# Patient Record
Sex: FEMALE | Race: BLACK OR AFRICAN AMERICAN | Employment: UNEMPLOYED | URBAN - METROPOLITAN AREA
[De-identification: names, ages, dates, MRNs, and addresses within clinical notes are randomized per-mention and may not be internally consistent; named-entity substitution may affect disease eponyms.]

---

## 2017-02-21 RX ORDER — CARVEDILOL 12.5 MG/1
TABLET ORAL
Qty: 60 TABLET | Refills: 5 | Status: SHIPPED | OUTPATIENT
Start: 2017-02-21 | End: 2017-08-28 | Stop reason: SDUPTHER

## 2017-02-21 RX ORDER — HYDROCHLOROTHIAZIDE 25 MG/1
TABLET ORAL
Qty: 30 TABLET | Refills: 5 | Status: SHIPPED | OUTPATIENT
Start: 2017-02-21 | End: 2017-12-22 | Stop reason: SDUPTHER

## 2017-02-21 RX ORDER — FUROSEMIDE 40 MG/1
TABLET ORAL
Qty: 30 TABLET | Refills: 5 | Status: SHIPPED | OUTPATIENT
Start: 2017-02-21 | End: 2017-08-21 | Stop reason: SDUPTHER

## 2017-03-28 RX ORDER — TRAMADOL HYDROCHLORIDE 50 MG/1
TABLET ORAL
Qty: 120 TABLET | Refills: 0 | Status: SHIPPED | OUTPATIENT
Start: 2017-03-28 | End: 2017-05-02 | Stop reason: SDUPTHER

## 2017-05-02 RX ORDER — TRAMADOL HYDROCHLORIDE 50 MG/1
TABLET ORAL
Qty: 120 TABLET | Refills: 0 | Status: SHIPPED | OUTPATIENT
Start: 2017-05-02 | End: 2017-06-02 | Stop reason: SDUPTHER

## 2017-05-02 RX ORDER — TRAMADOL HYDROCHLORIDE 50 MG/1
TABLET ORAL
Qty: 120 TABLET | Refills: 0 | Status: CANCELLED | OUTPATIENT
Start: 2017-05-02

## 2017-06-02 RX ORDER — TRAMADOL HYDROCHLORIDE 50 MG/1
TABLET ORAL
Qty: 120 TABLET | Refills: 1 | Status: SHIPPED | OUTPATIENT
Start: 2017-06-02 | End: 2017-07-26 | Stop reason: SDUPTHER

## 2017-06-16 ENCOUNTER — HOSPITAL ENCOUNTER (OUTPATIENT)
Age: 42
Discharge: HOME | End: 2017-06-16
Payer: MEDICARE

## 2017-06-16 ENCOUNTER — OFFICE VISIT (OUTPATIENT)
Dept: FAMILY MEDICINE CLINIC | Age: 42
End: 2017-06-16
Payer: MEDICARE

## 2017-06-16 ENCOUNTER — HOSPITAL ENCOUNTER (OUTPATIENT)
Age: 42
Setting detail: SPECIMEN
Discharge: HOME | End: 2017-06-16
Payer: MEDICARE

## 2017-06-16 ENCOUNTER — HOSPITAL ENCOUNTER (OUTPATIENT)
Dept: GENERAL RADIOLOGY | Age: 42
Discharge: HOME | End: 2017-06-16
Payer: MEDICARE

## 2017-06-16 VITALS
BODY MASS INDEX: 33.87 KG/M2 | HEART RATE: 71 BPM | SYSTOLIC BLOOD PRESSURE: 142 MMHG | OXYGEN SATURATION: 98 % | HEIGHT: 61 IN | DIASTOLIC BLOOD PRESSURE: 78 MMHG | WEIGHT: 179.4 LBS

## 2017-06-16 DIAGNOSIS — R53.83 OTHER FATIGUE: ICD-10-CM

## 2017-06-16 DIAGNOSIS — Z12.4 SCREENING FOR CERVICAL CANCER: ICD-10-CM

## 2017-06-16 DIAGNOSIS — M25.551 RIGHT HIP PAIN: Primary | ICD-10-CM

## 2017-06-16 DIAGNOSIS — Z13.220 SCREENING FOR LIPID DISORDERS: ICD-10-CM

## 2017-06-16 DIAGNOSIS — M25.551 PAIN OF RIGHT HIP JOINT: ICD-10-CM

## 2017-06-16 DIAGNOSIS — Z51.81 MEDICATION MONITORING ENCOUNTER: ICD-10-CM

## 2017-06-16 DIAGNOSIS — Z01.419 WELL WOMAN EXAM: Primary | ICD-10-CM

## 2017-06-16 DIAGNOSIS — Z12.31 ENCOUNTER FOR SCREENING MAMMOGRAM FOR MALIGNANT NEOPLASM OF BREAST: ICD-10-CM

## 2017-06-16 DIAGNOSIS — Z12.39 SCREENING FOR BREAST CANCER: ICD-10-CM

## 2017-06-16 DIAGNOSIS — M25.551 RIGHT HIP PAIN: ICD-10-CM

## 2017-06-16 LAB
ALBUMIN SERPL-MCNC: 4.1 G/DL (ref 3.5–5.2)
ALBUMIN/GLOBULIN RATIO: ABNORMAL (ref 1–2.5)
ALP BLD-CCNC: 41 U/L (ref 35–104)
ALT SERPL-CCNC: 18 U/L (ref 5–33)
ANION GAP SERPL CALCULATED.3IONS-SCNC: 11 MMOL/L (ref 9–17)
AST SERPL-CCNC: 17 U/L
BILIRUB SERPL-MCNC: 0.27 MG/DL (ref 0.3–1.2)
BUN BLDV-MCNC: 10 MG/DL (ref 6–20)
BUN/CREAT BLD: 14 (ref 9–20)
CALCIUM SERPL-MCNC: 9.5 MG/DL (ref 8.6–10.4)
CHLORIDE BLD-SCNC: 103 MMOL/L (ref 98–107)
CHOLESTEROL, FASTING: 142 MG/DL
CHOLESTEROL/HDL RATIO: 2.6
CO2: 28 MMOL/L (ref 20–31)
CREAT SERPL-MCNC: 0.72 MG/DL (ref 0.5–0.9)
GFR AFRICAN AMERICAN: >60 ML/MIN
GFR NON-AFRICAN AMERICAN: >60 ML/MIN
GFR SERPL CREATININE-BSD FRML MDRD: ABNORMAL ML/MIN/{1.73_M2}
GFR SERPL CREATININE-BSD FRML MDRD: ABNORMAL ML/MIN/{1.73_M2}
GLUCOSE FASTING: 83 MG/DL (ref 70–99)
HDLC SERPL-MCNC: 55 MG/DL
LDL CHOLESTEROL: 77 MG/DL (ref 0–130)
POTASSIUM SERPL-SCNC: 4.2 MMOL/L (ref 3.7–5.3)
SODIUM BLD-SCNC: 142 MMOL/L (ref 135–144)
TOTAL PROTEIN: 6.9 G/DL (ref 6.4–8.3)
TRIGLYCERIDE, FASTING: 50 MG/DL
TSH SERPL DL<=0.05 MIU/L-ACNC: 0.94 MIU/L (ref 0.3–5)
VLDLC SERPL CALC-MCNC: NORMAL MG/DL (ref 1–30)

## 2017-06-16 PROCEDURE — G0101 CA SCREEN;PELVIC/BREAST EXAM: HCPCS | Performed by: FAMILY MEDICINE

## 2017-06-16 PROCEDURE — 73502 X-RAY EXAM HIP UNI 2-3 VIEWS: CPT

## 2017-06-16 PROCEDURE — 80053 COMPREHEN METABOLIC PANEL: CPT

## 2017-06-16 PROCEDURE — 84443 ASSAY THYROID STIM HORMONE: CPT

## 2017-06-16 PROCEDURE — 80061 LIPID PANEL: CPT

## 2017-06-16 PROCEDURE — 36415 COLL VENOUS BLD VENIPUNCTURE: CPT

## 2017-06-16 RX ORDER — CYCLOBENZAPRINE HCL 10 MG
10 TABLET ORAL EVERY EVENING
Qty: 10 TABLET | Refills: 0 | Status: SHIPPED | OUTPATIENT
Start: 2017-06-16 | End: 2017-06-26

## 2017-06-16 RX ORDER — PREDNISONE 50 MG/1
50 TABLET ORAL EVERY MORNING
Qty: 7 TABLET | Refills: 0 | Status: SHIPPED | OUTPATIENT
Start: 2017-06-16 | End: 2017-06-23

## 2017-06-16 ASSESSMENT — PATIENT HEALTH QUESTIONNAIRE - PHQ9
SUM OF ALL RESPONSES TO PHQ9 QUESTIONS 1 & 2: 0
2. FEELING DOWN, DEPRESSED OR HOPELESS: 0
SUM OF ALL RESPONSES TO PHQ QUESTIONS 1-9: 0
1. LITTLE INTEREST OR PLEASURE IN DOING THINGS: 0

## 2017-06-16 ASSESSMENT — ENCOUNTER SYMPTOMS
ABDOMINAL DISTENTION: 0
BLOOD IN STOOL: 0
EYE ITCHING: 0
ABDOMINAL PAIN: 0
SHORTNESS OF BREATH: 0
EYE DISCHARGE: 0
RHINORRHEA: 0
NAUSEA: 0
FACIAL SWELLING: 0
CHEST TIGHTNESS: 0
WHEEZING: 0
CONSTIPATION: 0
VOICE CHANGE: 0
SINUS PRESSURE: 0
PHOTOPHOBIA: 0
COLOR CHANGE: 0
DIARRHEA: 0
VOMITING: 0
BACK PAIN: 0
COUGH: 0
SORE THROAT: 0
EYE PAIN: 0
EYE REDNESS: 0

## 2017-06-19 RX ORDER — HYDROCODONE BITARTRATE AND ACETAMINOPHEN 10; 325 MG/1; MG/1
.5-1 TABLET ORAL EVERY 4 HOURS PRN
Qty: 40 TABLET | Refills: 0 | Status: SHIPPED | OUTPATIENT
Start: 2017-06-19 | End: 2017-07-21 | Stop reason: SDUPTHER

## 2017-06-21 ENCOUNTER — TELEPHONE (OUTPATIENT)
Dept: FAMILY MEDICINE CLINIC | Age: 42
End: 2017-06-21

## 2017-06-23 LAB — CYTOLOGY REPORT: NORMAL

## 2017-06-26 RX ORDER — LISINOPRIL 40 MG/1
TABLET ORAL
Qty: 90 TABLET | Refills: 1 | Status: SHIPPED | OUTPATIENT
Start: 2017-06-26 | End: 2017-12-21 | Stop reason: SDUPTHER

## 2017-07-06 ENCOUNTER — OFFICE VISIT (OUTPATIENT)
Dept: FAMILY MEDICINE CLINIC | Age: 42
End: 2017-07-06
Payer: MEDICARE

## 2017-07-06 VITALS
BODY MASS INDEX: 33.57 KG/M2 | HEART RATE: 78 BPM | SYSTOLIC BLOOD PRESSURE: 114 MMHG | OXYGEN SATURATION: 98 % | WEIGHT: 177.8 LBS | HEIGHT: 61 IN | DIASTOLIC BLOOD PRESSURE: 76 MMHG

## 2017-07-06 DIAGNOSIS — Z11.3 SCREEN FOR STD (SEXUALLY TRANSMITTED DISEASE): ICD-10-CM

## 2017-07-06 DIAGNOSIS — R10.2 PELVIC PAIN IN FEMALE: Primary | ICD-10-CM

## 2017-07-06 PROCEDURE — 1036F TOBACCO NON-USER: CPT | Performed by: FAMILY MEDICINE

## 2017-07-06 PROCEDURE — G8427 DOCREV CUR MEDS BY ELIG CLIN: HCPCS | Performed by: FAMILY MEDICINE

## 2017-07-06 PROCEDURE — 99213 OFFICE O/P EST LOW 20 MIN: CPT | Performed by: FAMILY MEDICINE

## 2017-07-06 PROCEDURE — G8417 CALC BMI ABV UP PARAM F/U: HCPCS | Performed by: FAMILY MEDICINE

## 2017-07-06 ASSESSMENT — ENCOUNTER SYMPTOMS
CHEST TIGHTNESS: 0
BACK PAIN: 0
EYE DISCHARGE: 0
EYE PAIN: 0
FACIAL SWELLING: 0
BLOOD IN STOOL: 0
PHOTOPHOBIA: 0
EYE REDNESS: 0
ABDOMINAL PAIN: 0
COLOR CHANGE: 0
WHEEZING: 0
CONSTIPATION: 0
VOICE CHANGE: 0
DIARRHEA: 0
SORE THROAT: 0
SHORTNESS OF BREATH: 0
SINUS PRESSURE: 0
EYE ITCHING: 0
NAUSEA: 0
VOMITING: 0
ABDOMINAL DISTENTION: 0
RHINORRHEA: 0
COUGH: 0

## 2017-07-21 ENCOUNTER — TELEPHONE (OUTPATIENT)
Dept: FAMILY MEDICINE CLINIC | Age: 42
End: 2017-07-21

## 2017-07-21 ENCOUNTER — HOSPITAL ENCOUNTER (OUTPATIENT)
Dept: ULTRASOUND IMAGING | Age: 42
Discharge: HOME | End: 2017-07-21
Payer: MEDICARE

## 2017-07-21 DIAGNOSIS — R10.2 PELVIC PAIN IN FEMALE: ICD-10-CM

## 2017-07-21 PROCEDURE — 76856 US EXAM PELVIC COMPLETE: CPT

## 2017-07-21 RX ORDER — HYDROCODONE BITARTRATE AND ACETAMINOPHEN 10; 325 MG/1; MG/1
.5-1 TABLET ORAL EVERY 4 HOURS PRN
Qty: 40 TABLET | Refills: 0 | Status: SHIPPED | OUTPATIENT
Start: 2017-07-21 | End: 2017-08-31 | Stop reason: SDUPTHER

## 2017-07-26 RX ORDER — TRAMADOL HYDROCHLORIDE 50 MG/1
TABLET ORAL
Qty: 120 TABLET | Refills: 1 | Status: SHIPPED | OUTPATIENT
Start: 2017-07-26 | End: 2017-09-25 | Stop reason: SDUPTHER

## 2017-07-28 ENCOUNTER — HOSPITAL ENCOUNTER (OUTPATIENT)
Dept: MAMMOGRAPHY | Age: 42
Discharge: HOME | End: 2017-07-28
Payer: MEDICARE

## 2017-07-28 DIAGNOSIS — Z12.39 SCREENING FOR BREAST CANCER: ICD-10-CM

## 2017-07-28 DIAGNOSIS — Z12.31 ENCOUNTER FOR SCREENING MAMMOGRAM FOR MALIGNANT NEOPLASM OF BREAST: ICD-10-CM

## 2017-07-28 DIAGNOSIS — Z01.419 WELL WOMAN EXAM: ICD-10-CM

## 2017-07-28 PROCEDURE — 77063 BREAST TOMOSYNTHESIS BI: CPT

## 2017-08-22 RX ORDER — FUROSEMIDE 40 MG/1
TABLET ORAL
Qty: 30 TABLET | Refills: 3 | Status: SHIPPED | OUTPATIENT
Start: 2017-08-22 | End: 2017-12-22 | Stop reason: SDUPTHER

## 2017-08-28 RX ORDER — CARVEDILOL 12.5 MG/1
TABLET ORAL
Qty: 60 TABLET | Refills: 5 | Status: SHIPPED | OUTPATIENT
Start: 2017-08-28 | End: 2019-04-18 | Stop reason: SDUPTHER

## 2017-08-31 RX ORDER — HYDROCODONE BITARTRATE AND ACETAMINOPHEN 10; 325 MG/1; MG/1
.5-1 TABLET ORAL EVERY 4 HOURS PRN
Qty: 40 TABLET | Refills: 0 | Status: SHIPPED | OUTPATIENT
Start: 2017-08-31 | End: 2017-09-28 | Stop reason: SDUPTHER

## 2017-09-25 RX ORDER — TRAMADOL HYDROCHLORIDE 50 MG/1
TABLET ORAL
Qty: 120 TABLET | Refills: 1 | Status: SHIPPED | OUTPATIENT
Start: 2017-09-25 | End: 2017-11-17 | Stop reason: SDUPTHER

## 2017-09-29 RX ORDER — HYDROCODONE BITARTRATE AND ACETAMINOPHEN 10; 325 MG/1; MG/1
.5-1 TABLET ORAL EVERY 4 HOURS PRN
Qty: 40 TABLET | Refills: 0 | Status: SHIPPED | OUTPATIENT
Start: 2017-09-29 | End: 2017-10-06

## 2017-10-26 RX ORDER — HYDROCODONE BITARTRATE AND ACETAMINOPHEN 10; 325 MG/1; MG/1
.5-1 TABLET ORAL EVERY 4 HOURS PRN
Qty: 40 TABLET | Refills: 0 | Status: SHIPPED | OUTPATIENT
Start: 2017-10-26 | End: 2017-11-02

## 2017-11-17 RX ORDER — TRAMADOL HYDROCHLORIDE 50 MG/1
TABLET ORAL
Qty: 120 TABLET | Refills: 1 | Status: SHIPPED | OUTPATIENT
Start: 2017-11-17 | End: 2018-01-06 | Stop reason: SDUPTHER

## 2017-11-27 RX ORDER — HYDROCODONE BITARTRATE AND ACETAMINOPHEN 10; 325 MG/1; MG/1
.5-1 TABLET ORAL EVERY 4 HOURS PRN
Qty: 40 TABLET | Refills: 0 | Status: SHIPPED | OUTPATIENT
Start: 2017-11-27 | End: 2017-12-21 | Stop reason: SDUPTHER

## 2017-12-08 DIAGNOSIS — L84 CALLUS OF FOOT: Primary | ICD-10-CM

## 2017-12-21 ENCOUNTER — PATIENT MESSAGE (OUTPATIENT)
Dept: FAMILY MEDICINE CLINIC | Age: 42
End: 2017-12-21

## 2017-12-21 RX ORDER — HYDROCODONE BITARTRATE AND ACETAMINOPHEN 10; 325 MG/1; MG/1
.5-1 TABLET ORAL EVERY 4 HOURS PRN
Qty: 40 TABLET | Refills: 0 | Status: SHIPPED | OUTPATIENT
Start: 2017-12-21 | End: 2018-01-23 | Stop reason: SDUPTHER

## 2017-12-21 RX ORDER — LISINOPRIL 40 MG/1
TABLET ORAL
Qty: 90 TABLET | Refills: 3 | Status: SHIPPED | OUTPATIENT
Start: 2017-12-21 | End: 2019-02-25 | Stop reason: SDUPTHER

## 2017-12-21 NOTE — TELEPHONE ENCOUNTER
Next Visit Date:  No future appointments.     Health Maintenance   Topic Date Due    HIV screen  05/21/1988    DTaP/Tdap/Td vaccine (1 - Tdap) 05/21/1992    Flu vaccine (1) 09/01/2017    Cervical cancer screen  06/16/2018    Lipid screen  06/16/2022       No results found for: LABA1C          ( goal A1C is < 7)   No results found for: LABMICR  LDL Cholesterol (mg/dL)   Date Value   06/16/2017 77       (goal LDL is <100)   AST (U/L)   Date Value   06/16/2017 17     ALT (U/L)   Date Value   06/16/2017 18     BUN (mg/dL)   Date Value   06/16/2017 10     BP Readings from Last 3 Encounters:   07/06/17 114/76   06/16/17 (!) 142/78   10/24/16 122/90          (goal 120/80)    All Future Testing planned in [facility]  Lab Frequency Next Occurrence   T. pallidum Ab Once 01/02/2018   HIV Screen Once 07/06/2017   Hepatitis C Antibody Once 07/06/2017   Hepatitis B Surface Antigen Once 01/02/2018   Chlamydia/GC DNA, Urine Once 07/06/2017   HSV Non-Specific Antibody, IgM Once 07/06/2017               Patient Active Problem List:     Seasonal allergies     HTN (hypertension)     Iron (Fe) deficiency anemia     Obesity     GERD (gastroesophageal reflux disease)     Bell's palsy     Post-nasal drip     Nasal obstruction     Nasal congestion     Raynaud's phenomenon without gangrene

## 2017-12-22 RX ORDER — FUROSEMIDE 40 MG/1
TABLET ORAL
Qty: 30 TABLET | Refills: 11 | Status: SHIPPED | OUTPATIENT
Start: 2017-12-22 | End: 2019-02-15 | Stop reason: SDUPTHER

## 2017-12-22 RX ORDER — HYDROCHLOROTHIAZIDE 25 MG/1
25 TABLET ORAL DAILY
Qty: 90 TABLET | Refills: 3 | Status: SHIPPED | OUTPATIENT
Start: 2017-12-22 | End: 2018-06-15

## 2017-12-22 NOTE — TELEPHONE ENCOUNTER
From: Africa Dry  Sent: 12/21/2017 3:07 PM EST  Subject: Medication Renewal Request    Lara Torres would like a refill of the following medications:  hydrochlorothiazide (HYDRODIURIL) 25 MG tablet Albert Malcolm MD]    Preferred pharmacy: [facility] [address] [place] [address], [address] [place] [address] [phone] - F [phone]    Comment:

## 2018-01-06 DIAGNOSIS — M25.559 ARTHRALGIA OF HIP, UNSPECIFIED LATERALITY: Primary | ICD-10-CM

## 2018-01-08 RX ORDER — TRAMADOL HYDROCHLORIDE 50 MG/1
TABLET ORAL
Qty: 120 TABLET | Refills: 1 | Status: SHIPPED | OUTPATIENT
Start: 2018-01-08 | End: 2018-02-27 | Stop reason: SDUPTHER

## 2018-01-10 ENCOUNTER — OFFICE VISIT (OUTPATIENT)
Dept: PODIATRY | Age: 43
End: 2018-01-10
Payer: MEDICARE

## 2018-01-10 VITALS
HEIGHT: 61 IN | BODY MASS INDEX: 31.15 KG/M2 | SYSTOLIC BLOOD PRESSURE: 157 MMHG | HEART RATE: 76 BPM | WEIGHT: 165 LBS | DIASTOLIC BLOOD PRESSURE: 103 MMHG

## 2018-01-10 DIAGNOSIS — M76.62 TENDONITIS, ACHILLES, LEFT: Primary | ICD-10-CM

## 2018-01-10 DIAGNOSIS — D23.72 BENIGN NEOPLASM OF SKIN OF FOOT, LEFT: ICD-10-CM

## 2018-01-10 DIAGNOSIS — R26.2 WALKING DIFFICULTY DUE TO ANKLE AND FOOT: ICD-10-CM

## 2018-01-10 DIAGNOSIS — D23.71 BENIGN NEOPLASM OF SKIN OF RIGHT FOOT: ICD-10-CM

## 2018-01-10 DIAGNOSIS — M25.572 PAIN IN JOINT INVOLVING ANKLE AND FOOT, LEFT: ICD-10-CM

## 2018-01-10 PROCEDURE — 17110 DESTRUCTION B9 LES UP TO 14: CPT | Performed by: PODIATRIST

## 2018-01-10 PROCEDURE — G8427 DOCREV CUR MEDS BY ELIG CLIN: HCPCS | Performed by: PODIATRIST

## 2018-01-10 PROCEDURE — 99203 OFFICE O/P NEW LOW 30 MIN: CPT | Performed by: PODIATRIST

## 2018-01-10 PROCEDURE — 1036F TOBACCO NON-USER: CPT | Performed by: PODIATRIST

## 2018-01-10 PROCEDURE — G8417 CALC BMI ABV UP PARAM F/U: HCPCS | Performed by: PODIATRIST

## 2018-01-10 PROCEDURE — G8484 FLU IMMUNIZE NO ADMIN: HCPCS | Performed by: PODIATRIST

## 2018-01-10 RX ORDER — MIRTAZAPINE 30 MG/1
TABLET, FILM COATED ORAL
Refills: 0 | COMMUNITY
Start: 2017-12-26 | End: 2018-08-15 | Stop reason: ALTCHOICE

## 2018-01-10 RX ORDER — NAPROXEN 500 MG/1
500 TABLET ORAL 2 TIMES DAILY WITH MEALS
Qty: 60 TABLET | Refills: 3 | Status: SHIPPED | OUTPATIENT
Start: 2018-01-10 | End: 2018-04-23

## 2018-01-23 DIAGNOSIS — M25.551 PAIN OF RIGHT HIP JOINT: Primary | ICD-10-CM

## 2018-01-23 RX ORDER — HYDROCODONE BITARTRATE AND ACETAMINOPHEN 10; 325 MG/1; MG/1
.5-1 TABLET ORAL EVERY 4 HOURS PRN
Qty: 40 TABLET | Refills: 0 | Status: SHIPPED | OUTPATIENT
Start: 2018-01-23 | End: 2018-02-15 | Stop reason: SDUPTHER

## 2018-01-23 RX ORDER — FLUTICASONE PROPIONATE 50 MCG
1-2 SPRAY, SUSPENSION (ML) NASAL 2 TIMES DAILY
Qty: 1 BOTTLE | Refills: 5 | Status: SHIPPED | OUTPATIENT
Start: 2018-01-23 | End: 2018-04-23 | Stop reason: ALTCHOICE

## 2018-01-23 RX ORDER — MOMETASONE FUROATE 50 UG/1
SPRAY, METERED NASAL
Qty: 1 INHALER | Refills: 5 | Status: SHIPPED | OUTPATIENT
Start: 2018-01-23 | End: 2018-06-19 | Stop reason: SDUPTHER

## 2018-01-23 NOTE — TELEPHONE ENCOUNTER
From: Johann Otoole  Sent: 1/23/2018 12:41 PM EST  Subject: Medication Renewal Request    Lara Cotto would like a refill of the following medications:  fluticasone (FLONASE) 50 MCG/ACT nasal spray Jennifer Paris MD]    Preferred pharmacy: [address] [place] [address], [address] [facility] [phone] - F [phone]    Comment:

## 2018-02-09 NOTE — PROGRESS NOTES
[facility] Podiatry  New Patient History and Physical    Chief Complaint   Patient presents with    New Patient    Ankle Pain     LEFT ANKLE    Blister     Plantar Warts- Bilateral Feet         HPI: Malik Booker is a 38 y.o. female who presents to the office today complaining of left ankle and foot pain. Symptoms began 3 month(s) ago. Patient has noticed pain is getting worse with walking and having trouble finding shoes that fit Patient relates pain is Present. Pain is rated 6 out of 10 and is described as constant. Treatments prior to today's visit include: none. She also complains of painful lesions to sharon feet. Currently denies F/C/N/V. Allergies   Allergen Reactions    Pcn [Penicillins]      c/o yeast infection        Past Medical History:   Diagnosis Date    Bell's palsy     GERD (gastroesophageal reflux disease)     HTN (hypertension)     Iron (Fe) deficiency anemia     Obesity     Post-nasal drip     Seasonal allergies        Prior to Admission medications    Medication Sig Start Date End Date Taking?  Authorizing Provider   mirtazapine (REMERON) 30 MG tablet take 1 tablet by mouth once daily BEFORE BEDTIME 12/26/17  Yes Historical Provider, MD   naproxen (NAPROSYN) 500 MG tablet Take 1 tablet by mouth 2 times daily (with meals) 1/10/18  Yes Sofya Bertrand DPM   hydrochlorothiazide (HYDRODIURIL) 25 MG tablet Take 1 tablet by mouth daily 12/22/17  Yes Breanna Otero MD   furosemide (LASIX) 40 MG tablet take 1 tablet by mouth once daily 12/22/17  Yes Breanna Otero MD   lisinopril (PRINIVIL;ZESTRIL) 40 MG tablet take 1 tablet by mouth once daily 12/21/17  Yes Breanna Otero MD   carvedilol (COREG) 12.5 MG tablet take 1 tablet by mouth twice a day 8/28/17  Yes Breanna Otero MD   clindamycin (CLEOCIN) 150 MG capsule take 1 capsule by mouth three times a day 10/13/16  Yes Historical Provider, MD   nisoldipine (SULAR) 20 MG SR tablet Take 1 tablet by mouth daily 2/25/16 joint, left. Medial longitudinal arch, Bilateral WNL. Ankle ROM pain to left. Dorsally contracted digits absent digits 1-5 Bilateral. Pain with AJ dorsiflexion, left. Vascular: DP and PT pulses palpable 2/4, Bilateral.  CFT <3 seconds, Bilateral.  Hair growth present to the level of the digits, Bilateral.  Edema to lateral left ankle. Varicosities absent, Bilateral. Erythema absent, Bilateral    Neurological: Sensation intact to light touch to level of digits, Bilateral.  Protective sensation intact 10/10 sites via 5.07/10g [place] Monofilament, Bilateral.  negative Tinel's, Bilateral.  negative Valleix sign, Bilateral.      Integument: Warm, dry, supple, Bilateral.  Benign soft tissue neoplasm with petechiae sub 5th mpj, sharon. Open lesion absent, Bilateral.  Interdigital maceration absent to web spaces 1-4, Bilateral.  Nails are normal in length, thickness and color 1-5 bilateral.  Fissures absent, Bilateral.       Radiographs:  3 views left ankle:  No evidence of fracture. Joint is congruous. Good anatomical alignment. Asessment: Patient is a 38 y.o. female with:   1. Tendonitis, Achilles, left  XR ANKLE LEFT (MIN 3 VIEWS)    Brace walker   2. Pain in joint involving ankle and foot, left  XR ANKLE LEFT (MIN 3 VIEWS)    Brace walker    19748 - ME DESTRUCTION BENIGN LESIONS UP TO 14   3. Walking difficulty due to ankle and foot  XR ANKLE LEFT (MIN 3 VIEWS)    Brace walker   4. Benign neoplasm of skin of foot, left  57708 - ME DESTRUCTION BENIGN LESIONS UP TO 14   5. Benign neoplasm of skin of right foot  52087 - ME DESTRUCTION BENIGN LESIONS UP TO 14   6. First degree ankle sprain, left, initial encounter  Brace walker         Plan: Patient examined and evaluated. Current condition and treatment options discussed in detail. Discussed conservative and surgical options with the patient. Advised pt to ice and elevate. Dispensed CAM walker today. Pt to wear when weightbearing.   May take NSAID

## 2018-02-15 DIAGNOSIS — M25.551 PAIN OF RIGHT HIP JOINT: ICD-10-CM

## 2018-02-15 RX ORDER — HYDROCODONE BITARTRATE AND ACETAMINOPHEN 10; 325 MG/1; MG/1
.5-1 TABLET ORAL EVERY 4 HOURS PRN
Qty: 40 TABLET | Refills: 0 | Status: SHIPPED | OUTPATIENT
Start: 2018-02-15 | End: 2018-03-14 | Stop reason: SDUPTHER

## 2018-02-26 RX ORDER — CLOTRIMAZOLE AND BETAMETHASONE DIPROPIONATE 10; .5 MG/ML; MG/ML
LOTION TOPICAL
Qty: 1 BOTTLE | Refills: 3 | Status: SHIPPED | OUTPATIENT
Start: 2018-02-26 | End: 2018-03-14 | Stop reason: SDUPTHER

## 2018-02-27 DIAGNOSIS — M25.559 ARTHRALGIA OF HIP, UNSPECIFIED LATERALITY: ICD-10-CM

## 2018-02-27 RX ORDER — TRAMADOL HYDROCHLORIDE 50 MG/1
TABLET ORAL
Qty: 120 TABLET | Refills: 1 | Status: SHIPPED | OUTPATIENT
Start: 2018-02-27 | End: 2018-04-23 | Stop reason: SDUPTHER

## 2018-02-27 NOTE — TELEPHONE ENCOUNTER
Next Visit Date:  No future appointments.     Health Maintenance   Topic Date Due    HIV screen  05/21/1988    DTaP/Tdap/Td vaccine (1 - Tdap) 05/21/1992    Flu vaccine (1) 09/01/2017    Cervical cancer screen  06/16/2018    Potassium monitoring  06/16/2018    Creatinine monitoring  06/16/2018    Lipid screen  06/16/2022       No results found for: LABA1C          ( goal A1C is < 7)   No results found for: LABMICR  LDL Cholesterol (mg/dL)   Date Value   06/16/2017 77       (goal LDL is <100)   AST (U/L)   Date Value   06/16/2017 17     ALT (U/L)   Date Value   06/16/2017 18     BUN (mg/dL)   Date Value   06/16/2017 10     BP Readings from Last 3 Encounters:   01/10/18 (!) 157/103   07/06/17 114/76   06/16/17 (!) 142/78          (goal 120/80)    All Future Testing planned in [facility]  Lab Frequency Next Occurrence   T. pallidum Ab Once 01/02/2018   HIV Screen Once 07/06/2017   Hepatitis C Antibody Once 07/06/2017   Hepatitis B Surface Antigen Once 01/02/2018   Chlamydia/GC DNA, Urine Once 07/06/2017   HSV Non-Specific Antibody, IgM Once 07/06/2017               Patient Active Problem List:     Seasonal allergies     HTN (hypertension)     Iron (Fe) deficiency anemia     Obesity     GERD (gastroesophageal reflux disease)     Bell's palsy     Post-nasal drip     Nasal obstruction     Nasal congestion     Raynaud's phenomenon without gangrene

## 2018-03-14 DIAGNOSIS — M25.559 ARTHRALGIA OF HIP, UNSPECIFIED LATERALITY: ICD-10-CM

## 2018-03-14 DIAGNOSIS — M25.551 PAIN OF RIGHT HIP JOINT: ICD-10-CM

## 2018-03-14 RX ORDER — CLOTRIMAZOLE AND BETAMETHASONE DIPROPIONATE 10; .5 MG/ML; MG/ML
LOTION TOPICAL
Qty: 1 BOTTLE | Refills: 3 | Status: SHIPPED | OUTPATIENT
Start: 2018-03-14 | End: 2018-04-23 | Stop reason: ALTCHOICE

## 2018-03-14 RX ORDER — HYDROCODONE BITARTRATE AND ACETAMINOPHEN 10; 325 MG/1; MG/1
.5-1 TABLET ORAL EVERY 4 HOURS PRN
Qty: 40 TABLET | Refills: 0 | Status: SHIPPED | OUTPATIENT
Start: 2018-03-14 | End: 2018-04-18 | Stop reason: SDUPTHER

## 2018-03-14 NOTE — TELEPHONE ENCOUNTER
MONCHO 02-27-18    Next Visit Date:  No future appointments.     Health Maintenance   Topic Date Due    HIV screen  05/21/1988    DTaP/Tdap/Td vaccine (1 - Tdap) 05/21/1992    Flu vaccine (1) 09/01/2017    Cervical cancer screen  06/16/2018    Potassium monitoring  06/16/2018    Creatinine monitoring  06/16/2018    Lipid screen  06/16/2022       No results found for: LABA1C          ( goal A1C is < 7)   No results found for: LABMICR  LDL Cholesterol (mg/dL)   Date Value   06/16/2017 77       (goal LDL is <100)   AST (U/L)   Date Value   06/16/2017 17     ALT (U/L)   Date Value   06/16/2017 18     BUN (mg/dL)   Date Value   06/16/2017 10     BP Readings from Last 3 Encounters:   01/10/18 (!) 157/103   07/06/17 114/76   06/16/17 (!) 142/78          (goal 120/80)    All Future Testing planned in [facility]  Lab Frequency Next Occurrence   T. pallidum Ab Once 01/02/2018   HIV Screen Once 07/06/2017   Hepatitis C Antibody Once 07/06/2017   Hepatitis B Surface Antigen Once 01/02/2018   Chlamydia/GC DNA, Urine Once 07/06/2017   HSV Non-Specific Antibody, IgM Once 07/06/2017               Patient Active Problem List:     Seasonal allergies     HTN (hypertension)     Iron (Fe) deficiency anemia     Obesity     GERD (gastroesophageal reflux disease)     Bell's palsy     Post-nasal drip     Nasal obstruction     Nasal congestion     Raynaud's phenomenon without gangrene

## 2018-04-03 PROBLEM — Z98.891 HISTORY OF LOW TRANSVERSE CESAREAN SECTION: Status: ACTIVE | Noted: 2018-04-03

## 2018-04-04 ENCOUNTER — OFFICE VISIT (OUTPATIENT)
Dept: OBGYN CLINIC | Age: 43
End: 2018-04-04
Payer: MEDICARE

## 2018-04-04 ENCOUNTER — HOSPITAL ENCOUNTER (OUTPATIENT)
Age: 43
Setting detail: SPECIMEN
Discharge: HOME | End: 2018-04-04
Payer: MEDICARE

## 2018-04-04 VITALS — BODY MASS INDEX: 31.58 KG/M2 | WEIGHT: 164.4 LBS

## 2018-04-04 DIAGNOSIS — N92.6 IRREGULAR MENSES: Primary | ICD-10-CM

## 2018-04-04 DIAGNOSIS — N93.9 ABNORMAL UTERINE BLEEDING (AUB): ICD-10-CM

## 2018-04-04 PROCEDURE — G8427 DOCREV CUR MEDS BY ELIG CLIN: HCPCS | Performed by: OBSTETRICS & GYNECOLOGY

## 2018-04-04 PROCEDURE — G8417 CALC BMI ABV UP PARAM F/U: HCPCS | Performed by: OBSTETRICS & GYNECOLOGY

## 2018-04-04 PROCEDURE — 99201 PR OFFICE OUTPATIENT NEW 10 MINUTES: CPT | Performed by: OBSTETRICS & GYNECOLOGY

## 2018-04-04 PROCEDURE — 1036F TOBACCO NON-USER: CPT | Performed by: OBSTETRICS & GYNECOLOGY

## 2018-04-05 LAB
C. TRACHOMATIS DNA ,URINE: NEGATIVE
N. GONORRHOEAE DNA, URINE: NEGATIVE

## 2018-04-18 DIAGNOSIS — M25.551 PAIN OF RIGHT HIP JOINT: ICD-10-CM

## 2018-04-18 RX ORDER — HYDROCODONE BITARTRATE AND ACETAMINOPHEN 10; 325 MG/1; MG/1
.5-1 TABLET ORAL EVERY 4 HOURS PRN
Qty: 40 TABLET | Refills: 0 | Status: SHIPPED | OUTPATIENT
Start: 2018-04-18 | End: 2018-05-17 | Stop reason: SDUPTHER

## 2018-04-23 ENCOUNTER — OFFICE VISIT (OUTPATIENT)
Dept: FAMILY MEDICINE CLINIC | Age: 43
End: 2018-04-23
Payer: MEDICARE

## 2018-04-23 VITALS
HEART RATE: 74 BPM | WEIGHT: 178.1 LBS | DIASTOLIC BLOOD PRESSURE: 90 MMHG | OXYGEN SATURATION: 98 % | BODY MASS INDEX: 33.62 KG/M2 | HEIGHT: 61 IN | SYSTOLIC BLOOD PRESSURE: 146 MMHG

## 2018-04-23 DIAGNOSIS — Z13.6 SCREENING FOR CARDIOVASCULAR CONDITION: ICD-10-CM

## 2018-04-23 DIAGNOSIS — M25.559 ARTHRALGIA OF HIP, UNSPECIFIED LATERALITY: ICD-10-CM

## 2018-04-23 DIAGNOSIS — Z00.00 MEDICARE ANNUAL WELLNESS VISIT, SUBSEQUENT: Primary | ICD-10-CM

## 2018-04-23 DIAGNOSIS — Z00.00 ROUTINE GENERAL MEDICAL EXAMINATION AT A HEALTH CARE FACILITY: ICD-10-CM

## 2018-04-23 PROCEDURE — G0438 PPPS, INITIAL VISIT: HCPCS | Performed by: FAMILY MEDICINE

## 2018-04-23 PROCEDURE — 93000 ELECTROCARDIOGRAM COMPLETE: CPT | Performed by: FAMILY MEDICINE

## 2018-04-23 RX ORDER — CELECOXIB 200 MG/1
200 CAPSULE ORAL DAILY
Qty: 90 CAPSULE | Refills: 3 | Status: SHIPPED | OUTPATIENT
Start: 2018-04-23 | End: 2018-08-15 | Stop reason: SINTOL

## 2018-04-23 RX ORDER — TRAMADOL HYDROCHLORIDE 50 MG/1
TABLET ORAL
Qty: 120 TABLET | Refills: 1 | Status: SHIPPED | OUTPATIENT
Start: 2018-04-23 | End: 2018-05-31 | Stop reason: SDUPTHER

## 2018-04-23 ASSESSMENT — ENCOUNTER SYMPTOMS
COLOR CHANGE: 0
SINUS PRESSURE: 0
COUGH: 0
EYE PAIN: 0
ABDOMINAL DISTENTION: 0
CONSTIPATION: 0
NAUSEA: 0
WHEEZING: 0
EYE DISCHARGE: 0
DIARRHEA: 0
ABDOMINAL PAIN: 0
EYE REDNESS: 0
FACIAL SWELLING: 0
SHORTNESS OF BREATH: 0
BACK PAIN: 0
VOICE CHANGE: 0
RHINORRHEA: 0
SORE THROAT: 0
VOMITING: 0
CHEST TIGHTNESS: 0
EYE ITCHING: 0
PHOTOPHOBIA: 0
BLOOD IN STOOL: 0

## 2018-04-23 ASSESSMENT — LIFESTYLE VARIABLES: HOW OFTEN DO YOU HAVE A DRINK CONTAINING ALCOHOL: 0

## 2018-04-23 ASSESSMENT — PATIENT HEALTH QUESTIONNAIRE - PHQ9: SUM OF ALL RESPONSES TO PHQ QUESTIONS 1-9: 2

## 2018-05-17 DIAGNOSIS — M25.551 PAIN OF RIGHT HIP JOINT: ICD-10-CM

## 2018-05-17 RX ORDER — HYDROCODONE BITARTRATE AND ACETAMINOPHEN 10; 325 MG/1; MG/1
.5-1 TABLET ORAL EVERY 4 HOURS PRN
Qty: 40 TABLET | Refills: 0 | Status: SHIPPED | OUTPATIENT
Start: 2018-05-17 | End: 2018-06-13 | Stop reason: SDUPTHER

## 2018-05-31 DIAGNOSIS — M25.559 ARTHRALGIA OF HIP, UNSPECIFIED LATERALITY: ICD-10-CM

## 2018-05-31 RX ORDER — TRAMADOL HYDROCHLORIDE 50 MG/1
TABLET ORAL
Qty: 180 TABLET | Refills: 1 | Status: SHIPPED | OUTPATIENT
Start: 2018-05-31 | End: 2018-07-24 | Stop reason: SDUPTHER

## 2018-06-13 DIAGNOSIS — M25.551 PAIN OF RIGHT HIP JOINT: ICD-10-CM

## 2018-06-13 RX ORDER — HYDROCODONE BITARTRATE AND ACETAMINOPHEN 10; 325 MG/1; MG/1
TABLET ORAL
Qty: 40 TABLET | Refills: 0 | Status: SHIPPED | OUTPATIENT
Start: 2018-06-14 | End: 2018-07-16 | Stop reason: SDUPTHER

## 2018-06-14 ENCOUNTER — OFFICE VISIT (OUTPATIENT)
Dept: OBGYN CLINIC | Age: 43
End: 2018-06-14
Payer: MEDICARE

## 2018-06-14 ENCOUNTER — TELEPHONE (OUTPATIENT)
Dept: FAMILY MEDICINE CLINIC | Age: 43
End: 2018-06-14

## 2018-06-14 VITALS
DIASTOLIC BLOOD PRESSURE: 99 MMHG | HEART RATE: 84 BPM | HEIGHT: 64 IN | BODY MASS INDEX: 29.02 KG/M2 | WEIGHT: 170 LBS | SYSTOLIC BLOOD PRESSURE: 158 MMHG

## 2018-06-14 DIAGNOSIS — N95.1 MENOPAUSAL SYMPTOMS: Primary | ICD-10-CM

## 2018-06-14 DIAGNOSIS — F41.9 ANXIETY: ICD-10-CM

## 2018-06-14 PROCEDURE — 99214 OFFICE O/P EST MOD 30 MIN: CPT | Performed by: OBSTETRICS & GYNECOLOGY

## 2018-06-14 PROCEDURE — G8417 CALC BMI ABV UP PARAM F/U: HCPCS | Performed by: OBSTETRICS & GYNECOLOGY

## 2018-06-14 PROCEDURE — G8427 DOCREV CUR MEDS BY ELIG CLIN: HCPCS | Performed by: OBSTETRICS & GYNECOLOGY

## 2018-06-14 PROCEDURE — 1036F TOBACCO NON-USER: CPT | Performed by: OBSTETRICS & GYNECOLOGY

## 2018-06-14 RX ORDER — HYDROXYZINE PAMOATE 25 MG/1
CAPSULE ORAL
Refills: 0 | COMMUNITY
Start: 2018-05-24 | End: 2021-06-08 | Stop reason: ALTCHOICE

## 2018-06-15 ENCOUNTER — NURSE ONLY (OUTPATIENT)
Dept: FAMILY MEDICINE CLINIC | Age: 43
End: 2018-06-15
Payer: MEDICARE

## 2018-06-15 VITALS
DIASTOLIC BLOOD PRESSURE: 88 MMHG | OXYGEN SATURATION: 99 % | SYSTOLIC BLOOD PRESSURE: 148 MMHG | HEART RATE: 75 BPM | BODY MASS INDEX: 29.52 KG/M2 | WEIGHT: 172 LBS

## 2018-06-15 DIAGNOSIS — I10 HYPERTENSION, UNSPECIFIED TYPE: Primary | ICD-10-CM

## 2018-06-15 PROCEDURE — 99211 OFF/OP EST MAY X REQ PHY/QHP: CPT | Performed by: FAMILY MEDICINE

## 2018-06-15 RX ORDER — TRIAMTERENE AND HYDROCHLOROTHIAZIDE 37.5; 25 MG/1; MG/1
1 TABLET ORAL DAILY
Qty: 90 TABLET | Refills: 3 | Status: SHIPPED | OUTPATIENT
Start: 2018-06-15 | End: 2019-07-15 | Stop reason: SDUPTHER

## 2018-06-19 RX ORDER — MOMETASONE FUROATE 50 UG/1
SPRAY, METERED NASAL
Qty: 1 INHALER | Refills: 5 | Status: SHIPPED | OUTPATIENT
Start: 2018-06-19 | End: 2019-02-15 | Stop reason: ALTCHOICE

## 2018-07-12 ENCOUNTER — INITIAL CONSULT (OUTPATIENT)
Dept: BEHAVIORAL/MENTAL HEALTH | Age: 43
End: 2018-07-12
Payer: MEDICARE

## 2018-07-12 DIAGNOSIS — F31.9 BIPOLAR 1 DISORDER, DEPRESSED (HCC): Primary | ICD-10-CM

## 2018-07-12 DIAGNOSIS — F43.10 POST TRAUMATIC STRESS DISORDER: ICD-10-CM

## 2018-07-12 PROCEDURE — 90791 PSYCH DIAGNOSTIC EVALUATION: CPT | Performed by: SOCIAL WORKER

## 2018-07-12 NOTE — PROGRESS NOTES
(gastroesophageal reflux disease)     HTN (hypertension)     Iron (Fe) deficiency anemia     Obesity     Post-nasal drip     Seasonal allergies        History:    Medications:   Current Outpatient Prescriptions   Medication Sig Dispense Refill    mometasone (NASONEX) 50 MCG/ACT nasal spray 2 sprays each nostril QD 1 Inhaler 5    triamterene-hydrochlorothiazide (MAXZIDE-25) 37.5-25 MG per tablet Take 1 tablet by mouth daily 90 tablet 3    hydrOXYzine (VISTARIL) 25 MG capsule take 1-2 capsules by mouth every evening  0    celecoxib (CELEBREX) 200 MG capsule Take 1 capsule by mouth daily 90 capsule 3    mirtazapine (REMERON) 30 MG tablet take 1 tablet by mouth once daily BEFORE BEDTIME  0    furosemide (LASIX) 40 MG tablet take 1 tablet by mouth once daily 30 tablet 11    lisinopril (PRINIVIL;ZESTRIL) 40 MG tablet take 1 tablet by mouth once daily 90 tablet 3    carvedilol (COREG) 12.5 MG tablet take 1 tablet by mouth twice a day 60 tablet 5    clindamycin (CLEOCIN) 150 MG capsule take 1 capsule by mouth three times a day  0    nisoldipine (SULAR) 20 MG SR tablet Take 1 tablet by mouth daily 30 tablet 3    lidocaine (XYLOCAINE) 5 % ointment Apply topically as needed. 1 Tube 1    hydrocortisone 2.5 % cream 2.5 Tubes  0    ABILIFY 20 MG tablet 20 mg daily. 0    busPIRone (BUSPAR) 15 MG tablet 15 mg daily. 0    benztropine (COGENTIN) 2 MG tablet 2 mg daily. 0    ferrous sulfate 325 (65 FE) MG tablet Take 325 mg by mouth 2 times daily. No current facility-administered medications for this visit. Social History:   Social History     Social History    Marital status:      Spouse name: N/A    Number of children: N/A    Years of education: N/A     Occupational History    Not on file.      Social History Main Topics    Smoking status: Former Smoker     Quit date: 1/2/1995    Smokeless tobacco: Never Used    Alcohol use Yes      Comment: occational wine    Drug use: No   

## 2018-07-16 DIAGNOSIS — M25.551 PAIN OF RIGHT HIP JOINT: ICD-10-CM

## 2018-07-16 RX ORDER — HYDROCODONE BITARTRATE AND ACETAMINOPHEN 10; 325 MG/1; MG/1
TABLET ORAL
Qty: 40 TABLET | Refills: 0 | Status: SHIPPED | OUTPATIENT
Start: 2018-07-16 | End: 2018-08-14 | Stop reason: SDUPTHER

## 2018-07-16 NOTE — TELEPHONE ENCOUNTER
LV - 18  OARRS - 18    Next Visit Date:  Future Appointments  Date Time Provider Elbert Ariza   2018 1:00 PM [facility] [address] Maintenance   Topic Date Due    HIV screen  1988    DTaP/Tdap/Td vaccine (1 - Tdap) 1992    Cervical cancer screen  2018    Potassium monitoring  2018    Creatinine monitoring  2018    Flu vaccine (1) 2018    Lipid screen  2022       No results found for: LABA1C          ( goal A1C is < 7)   No results found for: LABMICR  LDL Cholesterol (mg/dL)   Date Value   2017 77   2016 74       (goal LDL is <100)   AST (U/L)   Date Value   2017 17     ALT (U/L)   Date Value   2017 18     BUN (mg/dL)   Date Value   2017 10     BP Readings from Last 3 Encounters:   06/15/18 (!) 148/88   18 (!) 158/99   18 (!) 146/90          (goal 120/80)    All Future Testing planned in [facility]  Lab Frequency Next Occurrence   Chlamydia/GC DNA, Urine Once 2018   US Pelvis Complete Once 2019   US NON OB TRANSVAGINAL Once 2019               Patient Active Problem List:     Seasonal allergies     HTN (hypertension)     Iron (Fe) deficiency anemia     Obesity     GERD (gastroesophageal reflux disease)     Bell's palsy     Post-nasal drip     Nasal obstruction     Nasal congestion     Raynaud's phenomenon without gangrene     History of low transverse  section     Bipolar 1 disorder, depressed ([facility] [address]     Post traumatic stress disorder

## 2018-07-24 DIAGNOSIS — M25.559 ARTHRALGIA OF HIP, UNSPECIFIED LATERALITY: ICD-10-CM

## 2018-07-24 RX ORDER — TRAMADOL HYDROCHLORIDE 50 MG/1
TABLET ORAL
Qty: 180 TABLET | Refills: 0 | Status: SHIPPED | OUTPATIENT
Start: 2018-07-24 | End: 2018-08-27 | Stop reason: SDUPTHER

## 2018-08-14 DIAGNOSIS — M25.551 PAIN OF RIGHT HIP JOINT: ICD-10-CM

## 2018-08-14 RX ORDER — HYDROCODONE BITARTRATE AND ACETAMINOPHEN 10; 325 MG/1; MG/1
TABLET ORAL
Qty: 40 TABLET | Refills: 0 | Status: SHIPPED | OUTPATIENT
Start: 2018-08-14 | End: 2018-09-17 | Stop reason: SDUPTHER

## 2018-08-14 NOTE — TELEPHONE ENCOUNTER
oarrs 18      Last visit:  18  Last Med refill:  18  Next Visit Date:  Future Appointments  Date Time Provider Elbert Ariza   8/15/2018 1:40 PM Roldan Hargrove  [address] [facility] Maintenance   Topic Date Due    HIV screen  1988    DTaP/Tdap/Td vaccine (1 - Tdap) 1992    Cervical cancer screen  2018    Potassium monitoring  2018    Creatinine monitoring  2018    Flu vaccine (1) 2018    Lipid screen  2022       No results found for: LABA1C          ( goal A1C is < 7)   No results found for: LABMICR  LDL Cholesterol (mg/dL)   Date Value   2017 77   2016 74       (goal LDL is <100)   AST (U/L)   Date Value   2017 17     ALT (U/L)   Date Value   2017 18     BUN (mg/dL)   Date Value   2017 10     BP Readings from Last 3 Encounters:   06/15/18 (!) 148/88   18 (!) 158/99   18 (!) 146/90          (goal 120/80)    All Future Testing planned in [facility]  Lab Frequency Next Occurrence   Chlamydia/GC DNA, Urine Once 2018   US Pelvis Complete Once 2019   US NON OB TRANSVAGINAL Once 2019               Patient Active Problem List:     Seasonal allergies     HTN (hypertension)     Iron (Fe) deficiency anemia     Obesity     GERD (gastroesophageal reflux disease)     Bell's palsy     Post-nasal drip     Nasal obstruction     Nasal congestion     Raynaud's phenomenon without gangrene     History of low transverse  section     Bipolar 1 disorder, depressed ([facility] [address]     Post traumatic stress disorder

## 2018-08-15 ENCOUNTER — PATIENT MESSAGE (OUTPATIENT)
Dept: FAMILY MEDICINE CLINIC | Age: 43
End: 2018-08-15

## 2018-08-15 ENCOUNTER — OFFICE VISIT (OUTPATIENT)
Dept: FAMILY MEDICINE CLINIC | Age: 43
End: 2018-08-15
Payer: MEDICARE

## 2018-08-15 VITALS
HEART RATE: 60 BPM | BODY MASS INDEX: 28.01 KG/M2 | SYSTOLIC BLOOD PRESSURE: 116 MMHG | DIASTOLIC BLOOD PRESSURE: 72 MMHG | WEIGHT: 163.2 LBS | OXYGEN SATURATION: 98 %

## 2018-08-15 DIAGNOSIS — M25.552 PAIN OF BOTH HIP JOINTS: ICD-10-CM

## 2018-08-15 DIAGNOSIS — M25.551 PAIN OF BOTH HIP JOINTS: ICD-10-CM

## 2018-08-15 DIAGNOSIS — N95.1 MENOPAUSAL SYMPTOMS: ICD-10-CM

## 2018-08-15 DIAGNOSIS — I10 ESSENTIAL HYPERTENSION: Primary | ICD-10-CM

## 2018-08-15 PROCEDURE — 1036F TOBACCO NON-USER: CPT | Performed by: FAMILY MEDICINE

## 2018-08-15 PROCEDURE — G8417 CALC BMI ABV UP PARAM F/U: HCPCS | Performed by: FAMILY MEDICINE

## 2018-08-15 PROCEDURE — G8427 DOCREV CUR MEDS BY ELIG CLIN: HCPCS | Performed by: FAMILY MEDICINE

## 2018-08-15 PROCEDURE — 99213 OFFICE O/P EST LOW 20 MIN: CPT | Performed by: FAMILY MEDICINE

## 2018-08-15 RX ORDER — FLUCONAZOLE 150 MG/1
150 TABLET ORAL ONCE
Qty: 1 TABLET | Refills: 1 | Status: SHIPPED | OUTPATIENT
Start: 2018-08-15 | End: 2018-08-15

## 2018-08-15 ASSESSMENT — ENCOUNTER SYMPTOMS
CHEST TIGHTNESS: 0
SHORTNESS OF BREATH: 0
EYE PAIN: 0
EYE DISCHARGE: 0
FACIAL SWELLING: 0
WHEEZING: 0
ABDOMINAL PAIN: 0
EYE ITCHING: 0
SORE THROAT: 0
BACK PAIN: 0
NAUSEA: 0
BLOOD IN STOOL: 0
DIARRHEA: 0
CONSTIPATION: 0
SINUS PRESSURE: 0
ABDOMINAL DISTENTION: 0
PHOTOPHOBIA: 0
VOICE CHANGE: 0
RHINORRHEA: 0
VOMITING: 0
COLOR CHANGE: 0
COUGH: 0
EYE REDNESS: 0

## 2018-08-15 NOTE — PROGRESS NOTES
Subjective:      Patient ID: Ronal Viera is a 37 y.o. female. HPI  Here for follow up of hypertension and has been feeling somewhat better since she has been seeing Robel Peters for her mood. She has been continuing to have some depression and anxiety as well as some PMDD symptoms prior to her menses. She has been generally managing pain well with her hips and knees and blood pressure has been well controlled. She has been feeling like there has been some increased hot flashes, mood swings, and more irregularity in her periods. Review of Systems   Constitutional: Negative for activity change, appetite change, chills, diaphoresis, fatigue, fever and unexpected weight change. HENT: Negative for congestion, ear pain, facial swelling, hearing loss, postnasal drip, rhinorrhea, sinus pressure, sore throat and voice change. Eyes: Negative for photophobia, pain, discharge, redness, itching and visual disturbance. Respiratory: Negative for cough, chest tightness, shortness of breath and wheezing. Cardiovascular: Negative for chest pain and palpitations. Gastrointestinal: Negative for abdominal distention, abdominal pain, blood in stool, constipation, diarrhea, nausea and vomiting. Endocrine: Negative for cold intolerance and heat intolerance. Genitourinary: Negative for decreased urine volume, difficulty urinating, dysuria, flank pain, frequency, hematuria, pelvic pain, urgency, vaginal bleeding and vaginal discharge. Musculoskeletal: Negative for arthralgias, back pain, gait problem, joint swelling, myalgias, neck pain and neck stiffness. Skin: Negative for color change, pallor and rash. Allergic/Immunologic: Negative for environmental allergies and food allergies. Neurological: Negative for dizziness, tremors, seizures, syncope, facial asymmetry, speech difficulty, weakness, numbness and headaches.    Psychiatric/Behavioral: Negative for agitation, behavioral problems, dysphoric mood and sleep disturbance. The patient is not nervous/anxious and is not hyperactive. Objective:   Physical Exam   Constitutional: She is oriented to person, place, and time. She appears well-developed and well-nourished. She does not appear ill. No distress. HENT:   Head: Normocephalic and atraumatic. Right Ear: External ear normal.   Left Ear: External ear normal.   Nose: Nose normal. No mucosal edema or rhinorrhea. Mouth/Throat: Mucous membranes are normal. No oropharyngeal exudate, posterior oropharyngeal edema or posterior oropharyngeal erythema. Eyes: Pupils are equal, round, and reactive to light. EOM are normal. Right eye exhibits no discharge. Left eye exhibits no discharge. No scleral icterus. Neck: Trachea normal and normal range of motion. Neck supple. No JVD present. Carotid bruit is not present. No tracheal deviation present. No thyromegaly present. Cardiovascular: Normal rate, regular rhythm, S1 normal, S2 normal, normal heart sounds and normal pulses. Exam reveals no gallop, no S3, no S4, no distant heart sounds and no friction rub. No murmur heard. Pulmonary/Chest: No respiratory distress. She has no decreased breath sounds. She has no wheezes. She has no rhonchi. She has no rales. Abdominal: Soft. Normal appearance and bowel sounds are normal. There is no hepatosplenomegaly. There is no tenderness. There is no CVA tenderness. No hernia. Lymphadenopathy:     She has no cervical adenopathy. Right cervical: No superficial cervical adenopathy present. Left cervical: No superficial cervical adenopathy present. Neurological: She is alert and oriented to person, place, and time. She has normal strength. No cranial nerve deficit or sensory deficit. Coordination and gait normal.   Reflex Scores:       Brachioradialis reflexes are 2+ on the right side and 2+ on the left side. Patellar reflexes are 2+ on the right side and 2+ on the left side.        Achilles reflexes are 2+ on the right side and 2+ on the left side. Skin: Skin is warm and dry. No lesion and no rash noted. She is not diaphoretic. No cyanosis. Nails show no clubbing. Psychiatric: She has a normal mood and affect. Her speech is normal and behavior is normal. Judgment and thought content normal. Cognition and memory are normal.     Vitals:    08/15/18 1410   BP: 116/72   Pulse: 60   SpO2: 98%   Weight: 163 lb 3.2 oz (74 kg)       Assessment:          Plan:      1. Essential hypertension  - Discussed the role of hypertension in development of other disease courses such as CHF and atherosclerosis. - Encouraged patient to avoid sodium in the diet and reminded that the majority of sodium comes from packaged foods in cans and boxes which should be avoided where possible. - Encouraged good hydration and avoidance of caffeine where possible. - Discussed goals for blood pressure monitoring which should be 130/80. 2. Pain of both hip joints  Has been getting appropriate analgesic relief with no complications from the medications. 3. Menopausal symptoms  Discussed the option to use estroven and or black cohosh for symptoms relief.              Katerina Sahu MD

## 2018-08-15 NOTE — PROGRESS NOTES
Subjective:      Patient ID: Arnold Fonseca is a 37 y.o. female. Visit Information    Have you changed or started any medications since your last visit including any over-the-counter medicines, vitamins, or herbal medicines? no   Are you having any side effects from any of your medications? -  no  Have you stopped taking any of your medications? Is so, why? -  no    Have you seen any other physician or provider since your last visit? No  Have you had any other diagnostic tests since your last visit? No  Have you been seen in the emergency room and/or had an admission to a hospital since we last saw you? No  Have you had your routine dental cleaning in the past 6 months? yes -     Have you activated your [employer] account? If not, what are your barriers?  Yes     Patient Care Team:  Alvaro Hutson MD as PCP - Giana Delcid MD as PCP - [facility] Attributed Provider  Sebastian Simpson DPM as Surgeon (Podiatry)  [facility] (Emergency Medicine)    Medical History Review  Past Medical, Family, and Social History reviewed and  contribute to the patient presenting condition    Health Maintenance   Topic Date Due    HIV screen  05/21/1988    DTaP/Tdap/Td vaccine (1 - Tdap) 05/21/1992    Cervical cancer screen  06/16/2018    Potassium monitoring  06/16/2018    Creatinine monitoring  06/16/2018    Flu vaccine (1) 09/01/2018    Lipid screen  06/16/2022       HPI    Review of Systems    Objective:   Physical Exam    Assessment / Plan:

## 2018-08-15 NOTE — TELEPHONE ENCOUNTER
From: Celia Angeol  To: Adry Ramirez MD  Sent: 8/15/2018 5:30 PM EDT  Subject: Non-Urgent Medical Question    Thank you!!! I really needed this appointment! Can you please order my Diflucan for my yeast infection? Sorry I didn't mention it.

## 2018-08-24 ENCOUNTER — INITIAL CONSULT (OUTPATIENT)
Dept: BEHAVIORAL/MENTAL HEALTH | Age: 43
End: 2018-08-24
Payer: MEDICARE

## 2018-08-24 DIAGNOSIS — F31.75 BIPOLAR I DISORDER, CURRENT OR MOST RECENT EPISODE DEPRESSED, IN PARTIAL REMISSION (HCC): Primary | ICD-10-CM

## 2018-08-24 PROCEDURE — 90837 PSYTX W PT 60 MINUTES: CPT | Performed by: SOCIAL WORKER
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
## 2018-08-27 DIAGNOSIS — M25.559 ARTHRALGIA OF HIP, UNSPECIFIED LATERALITY: ICD-10-CM

## 2018-08-27 RX ORDER — TRAMADOL HYDROCHLORIDE 50 MG/1
TABLET ORAL
Qty: 180 TABLET | Refills: 0 | Status: SHIPPED | OUTPATIENT
Start: 2018-08-27 | End: 2018-09-27 | Stop reason: SDUPTHER

## 2018-08-27 NOTE — TELEPHONE ENCOUNTER
Last visit:8/15/18  Last Med refill:18    Next Visit Date:  Future Appointments  Date Time Provider Elbert Warneri   2018 1:00 PM WINNIE Walton PS [facility]   2/15/2019 1:40 PM Pérez Ku  [address] [facility] Maintenance   Topic Date Due    HIV screen  1988    DTaP/Tdap/Td vaccine (1 - Tdap) 1992    Cervical cancer screen  2018    Potassium monitoring  2018    Creatinine monitoring  2018    Flu vaccine (1) 2018    Lipid screen  2022       No results found for: LABA1C          ( goal A1C is < 7)   No results found for: LABMICR  LDL Cholesterol (mg/dL)   Date Value   2017 77   2016 74       (goal LDL is <100)   AST (U/L)   Date Value   2017 17     ALT (U/L)   Date Value   2017 18     BUN (mg/dL)   Date Value   2017 10     BP Readings from Last 3 Encounters:   08/15/18 116/72   06/15/18 (!) 148/88   18 (!) 158/99          (goal 120/80)    All Future Testing planned in [facility]  Lab Frequency Next Occurrence   Chlamydia/GC DNA, Urine Once 2018   US Pelvis Complete Once 2019   US NON OB TRANSVAGINAL Once 2019               Patient Active Problem List:     Seasonal allergies     HTN (hypertension)     Iron (Fe) deficiency anemia     Obesity     GERD (gastroesophageal reflux disease)     Bell's palsy     Post-nasal drip     Nasal obstruction     Nasal congestion     Raynaud's phenomenon without gangrene     History of low transverse  section     Bipolar 1 disorder, depressed ([facility] [address]     Post traumatic stress disorder

## 2018-09-14 ENCOUNTER — INITIAL CONSULT (OUTPATIENT)
Dept: BEHAVIORAL/MENTAL HEALTH | Age: 43
End: 2018-09-14
Payer: MEDICARE

## 2018-09-14 DIAGNOSIS — F33.0 MAJOR DEPRESSIVE DISORDER, RECURRENT EPISODE, MILD (HCC): Primary | ICD-10-CM

## 2018-09-14 PROCEDURE — 90834 PSYTX W PT 45 MINUTES: CPT | Performed by: SOCIAL WORKER

## 2018-09-14 NOTE — PROGRESS NOTES
Behavioral Health Consultation  JONATHON Garcia, WINNIE, [facility]  9/14/2018  3:40 PM    Time spent with Patient: 45 minutes  This is patient's 3rd  [place] consultation. Reason for Consult:  Bipolar Disorder  Referring Provider: Rosario Lee MD    Pt provided informed consent for the behavioral health program. Discussed with patient model of service to include the limits of confidentiality (i.e. abuse reporting, suicide intervention, etc.) and short-term intervention focused approach. Pt indicated understanding. Feedback given to PCP. S:   She has been very fearful and worried lately. She is concerned that she has done/said some things that God will not forgive her for. She feels like she is \"fuzzy\" lately and that her thoughts are racing a bit or a little scattered. She admits to some obsessing that has been increasing lately and seems to be daily. She explained a hx of getting very fearful and obsessive of \"being a rapist\" or a child molester especially when her children were growing up. She is a bit obsessive with spiritual fears lately. (fear of demons messing with her, thinking that because the Bible opened to a certain verse that this is a message/warning or pertains to her that moment). O:  MSE:     Appearance    alert, cooperative  Appetite normal  Sleep disturbance Yes  Fatigue Yes  Loss of pleasure Yes  Impulsive behavior No  Speech    spontaneous and normal rate  Mood    Depressed (mild) with anxiety today  Affect  More anxious  Thought Content    excessive preoccupations- obsessions  Thought Process    overabundance of ideas  Associations    logical connections  Insight    Good  Judgment    Intact  Orientation    oriented to person, place, time, and general circumstances  Memory    recent and remote memory intact  Attention/Concentration    intact  Morbid ideation No  Suicide Assessment    no suicidal ideation    A:   Patient was pleasant and engaging.   Her mood is a bit more anxious today. She feels the depression is about the same. Her affect is a bit anxious. She denies suicidal ideations. She is a bit obsessive today in her thoughts and fears, mostly Quaker based. She described feeling \"fuzzy\" today and was concerned that I saw her as acting \"fuzzy and distracted\". I think this is indicative of some racing thoughts that she is dealing with which are leading to some obsessions. She has a lot of fear- mostly Quaker based and fearful that she will blaspheme God or do some horrible act to someone. She denies desire to harm self or anyone else. I spent a bit of time educating on obsessions and fear connection (CBT). No reported drug/ETOH abuse- use of occasional wine. I agree to have her return for monthly supportive therapy at this time. Diagnosis:  The encounter diagnosis was Major depressive disorder, recurrent episode, mild ([facility] [address]       Diagnosis Date    Bell's palsy     Bipolar 1 disorder, depressed ([facility] [address] 7/12/2018    GERD (gastroesophageal reflux disease)     HTN (hypertension)     Iron (Fe) deficiency anemia     Obesity     Post-nasal drip     Seasonal allergies        History:    Medications:   Current Outpatient Prescriptions   Medication Sig Dispense Refill    traMADol (ULTRAM) 50 MG tablet take 1 to 2 tablets by mouth every 8 hours if needed for pain 180 tablet 0    mometasone (NASONEX) 50 MCG/ACT nasal spray 2 sprays each nostril QD 1 Inhaler 5    triamterene-hydrochlorothiazide (MAXZIDE-25) 37.5-25 MG per tablet Take 1 tablet by mouth daily 90 tablet 3    hydrOXYzine (VISTARIL) 25 MG capsule take 1-2 capsules by mouth every evening  0    furosemide (LASIX) 40 MG tablet take 1 tablet by mouth once daily 30 tablet 11    lisinopril (PRINIVIL;ZESTRIL) 40 MG tablet take 1 tablet by mouth once daily 90 tablet 3    carvedilol (COREG) 12.5 MG tablet take 1 tablet by mouth twice a day 60 tablet 5    lidocaine (XYLOCAINE) 5 % ointment Apply topically as needed. 1 Tube 1    ferrous sulfate 325 (65 FE) MG tablet Take 325 mg by mouth 2 times daily. No current facility-administered medications for this visit. Social History:   Social History     Social History    Marital status:      Spouse name: N/A    Number of children: N/A    Years of education: N/A     Occupational History    Not on file. Social History Main Topics    Smoking status: Former Smoker     Quit date: 1/2/1995    Smokeless tobacco: Never Used    Alcohol use Yes      Comment: occational wine    Drug use: No    Sexual activity: Yes     Partners: Male     Birth control/ protection: Surgical      Comment: TL 1998     Other Topics Concern    Not on file     Social History Narrative    No narrative on file       TOBACCO:   reports that she quit smoking about 23 years ago. She has never used smokeless tobacco.  ETOH:   reports that she drinks alcohol. Family History:   Family History   Problem Relation Age of Onset    High Blood Pressure Mother     Coronary Art Dis Mother     Thyroid Disease Mother     Depression Mother     High Blood Pressure Father     Thyroid Disease Father     Depression Father          Plan:  Pt interventions:  Discussed potential treatments for  anxiety and bipolar, mood disorders, Provided education, Discussed self-care (sleep, nutrition, rewarding activities, social support, exercise), Provided education on PTSD symptoms and treatment options for evidence-based treatment (Cognitive Processing Therapy and Prolonged Exposure), Established rapport, [place]-setting to identify pt's primary goals for [facility] visit / overall health, Supportive techniques, Emphasized self-care as important for managing overall health and Provided Psychoeducation re: Post trauma issues and recovery      Pt Behavioral Change Plan:  Patient is returning for supportive counseling. There are no Patient Instructions on file for this visit.

## 2018-09-17 ENCOUNTER — TELEPHONE (OUTPATIENT)
Dept: FAMILY MEDICINE CLINIC | Age: 43
End: 2018-09-17

## 2018-09-17 DIAGNOSIS — M25.551 PAIN OF RIGHT HIP JOINT: ICD-10-CM

## 2018-09-17 DIAGNOSIS — F51.01 PRIMARY INSOMNIA: Primary | ICD-10-CM

## 2018-09-17 RX ORDER — TRAZODONE HYDROCHLORIDE 50 MG/1
50 TABLET ORAL NIGHTLY
Qty: 30 TABLET | Refills: 0 | Status: SHIPPED | OUTPATIENT
Start: 2018-09-17 | End: 2021-04-20

## 2018-09-17 RX ORDER — HYDROCODONE BITARTRATE AND ACETAMINOPHEN 10; 325 MG/1; MG/1
TABLET ORAL
Qty: 40 TABLET | Refills: 0 | Status: SHIPPED | OUTPATIENT
Start: 2018-09-17 | End: 2018-09-24

## 2018-09-22 ENCOUNTER — PATIENT MESSAGE (OUTPATIENT)
Dept: FAMILY MEDICINE CLINIC | Age: 43
End: 2018-09-22

## 2018-09-23 ENCOUNTER — PATIENT MESSAGE (OUTPATIENT)
Dept: FAMILY MEDICINE CLINIC | Age: 43
End: 2018-09-23

## 2018-09-24 NOTE — TELEPHONE ENCOUNTER
From: Scotty Crockett  To: Tavares Michele MD  Sent: 9/22/2018 8:25 AM EDT  Subject: Non-Urgent Medical Question    Good Morning. The Trazodone that was prescribed for me gave me horrible side effects. I was able to sleep throughout the night but my depression and anxiety has been unbearable since the morning after the first pill. Thank you for trying to help me I really appreciate everything that you do. Is it anything that I can be prescribed that don't have those side effects? If so can I please get a prescription?
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
## 2018-09-25 ENCOUNTER — PATIENT MESSAGE (OUTPATIENT)
Dept: FAMILY MEDICINE CLINIC | Age: 43
End: 2018-09-25

## 2018-09-25 ENCOUNTER — TELEPHONE (OUTPATIENT)
Dept: FAMILY MEDICINE CLINIC | Age: 43
End: 2018-09-25

## 2018-09-25 DIAGNOSIS — F51.01 PRIMARY INSOMNIA: Primary | ICD-10-CM

## 2018-09-25 NOTE — TELEPHONE ENCOUNTER
Patient is calling to ask that you prescribe something to help her sleep. She stopped the tramadol a couple of days ago due to side effects.

## 2018-09-25 NOTE — TELEPHONE ENCOUNTER
If she'd be willing to come in an  a prescription and a voucher I'd like to try her on Belsomra and she can try three different dosage strengths for free with the voucher we have.

## 2018-09-25 NOTE — TELEPHONE ENCOUNTER
From: Ana Barlow  To: Quynh Diehl MD  Sent: 9/25/2018 3:37 AM EDT  Subject: Non-Urgent Medical Question    Syd Nassar I changed my mind on waiting for a prescription for insomnia. Please prescribe me something as so as possible so I can get some sleep.

## 2018-09-27 DIAGNOSIS — M25.559 ARTHRALGIA OF HIP, UNSPECIFIED LATERALITY: ICD-10-CM

## 2018-09-28 RX ORDER — TRAMADOL HYDROCHLORIDE 50 MG/1
TABLET ORAL
Qty: 180 TABLET | Refills: 0 | Status: SHIPPED | OUTPATIENT
Start: 2018-09-28 | End: 2018-10-25

## 2018-09-28 NOTE — TELEPHONE ENCOUNTER
Last visit: 8-15-18  Oars: 18    Next Visit Date:  Future Appointments  Date Time Provider Elbert Annita   10/5/2018 1:00 PM WINNIE Figueredo [place] [facility]   2/15/2019 1:40 PM Li Jennings  [address] [facility] Maintenance   Topic Date Due    HIV screen  1988    DTaP/Tdap/Td vaccine (1 - Tdap) 1992    Cervical cancer screen  2018    Potassium monitoring  2018    Creatinine monitoring  2018    Flu vaccine (1) 2018    Lipid screen  2022       No results found for: LABA1C          ( goal A1C is < 7)   No results found for: LABMICR  LDL Cholesterol (mg/dL)   Date Value   2017 77   2016 74       (goal LDL is <100)   AST (U/L)   Date Value   2017 17     ALT (U/L)   Date Value   2017 18     BUN (mg/dL)   Date Value   2017 10     BP Readings from Last 3 Encounters:   08/15/18 116/72   06/15/18 (!) 148/88   18 (!) 158/99          (goal 120/80)    All Future Testing planned in [facility]  Lab Frequency Next Occurrence   Chlamydia/GC DNA, Urine Once 2018   US Pelvis Complete Once 2019   US NON OB TRANSVAGINAL Once 2019               Patient Active Problem List:     Seasonal allergies     HTN (hypertension)     Iron (Fe) deficiency anemia     Obesity     GERD (gastroesophageal reflux disease)     Bell's palsy     Post-nasal drip     Nasal obstruction     Nasal congestion     Raynaud's phenomenon without gangrene     History of low transverse  section     Bipolar 1 disorder, depressed ([facility] [address]     Post traumatic stress disorder     Primary insomnia

## 2018-10-05 ENCOUNTER — INITIAL CONSULT (OUTPATIENT)
Dept: BEHAVIORAL/MENTAL HEALTH | Age: 43
End: 2018-10-05
Payer: MEDICARE

## 2018-10-05 DIAGNOSIS — F33.1 MAJOR DEPRESSIVE DISORDER, RECURRENT EPISODE, MODERATE DEGREE (HCC): Primary | ICD-10-CM

## 2018-10-05 DIAGNOSIS — F43.10 POST TRAUMATIC STRESS DISORDER: ICD-10-CM

## 2018-10-05 PROCEDURE — 90837 PSYTX W PT 60 MINUTES: CPT | Performed by: SOCIAL WORKER

## 2018-10-15 DIAGNOSIS — M25.551 PAIN OF BOTH HIP JOINTS: Primary | ICD-10-CM

## 2018-10-15 DIAGNOSIS — M25.552 PAIN OF BOTH HIP JOINTS: Primary | ICD-10-CM

## 2018-10-15 RX ORDER — HYDROCODONE BITARTRATE AND ACETAMINOPHEN 10; 325 MG/1; MG/1
1 TABLET ORAL EVERY 6 HOURS PRN
Qty: 120 TABLET | Refills: 0 | Status: SHIPPED | OUTPATIENT
Start: 2018-10-15 | End: 2018-11-16 | Stop reason: SDUPTHER

## 2018-10-20 ENCOUNTER — PATIENT MESSAGE (OUTPATIENT)
Dept: FAMILY MEDICINE CLINIC | Age: 43
End: 2018-10-20

## 2018-10-22 ENCOUNTER — PATIENT MESSAGE (OUTPATIENT)
Dept: FAMILY MEDICINE CLINIC | Age: 43
End: 2018-10-22

## 2018-10-23 ENCOUNTER — PATIENT MESSAGE (OUTPATIENT)
Dept: FAMILY MEDICINE CLINIC | Age: 43
End: 2018-10-23

## 2018-10-25 ENCOUNTER — TELEPHONE (OUTPATIENT)
Dept: FAMILY MEDICINE CLINIC | Age: 43
End: 2018-10-25

## 2018-11-16 DIAGNOSIS — M25.552 PAIN OF BOTH HIP JOINTS: ICD-10-CM

## 2018-11-16 DIAGNOSIS — M25.551 PAIN OF BOTH HIP JOINTS: ICD-10-CM

## 2018-11-16 RX ORDER — HYDROCODONE BITARTRATE AND ACETAMINOPHEN 10; 325 MG/1; MG/1
1 TABLET ORAL EVERY 6 HOURS PRN
Qty: 120 TABLET | Refills: 0 | Status: SHIPPED | OUTPATIENT
Start: 2018-11-16 | End: 2018-12-14 | Stop reason: SDUPTHER

## 2018-11-16 NOTE — TELEPHONE ENCOUNTER
Oars 10/15/18        Next Visit Date:  Future Appointments  Date Time Provider Elbert Annita   2/15/2019 1:40 PM Carolee Oreilly  [address] [facility]   Topic Date Due    HIV screen  1988    DTaP/Tdap/Td vaccine (1 - Tdap) 1992    Cervical cancer screen  2018    Potassium monitoring  2018    Creatinine monitoring  2018    Flu vaccine (1) 2018    Lipid screen  2022       No results found for: LABA1C          ( goal A1C is < 7)   No results found for: LABMICR  LDL Cholesterol (mg/dL)   Date Value   2017 77   2016 74       (goal LDL is <100)   AST (U/L)   Date Value   2017 17     ALT (U/L)   Date Value   2017 18     BUN (mg/dL)   Date Value   2017 10     BP Readings from Last 3 Encounters:   08/15/18 116/72   06/15/18 (!) 148/88   18 (!) 158/99          (goal 120/80)    All Future Testing planned in [facility]  Lab Frequency Next Occurrence   Chlamydia/GC DNA, Urine Once 2018   US Pelvis Complete Once 2019   US NON OB TRANSVAGINAL Once 2019               Patient Active Problem List:     Seasonal allergies     HTN (hypertension)     Iron (Fe) deficiency anemia     Obesity     GERD (gastroesophageal reflux disease)     Bell's palsy     Post-nasal drip     Nasal obstruction     Nasal congestion     Raynaud's phenomenon without gangrene     History of low transverse  section     Bipolar 1 disorder, depressed ([facility] [address]     Post traumatic stress disorder     Primary insomnia

## 2018-12-03 ENCOUNTER — TELEPHONE (OUTPATIENT)
Dept: FAMILY MEDICINE CLINIC | Age: 43
End: 2018-12-03

## 2018-12-03 RX ORDER — DOXYCYCLINE HYCLATE 100 MG
100 TABLET ORAL 2 TIMES DAILY
Qty: 20 TABLET | Refills: 0 | Status: SHIPPED | OUTPATIENT
Start: 2018-12-03 | End: 2018-12-13

## 2018-12-03 RX ORDER — PREDNISONE 20 MG/1
TABLET ORAL
Qty: 18 TABLET | Refills: 0 | Status: SHIPPED | OUTPATIENT
Start: 2018-12-03 | End: 2018-12-13

## 2018-12-14 DIAGNOSIS — M25.552 PAIN OF BOTH HIP JOINTS: ICD-10-CM

## 2018-12-14 DIAGNOSIS — M25.551 PAIN OF BOTH HIP JOINTS: ICD-10-CM

## 2018-12-14 RX ORDER — HYDROCODONE BITARTRATE AND ACETAMINOPHEN 10; 325 MG/1; MG/1
1 TABLET ORAL EVERY 6 HOURS PRN
Qty: 120 TABLET | Refills: 0 | Status: SHIPPED | OUTPATIENT
Start: 2018-12-14 | End: 2019-01-15 | Stop reason: SDUPTHER

## 2019-01-15 DIAGNOSIS — M25.551 PAIN OF BOTH HIP JOINTS: ICD-10-CM

## 2019-01-15 DIAGNOSIS — M25.552 PAIN OF BOTH HIP JOINTS: ICD-10-CM

## 2019-01-15 RX ORDER — HYDROCODONE BITARTRATE AND ACETAMINOPHEN 10; 325 MG/1; MG/1
1 TABLET ORAL EVERY 6 HOURS PRN
Qty: 120 TABLET | Refills: 0 | Status: SHIPPED | OUTPATIENT
Start: 2019-01-15 | End: 2019-02-18 | Stop reason: SDUPTHER

## 2019-01-24 ENCOUNTER — INITIAL CONSULT (OUTPATIENT)
Dept: BEHAVIORAL/MENTAL HEALTH | Age: 44
End: 2019-01-24
Payer: MEDICARE

## 2019-01-24 DIAGNOSIS — F33.0 MAJOR DEPRESSIVE DISORDER, RECURRENT EPISODE, MILD (HCC): Primary | ICD-10-CM

## 2019-01-24 DIAGNOSIS — F43.10 POST TRAUMATIC STRESS DISORDER: ICD-10-CM

## 2019-01-24 PROCEDURE — 90834 PSYTX W PT 45 MINUTES: CPT | Performed by: SOCIAL WORKER

## 2019-02-07 ENCOUNTER — TELEPHONE (OUTPATIENT)
Dept: FAMILY MEDICINE CLINIC | Age: 44
End: 2019-02-07

## 2019-02-07 RX ORDER — DOXYCYCLINE HYCLATE 100 MG
100 TABLET ORAL 2 TIMES DAILY
Qty: 20 TABLET | Refills: 0 | Status: SHIPPED | OUTPATIENT
Start: 2019-02-07 | End: 2019-02-17

## 2019-02-07 RX ORDER — LORATADINE AND PSEUDOEPHEDRINE 10; 240 MG/1; MG/1
1 TABLET, EXTENDED RELEASE ORAL DAILY
Qty: 30 TABLET | Refills: 0 | Status: SHIPPED | OUTPATIENT
Start: 2019-02-07 | End: 2019-06-11

## 2019-02-07 RX ORDER — BENZONATATE 200 MG/1
200 CAPSULE ORAL 3 TIMES DAILY PRN
Qty: 30 CAPSULE | Refills: 0 | Status: SHIPPED | OUTPATIENT
Start: 2019-02-07 | End: 2019-02-14

## 2019-02-15 ENCOUNTER — OFFICE VISIT (OUTPATIENT)
Dept: FAMILY MEDICINE CLINIC | Age: 44
End: 2019-02-15
Payer: MEDICARE

## 2019-02-15 VITALS
OXYGEN SATURATION: 98 % | HEART RATE: 54 BPM | DIASTOLIC BLOOD PRESSURE: 80 MMHG | HEIGHT: 60 IN | WEIGHT: 165.6 LBS | SYSTOLIC BLOOD PRESSURE: 118 MMHG | BODY MASS INDEX: 32.51 KG/M2

## 2019-02-15 DIAGNOSIS — I10 ESSENTIAL HYPERTENSION: ICD-10-CM

## 2019-02-15 DIAGNOSIS — K59.00 CONSTIPATION, UNSPECIFIED CONSTIPATION TYPE: ICD-10-CM

## 2019-02-15 DIAGNOSIS — D50.8 OTHER IRON DEFICIENCY ANEMIA: ICD-10-CM

## 2019-02-15 DIAGNOSIS — E78.2 MIXED HYPERLIPIDEMIA: Primary | ICD-10-CM

## 2019-02-15 PROCEDURE — G8484 FLU IMMUNIZE NO ADMIN: HCPCS | Performed by: FAMILY MEDICINE

## 2019-02-15 PROCEDURE — 1036F TOBACCO NON-USER: CPT | Performed by: FAMILY MEDICINE

## 2019-02-15 PROCEDURE — G8417 CALC BMI ABV UP PARAM F/U: HCPCS | Performed by: FAMILY MEDICINE

## 2019-02-15 PROCEDURE — G8427 DOCREV CUR MEDS BY ELIG CLIN: HCPCS | Performed by: FAMILY MEDICINE

## 2019-02-15 PROCEDURE — 99214 OFFICE O/P EST MOD 30 MIN: CPT | Performed by: FAMILY MEDICINE

## 2019-02-15 RX ORDER — FUROSEMIDE 40 MG/1
TABLET ORAL
Qty: 30 TABLET | Refills: 11 | Status: SHIPPED | OUTPATIENT
Start: 2019-02-15 | End: 2021-02-27 | Stop reason: SDUPTHER

## 2019-02-15 RX ORDER — CLOTRIMAZOLE AND BETAMETHASONE DIPROPIONATE 10; .5 MG/ML; MG/ML
LOTION TOPICAL
Qty: 1 BOTTLE | Refills: 3 | Status: SHIPPED | OUTPATIENT
Start: 2019-02-15 | End: 2020-07-20

## 2019-02-15 RX ORDER — MOMETASONE FUROATE 50 UG/1
SPRAY, METERED NASAL
Qty: 1 INHALER | Refills: 11 | Status: SHIPPED | OUTPATIENT
Start: 2019-02-15 | End: 2021-06-08 | Stop reason: ALTCHOICE

## 2019-02-15 ASSESSMENT — ENCOUNTER SYMPTOMS
SORE THROAT: 0
FACIAL SWELLING: 0
VOMITING: 0
SINUS PRESSURE: 0
EYE REDNESS: 0
BLOOD IN STOOL: 0
WHEEZING: 0
EYE PAIN: 0
EYE DISCHARGE: 0
SHORTNESS OF BREATH: 0
NAUSEA: 0
CONSTIPATION: 0
RHINORRHEA: 0
ABDOMINAL PAIN: 0
COLOR CHANGE: 0
DIARRHEA: 0
PHOTOPHOBIA: 0
BACK PAIN: 0
VOICE CHANGE: 0
CHEST TIGHTNESS: 0
ABDOMINAL DISTENTION: 0
COUGH: 0
EYE ITCHING: 0

## 2019-02-18 DIAGNOSIS — M25.552 PAIN OF BOTH HIP JOINTS: ICD-10-CM

## 2019-02-18 DIAGNOSIS — M25.551 PAIN OF BOTH HIP JOINTS: ICD-10-CM

## 2019-02-18 RX ORDER — HYDROCODONE BITARTRATE AND ACETAMINOPHEN 10; 325 MG/1; MG/1
1 TABLET ORAL EVERY 6 HOURS PRN
Qty: 120 TABLET | Refills: 0 | Status: SHIPPED | OUTPATIENT
Start: 2019-02-18 | End: 2019-03-19 | Stop reason: SDUPTHER

## 2019-02-25 RX ORDER — LISINOPRIL 40 MG/1
TABLET ORAL
Qty: 90 TABLET | Refills: 3 | Status: SHIPPED | OUTPATIENT
Start: 2019-02-25 | End: 2020-07-20

## 2019-03-01 ENCOUNTER — TELEPHONE (OUTPATIENT)
Dept: FAMILY MEDICINE CLINIC | Age: 44
End: 2019-03-01

## 2019-03-19 DIAGNOSIS — M25.551 PAIN OF BOTH HIP JOINTS: ICD-10-CM

## 2019-03-19 DIAGNOSIS — M25.552 PAIN OF BOTH HIP JOINTS: ICD-10-CM

## 2019-03-19 RX ORDER — HYDROCODONE BITARTRATE AND ACETAMINOPHEN 10; 325 MG/1; MG/1
1 TABLET ORAL EVERY 6 HOURS PRN
Qty: 120 TABLET | Refills: 0 | Status: SHIPPED | OUTPATIENT
Start: 2019-03-19 | End: 2019-04-18 | Stop reason: SDUPTHER

## 2019-04-18 DIAGNOSIS — M25.551 PAIN OF BOTH HIP JOINTS: ICD-10-CM

## 2019-04-18 DIAGNOSIS — M25.552 PAIN OF BOTH HIP JOINTS: ICD-10-CM

## 2019-04-18 RX ORDER — HYDROCODONE BITARTRATE AND ACETAMINOPHEN 10; 325 MG/1; MG/1
1 TABLET ORAL EVERY 6 HOURS PRN
Qty: 120 TABLET | Refills: 0 | Status: SHIPPED | OUTPATIENT
Start: 2019-04-18 | End: 2019-05-15 | Stop reason: SDUPTHER

## 2019-04-18 RX ORDER — CARVEDILOL 12.5 MG/1
TABLET ORAL
Qty: 60 TABLET | Refills: 5 | Status: SHIPPED | OUTPATIENT
Start: 2019-04-18 | End: 2020-05-04

## 2019-04-18 NOTE — TELEPHONE ENCOUNTER
Last visit: 2/15/19  Next Visit Date:  No future appointments.     Health Maintenance   Topic Date Due    HIV screen  1988    DTaP/Tdap/Td vaccine (1 - Tdap) 1992    Cervical cancer screen  2018    Potassium monitoring  2018    Creatinine monitoring  2018    Flu vaccine (Season Ended) 2020 (Originally 2019)    Diabetes screen  2020    Lipid screen  2022    Pneumococcal 0-64 years Vaccine  Aged Out       No results found for: LABA1C          ( goal A1C is < 7)   No results found for: LABMICR  LDL Cholesterol (mg/dL)   Date Value   2017 77   2016 74       (goal LDL is <100)   AST (U/L)   Date Value   2017 17     ALT (U/L)   Date Value   2017 18     BUN (mg/dL)   Date Value   2017 10     BP Readings from Last 3 Encounters:   02/15/19 118/80   08/15/18 116/72   06/15/18 (!) 148/88          (goal 120/80)    All Future Testing planned in [facility]  Lab Frequency Next Occurrence   Lipid Panel Once 02/15/2019   Comprehensive Metabolic Panel Once    Iron And TIBC Once 02/15/2019   Ferritin Once 2019   TSH Once 02/15/2019   CBC With Auto Differential Once 02/15/2019               Patient Active Problem List:     Seasonal allergies     HTN (hypertension)     Iron (Fe) deficiency anemia     Obesity     GERD (gastroesophageal reflux disease)     Bell's palsy     Post-nasal drip     Nasal obstruction     Nasal congestion     Raynaud's phenomenon without gangrene     History of low transverse  section     Bipolar 1 disorder, depressed ([facility] [address]     Post traumatic stress disorder     Primary insomnia

## 2019-04-18 NOTE — TELEPHONE ENCOUNTER
lv 2/15/19  Oars 3/19/19    Next Visit Date:  No future appointments.     Health Maintenance   Topic Date Due    HIV screen  1988    DTaP/Tdap/Td vaccine (1 - Tdap) 1992    Cervical cancer screen  2018    Potassium monitoring  2018    Creatinine monitoring  2018    Flu vaccine (Season Ended) 2020 (Originally 2019)    Diabetes screen  2020    Lipid screen  2022    Pneumococcal 0-64 years Vaccine  Aged Out       No results found for: LABA1C          ( goal A1C is < 7)   No results found for: LABMICR  LDL Cholesterol (mg/dL)   Date Value   2017 77   2016 74       (goal LDL is <100)   AST (U/L)   Date Value   2017 17     ALT (U/L)   Date Value   2017 18     BUN (mg/dL)   Date Value   2017 10     BP Readings from Last 3 Encounters:   02/15/19 118/80   08/15/18 116/72   06/15/18 (!) 148/88          (goal 120/80)    All Future Testing planned in [facility]  Lab Frequency Next Occurrence   Lipid Panel Once 02/15/2019   Comprehensive Metabolic Panel Once    Iron And TIBC Once 02/15/2019   Ferritin Once 2019   TSH Once 02/15/2019   CBC With Auto Differential Once 02/15/2019               Patient Active Problem List:     Seasonal allergies     HTN (hypertension)     Iron (Fe) deficiency anemia     Obesity     GERD (gastroesophageal reflux disease)     Bell's palsy     Post-nasal drip     Nasal obstruction     Nasal congestion     Raynaud's phenomenon without gangrene     History of low transverse  section     Bipolar 1 disorder, depressed ([facility] [address]     Post traumatic stress disorder     Primary insomnia

## 2019-05-15 DIAGNOSIS — M25.551 PAIN OF BOTH HIP JOINTS: ICD-10-CM

## 2019-05-15 DIAGNOSIS — M25.552 PAIN OF BOTH HIP JOINTS: ICD-10-CM

## 2019-05-15 RX ORDER — HYDROCODONE BITARTRATE AND ACETAMINOPHEN 10; 325 MG/1; MG/1
1 TABLET ORAL EVERY 6 HOURS PRN
Qty: 120 TABLET | Refills: 0 | Status: SHIPPED | OUTPATIENT
Start: 2019-05-15 | End: 2019-06-14 | Stop reason: SDUPTHER

## 2019-06-11 RX ORDER — LORATADINE AND PSEUDOEPHEDRINE 10; 240 MG/1; MG/1
1 TABLET, EXTENDED RELEASE ORAL DAILY
Qty: 14 TABLET | Refills: 0 | Status: SHIPPED | OUTPATIENT
Start: 2019-06-11 | End: 2019-06-25

## 2019-06-11 RX ORDER — DOXYCYCLINE HYCLATE 100 MG
100 TABLET ORAL 2 TIMES DAILY
Qty: 20 TABLET | Refills: 0 | Status: SHIPPED | OUTPATIENT
Start: 2019-06-11 | End: 2019-06-21

## 2019-06-11 NOTE — TELEPHONE ENCOUNTER
Patient is a 75 year old female  here for   Chief Complaint   Patient presents with   • Follow-up         History of Present Illness:   Patient continues to see her counselor for her PTSD. She goes from [facility]. No suicidal plan. But times thinks about it although would never do anything about it. She is on the Remeron she's thinking maybe an increased dose would be helpful for helping her sleep. She did see dermatologist who thought it was a contact dermatitis. She has had no nausea vomiting and shortness of breath or chest pain change in stools or problems with urination she has not been exercising    Past Medical History  Patient Active Problem List    Diagnosis Date Noted   • Irritant contact dermatitis 01/19/2017     Priority: Low     Saw dermatitis     • PTSD (post-traumatic stress disorder) 07/22/2016     Priority: Low   • Orthostatic hypotension 06/09/2016     Priority: Low   • Reactive depression (situational) 06/09/2016     Priority: Low   • TIA (transient ischemic attack) 06/03/2016     Priority: Low   • Hypercholesteremia 05/24/2016     Priority: Low   • Acute diarrhea 09/23/2015     Priority: Low   • Abdominal cramping 09/23/2015     Priority: Low   • Nausea 09/23/2015     Priority: Low   • Decreased appetite 09/23/2015     Priority: Low   • Black stool 09/23/2015     Priority: Low   • Lymphocytic colitis 08/14/2015     Priority: Low   • NSAID long-term use 05/11/2015     Priority: Low   • History of palpitations 05/11/2015     Priority: Low   • History of recurrent UTIs 05/29/2014     Priority: Low     Sees urology     • Osteopenia 05/02/2014     Priority: Low     Bone density 4.2  6/14/16  t score -2.4 left femur and -0.5 lumbar spine with frax of 15.4 major and 4.2 percent hip previous Bone density 5/17/13, T score of -2.3 in the left femoral neck and -1.2 in the lumbar spine     • History of colon polyps 05/02/2014     Priority: Low     Colonoscopy done 6/15/12 at that time was totally normal  Spoke to patient and informed her about meds at pharmacy GI at that time said further screening exams can be deferred     • Radicular pain 05/02/2014     Priority: Low     History of C7 radiculopathy with evidence of active deep degeneration and EMG showed weakness and mild nerve irritation and spondylitic process of the cervical bone she did see a specialist I recommended physical therapy she has been on a Medrol Dosepak in the past, she's also been on Neurontin, Vioxx, Flexeril, Mobic and Celebrex, she does have a traction unit at home. Also has evidence of degenerative disease at C5-C6     • Herpes simplex 05/02/2014     Priority: Low     History of of the face,     • S/P cardiac cath 05/02/2014     Priority: Low     2001 was negative     • GERD (gastroesophageal reflux disease) 05/02/2014     Priority: Low     History of esophageal dilation     • Essential tremor 05/02/2014     Priority: Low   • Overactive bladder 05/02/2014     Priority: Low     She did have a vaginal sling procedure and anterior posterior repair in 2004, she's been on many medications in the past including toviaz, Vesicare, and  she's also been on Detrol and oxybutynin which caused dizziness     • History of UTI 05/02/2014     Priority: Low     She has had history of urinary tract infection sensitive to Levaquin but resistant to self in the past      • Agatston coronary artery calcium score between 100 and 199 05/02/2014     Priority: Low     2012 did see the cardiologist Holter monitor was done which was normal a stress echo was also normal she was started on metoprolol     • Unspecified hypothyroidism 05/02/2014     Priority: Low     Past Surgical History   Procedure Laterality Date   • Vaginal hysterectomy w/ anterior and posterior vaginal repair  01/01/2004   • Appendectomy     • Eye surgery     • Hysterectomy     • Removal of tonsils,10+ y/o     • Colonoscopy diagnostic  04/01/2007     Negative   • Left heart cath,percutaneous  01/01/2001     Negative   • Esophageal dilation     • Colonoscopy  remove lesions by snare  06/15/2012     Dr. Rivas De La Cruz   • Colonoscopy  7/31/15     repeat in 5 years (2020)     Past Medical History   Diagnosis Date   • Arthritis    • Constipation    • DDD (degenerative disc disease), cervical    • Essential tremor    • GERD (gastroesophageal reflux disease)    • Herpes simplex      of the face   • Hypothyroid    • Neck pain    • Osteopenia    • Other and unspecified hyperlipidemia    • Overactive bladder    • Recurrent UTI        Present Med List    Current Outpatient Prescriptions on File Prior to Visit:  metroNIDAZOLE (METROGEL) 1 % gel   clopidogrel (PLAVIX) 75 MG tablet   metoPROLOL (LOPRESSOR) 25 MG tablet   pantoprazole (PROTONIX) 40 MG tablet   levothyroxine (SYNTHROID) 88 MCG tablet   calcitonin (MIACALCIN) 200 UNIT/ACT nasal spray   diclofenac (VOLTAREN) 1 % gel   Calcium 600 MG tablet   gabapentin (NEURONTIN) 300 MG capsule   simvastatin (ZOCOR) 20 MG tablet   hyoscyamine (LEVSIN SL) 0.125 MG SL tablet   Omega-3 Fatty Acids (FISH OIL) 1000 MG capsule   vitamin E 400 UNIT capsule   Melatonin 10 MG Tab   Cholecalciferol (VITAMIN D) 1000 UNITS capsule     No current facility-administered medications on file prior to visit.     Family History  Family History   Problem Relation Age of Onset   • Cancer Mother      luekemia   • Heart disease Mother    • High blood pressure Mother    • High cholesterol Mother    • Asthma Father    • Arthritis Father    • Heart disease Father    • Arthritis Sister    • High cholesterol Sister    • Diabetes Sister    • Diabetes Other      aunts   • Arthritis Brother    • COPD Brother         Social History  Social History     Social History   • Marital status:      Spouse name: N/A   • Number of children: N/A   • Years of education: N/A     Social History Main Topics   • Smoking status: Former Smoker     Years: 1.00     Types: Cigarettes   • Smokeless tobacco: Never Used   • Alcohol use 2.4 oz/week     4 Glasses of wine per week   •  Drug use: No   • Sexual activity: Not Asked     Other Topics Concern   • Seat Belt Yes     Social History Narrative       Review of Systems:   GI is negative for nausea vomiting  is negative for frequency or burning cardiac is negative for palpitations pulmonary is negative cough neuro is negative for headache  Physical Exam:  Vitals:    01/19/17 1458   BP: 111/69   Pulse: 66   Weight: 51.8 kg   Height: 5' 1\" (1.549 m)     Body mass index is 21.56 kg/(m^2).    Alert, HEENT headaches medical cephalic, she does have some dryness on that right side of her face, neck is supple heart normal S1-S2 lungs were clear abdomen is soft nontender legs no edema  Test results and procedures:  Lab Results   Component Value Date    SODIUM 138 06/02/2016    SODIUM 142 05/24/2016    POTASSIUM 4.0 06/02/2016    POTASSIUM 4.2 05/24/2016    CHLORIDE 103 06/02/2016    CHLORIDE 104 05/24/2016    CO2 28 06/02/2016    CO2 30 05/24/2016    BUN 15 06/02/2016    BUN 15 05/24/2016    CREATININE 0.94 06/03/2016    CREATININE 1.18 (H) 06/02/2016    GLUCOSE 108 (H) 06/02/2016    GLUCOSE 107 (H) 05/24/2016     Lab Results   Component Value Date    WBC 3.6 (L) 06/03/2016    WBC 5.4 06/02/2016    HCT 36.9 06/03/2016    HCT 38.2 06/02/2016    HGB 12.0 06/03/2016    HGB 12.6 06/02/2016     06/03/2016     06/02/2016     TSH (mcUnits/mL)   Date Value   05/24/2016 10.175 (H)   05/18/2015 0.501     Lab Results   Component Value Date    CHOLESTEROL 153 06/03/2016    CHOLESTEROL 180 06/02/2016    HDL 61 06/03/2016    HDL 77 06/02/2016    CALCLDL 70 06/03/2016    CALCLDL 88 06/02/2016    TRIGLYCERIDE 111 06/03/2016    TRIGLYCERIDE 73 06/02/2016     Lab Results   Component Value Date    AST 16 06/02/2016    AST 22 05/24/2016    GPT 19 06/02/2016    GPT 22 05/24/2016    ALKPT 45 06/02/2016    ALKPT 45 05/24/2016    BILIRUBIN 0.8 06/02/2016    BILIRUBIN 1.0 05/24/2016         Assessment/Plan:  1. PTSD (post-traumatic stress disorder)    2.  Irritant contact dermatitis, unspecified trigger    3. Osteopenia           Orders Placed This Encounter   • mirtazapine (REMERON) 15 MG tablet     Return for has appt in may for medicare wellness. she does need to continue seeing her counselor, did discuss getting some exercise for both that and for her osteopenia   I did increase her Remeron. She is to continue avoidance of certain things for her irritant contact dermatitis. Did discuss hep C and she would like to get when she comes in for her Medicare wellness.

## 2019-06-11 NOTE — TELEPHONE ENCOUNTER
Patient called wondering if you could call something into the pharmacy for a sinus infection. C/O dry cough, stuffy nose, drainage, its hard for her to sleep at night because when she lays down is when the drainage is at its worst. She has been taking mucinex which only seems to help her cough. Symptoms started Memorial Day weekend.

## 2019-06-14 DIAGNOSIS — M25.551 PAIN OF BOTH HIP JOINTS: ICD-10-CM

## 2019-06-14 DIAGNOSIS — M25.552 PAIN OF BOTH HIP JOINTS: ICD-10-CM

## 2019-06-14 RX ORDER — HYDROCODONE BITARTRATE AND ACETAMINOPHEN 10; 325 MG/1; MG/1
1 TABLET ORAL EVERY 6 HOURS PRN
Qty: 120 TABLET | Refills: 0 | Status: SHIPPED | OUTPATIENT
Start: 2019-06-14 | End: 2019-07-15 | Stop reason: SDUPTHER

## 2019-06-14 NOTE — TELEPHONE ENCOUNTER
Last visit: 2/15/19  Last Med refill: 5/15/19  Does patient have enough medication for 72 hours: No    Next Visit Date:  No future appointments.     Health Maintenance   Topic Date Due    HIV screen  1988    DTaP/Tdap/Td vaccine (1 - Tdap) 1992    Cervical cancer screen  2018    Potassium monitoring  2018    Creatinine monitoring  2018    Flu vaccine (Season Ended) 2020 (Originally 2019)    Diabetes screen  2020    Lipid screen  2022    Pneumococcal 0-64 years Vaccine  Aged Out       No results found for: LABA1C          ( goal A1C is < 7)   No results found for: LABMICR  LDL Cholesterol (mg/dL)   Date Value   2017 77   2016 74       (goal LDL is <100)   AST (U/L)   Date Value   2017 17     ALT (U/L)   Date Value   2017 18     BUN (mg/dL)   Date Value   2017 10     BP Readings from Last 3 Encounters:   02/15/19 118/80   08/15/18 116/72   06/15/18 (!) 148/88          (goal 120/80)    All Future Testing planned in [facility]  Lab Frequency Next Occurrence   Lipid Panel Once 02/15/2019   Comprehensive Metabolic Panel Once    Iron And TIBC Once 02/15/2019   Ferritin Once 2019   TSH Once 02/15/2019   CBC With Auto Differential Once 02/15/2019               Patient Active Problem List:     Seasonal allergies     HTN (hypertension)     Iron (Fe) deficiency anemia     Obesity     GERD (gastroesophageal reflux disease)     Bell's palsy     Post-nasal drip     Nasal obstruction     Nasal congestion     Raynaud's phenomenon without gangrene     History of low transverse  section     Bipolar 1 disorder, depressed ([facility] [address]     Post traumatic stress disorder     Primary insomnia

## 2019-07-15 DIAGNOSIS — M25.551 PAIN OF BOTH HIP JOINTS: ICD-10-CM

## 2019-07-15 DIAGNOSIS — M25.552 PAIN OF BOTH HIP JOINTS: ICD-10-CM

## 2019-07-15 RX ORDER — HYDROCODONE BITARTRATE AND ACETAMINOPHEN 10; 325 MG/1; MG/1
TABLET ORAL
Qty: 120 TABLET | Refills: 0 | OUTPATIENT
Start: 2019-07-15

## 2019-07-15 RX ORDER — TRIAMTERENE AND HYDROCHLOROTHIAZIDE 37.5; 25 MG/1; MG/1
TABLET ORAL
Qty: 90 TABLET | Refills: 3 | OUTPATIENT
Start: 2019-07-15

## 2019-07-15 RX ORDER — TRIAMTERENE AND HYDROCHLOROTHIAZIDE 37.5; 25 MG/1; MG/1
1 TABLET ORAL DAILY
Qty: 90 TABLET | Refills: 3 | Status: SHIPPED | OUTPATIENT
Start: 2019-07-15 | End: 2020-08-21 | Stop reason: SDUPTHER

## 2019-07-15 RX ORDER — HYDROCODONE BITARTRATE AND ACETAMINOPHEN 10; 325 MG/1; MG/1
1 TABLET ORAL EVERY 6 HOURS PRN
Qty: 120 TABLET | Refills: 0 | Status: SHIPPED | OUTPATIENT
Start: 2019-07-15 | End: 2019-08-14 | Stop reason: SDUPTHER

## 2019-07-15 NOTE — TELEPHONE ENCOUNTER
Next Visit Date:  No future appointments.     Health Maintenance   Topic Date Due    HIV screen  1988    DTaP/Tdap/Td vaccine (1 - Tdap) 1992    Cervical cancer screen  2018    Potassium monitoring  2018    Creatinine monitoring  2018    Flu vaccine (1) 2020 (Originally 2019)    Diabetes screen  2020    Lipid screen  2022    Annual Wellness Visit (AWV)  2036    Pneumococcal 0-64 years Vaccine  Aged Out       No results found for: LABA1C          ( goal A1C is < 7)   No results found for: LABMICR  LDL Cholesterol (mg/dL)   Date Value   2017 77   2016 74       (goal LDL is <100)   AST (U/L)   Date Value   2017 17     ALT (U/L)   Date Value   2017 18     BUN (mg/dL)   Date Value   2017 10     BP Readings from Last 3 Encounters:   02/15/19 118/80   08/15/18 116/72   06/15/18 (!) 148/88          (goal 120/80)    All Future Testing planned in [facility]  Lab Frequency Next Occurrence   Lipid Panel Once 02/15/2019   Comprehensive Metabolic Panel Once    Iron And TIBC Once 02/15/2019   Ferritin Once 2019   TSH Once 02/15/2019   CBC With Auto Differential Once 02/15/2019               Patient Active Problem List:     Seasonal allergies     HTN (hypertension)     Iron (Fe) deficiency anemia     Obesity     GERD (gastroesophageal reflux disease)     Bell's palsy     Post-nasal drip     Nasal obstruction     Nasal congestion     Raynaud's phenomenon without gangrene     History of low transverse  section     Bipolar 1 disorder, depressed ([facility] [address]     Post traumatic stress disorder     Primary insomnia

## 2019-08-14 DIAGNOSIS — M25.552 PAIN OF BOTH HIP JOINTS: ICD-10-CM

## 2019-08-14 DIAGNOSIS — M25.551 PAIN OF BOTH HIP JOINTS: ICD-10-CM

## 2019-08-14 RX ORDER — HYDROCODONE BITARTRATE AND ACETAMINOPHEN 10; 325 MG/1; MG/1
1 TABLET ORAL EVERY 6 HOURS PRN
Qty: 120 TABLET | Refills: 0 | Status: SHIPPED | OUTPATIENT
Start: 2019-08-14 | End: 2019-09-12 | Stop reason: SDUPTHER

## 2019-08-14 NOTE — TELEPHONE ENCOUNTER
oarrs 3/19/19    Last visit:  2/15/19    Last Med refill:7/15/19    Does patient have enough medication for 72 hours: no    Next Visit Date:  No future appointments.     Health Maintenance   Topic Date Due    HIV screen  1988    DTaP/Tdap/Td vaccine (1 - Tdap) 1992    Cervical cancer screen  2018    Potassium monitoring  2018    Creatinine monitoring  2018    Flu vaccine (1) 2020 (Originally 2019)    Diabetes screen  2020    Lipid screen  2022    Annual Wellness Visit (AWV)  2036    Pneumococcal 0-64 years Vaccine  Aged Out       No results found for: LABA1C          ( goal A1C is < 7)   No results found for: LABMICR  LDL Cholesterol (mg/dL)   Date Value   2017 77   2016 74       (goal LDL is <100)   AST (U/L)   Date Value   2017 17     ALT (U/L)   Date Value   2017 18     BUN (mg/dL)   Date Value   2017 10     BP Readings from Last 3 Encounters:   02/15/19 118/80   08/15/18 116/72   06/15/18 (!) 148/88          (goal 120/80)    All Future Testing planned in [facility]  Lab Frequency Next Occurrence   Lipid Panel Once 02/15/2019   Comprehensive Metabolic Panel Once    Iron And TIBC Once 02/15/2019   Ferritin Once 2019   TSH Once 02/15/2019   CBC With Auto Differential Once 02/15/2019               Patient Active Problem List:     Seasonal allergies     HTN (hypertension)     Iron (Fe) deficiency anemia     Obesity     GERD (gastroesophageal reflux disease)     Bell's palsy     Post-nasal drip     Nasal obstruction     Nasal congestion     Raynaud's phenomenon without gangrene     History of low transverse  section     Bipolar 1 disorder, depressed ([facility] [address]     Post traumatic stress disorder     Primary insomnia

## 2019-09-12 ENCOUNTER — TELEPHONE (OUTPATIENT)
Dept: FAMILY MEDICINE CLINIC | Age: 44
End: 2019-09-12

## 2019-09-12 DIAGNOSIS — M25.552 PAIN OF BOTH HIP JOINTS: ICD-10-CM

## 2019-09-12 DIAGNOSIS — M25.551 PAIN OF BOTH HIP JOINTS: ICD-10-CM

## 2019-09-12 RX ORDER — HYDROCODONE BITARTRATE AND ACETAMINOPHEN 10; 325 MG/1; MG/1
1 TABLET ORAL EVERY 6 HOURS PRN
Qty: 120 TABLET | Refills: 0 | Status: SHIPPED | OUTPATIENT
Start: 2019-09-12 | End: 2019-09-16 | Stop reason: ALTCHOICE

## 2019-09-16 ENCOUNTER — OFFICE VISIT (OUTPATIENT)
Dept: FAMILY MEDICINE CLINIC | Age: 44
End: 2019-09-16
Payer: MEDICARE

## 2019-09-16 VITALS
DIASTOLIC BLOOD PRESSURE: 78 MMHG | BODY MASS INDEX: 31.05 KG/M2 | SYSTOLIC BLOOD PRESSURE: 124 MMHG | OXYGEN SATURATION: 94 % | TEMPERATURE: 97.5 F | WEIGHT: 159 LBS | HEART RATE: 82 BPM

## 2019-09-16 DIAGNOSIS — Z86.69 H/O BELL'S PALSY: ICD-10-CM

## 2019-09-16 DIAGNOSIS — G89.29 CHRONIC NECK PAIN: ICD-10-CM

## 2019-09-16 DIAGNOSIS — L70.0 ACNE VULGARIS: ICD-10-CM

## 2019-09-16 DIAGNOSIS — M79.601 RIGHT ARM PAIN: Primary | ICD-10-CM

## 2019-09-16 DIAGNOSIS — R29.810 FACIAL DROOP: ICD-10-CM

## 2019-09-16 DIAGNOSIS — M54.2 CHRONIC NECK PAIN: ICD-10-CM

## 2019-09-16 PROCEDURE — 1036F TOBACCO NON-USER: CPT | Performed by: NURSE PRACTITIONER

## 2019-09-16 PROCEDURE — G8417 CALC BMI ABV UP PARAM F/U: HCPCS | Performed by: NURSE PRACTITIONER

## 2019-09-16 PROCEDURE — 99214 OFFICE O/P EST MOD 30 MIN: CPT | Performed by: NURSE PRACTITIONER

## 2019-09-16 PROCEDURE — G8427 DOCREV CUR MEDS BY ELIG CLIN: HCPCS | Performed by: NURSE PRACTITIONER

## 2019-09-16 RX ORDER — DOXYCYCLINE HYCLATE 100 MG
100 TABLET ORAL 2 TIMES DAILY
Qty: 90 TABLET | Refills: 0 | Status: SHIPPED | OUTPATIENT
Start: 2019-09-16 | End: 2019-10-31

## 2019-09-16 RX ORDER — ADAPALENE AND BENZOYL PEROXIDE .1; 2.5 G/100G; G/100G
GEL TOPICAL
Qty: 1 TUBE | Refills: 6 | Status: SHIPPED | OUTPATIENT
Start: 2019-09-16 | End: 2020-05-04

## 2019-09-16 RX ORDER — PREDNISONE 10 MG/1
10 TABLET ORAL
Qty: 15 TABLET | Refills: 0 | Status: SHIPPED | OUTPATIENT
Start: 2019-09-16 | End: 2019-09-21

## 2019-09-16 NOTE — PROGRESS NOTES
disorder, depressed ([facility]     Post traumatic stress disorder     Primary insomnia     Past Medical History:   Diagnosis Date    Bell's palsy     Bipolar 1 disorder, depressed ([facility] 2018    GERD (gastroesophageal reflux disease)     HTN (hypertension)     Iron (Fe) deficiency anemia     Obesity     Post-nasal drip     Seasonal allergies       Past Surgical History:   Procedure Laterality Date    BREAST REDUCTION SURGERY       SECTION      Twice     Family History   Problem Relation Age of Onset    High Blood Pressure Mother     Coronary Art Dis Mother     Thyroid Disease Mother     Depression Mother     High Blood Pressure Father     Thyroid Disease Father     Depression Father      Social History     Tobacco Use    Smoking status: Former Smoker     Last attempt to quit: 1995     Years since quittin.7    Smokeless tobacco: Never Used   Substance Use Topics    Alcohol use: Yes     Comment: occational wine     ALLERGIES:    Allergies   Allergen Reactions    Celebrex [Celecoxib] Shortness Of Breath and Swelling    Pcn [Penicillins]      c/o yeast infection           Subjective     · Constitutional:  Negative for activity change, appetite change,unexpected weight change, chills, fever, and fatigue. · HENT: Negative for ear pain, sore throat,  Rhinorrhea, sinus pain, sinus pressure, congestion. Positive for left mouth drooping  · Eyes:  Negative for pain and discharge. Positive for left eye drooping  · Respiratory:  Negative for chest tightness, shortness of breath, wheezing, and cough. · Cardiovascular:  Negative for chest pain, palpitations and leg swelling. · Gastrointestinal: Negative for abdominal pain, blood in stool, constipation,diarrhea, nausea and vomiting. · Endocrine: Negative for cold intolerance, heat intolerance, polydipsia, polyphagia and polyuria.    · Genitourinary: Negative for difficulty urinating, dysuria, flank pain, frequency, hematuria and full ROM, equal and strong hand grasp  · Lymphadenopathy: No lymphadenopathy noted. · Neurological: Alert and oriented to person, place, and time. Normal motor function, Normal sensory function, No focal deficits noted. He has normal strength. · Skin: Skin is warm, dry and intact. No obvious lesions on exposed skin. Forehead, bilateral cheeks - many white comedones, no erythema, no swelling   · Psychiatric: Normal mood and affect. Speech is normal and behavior is normal.     Nursing note and vitals reviewed. Blood pressure 124/78, pulse 82, temperature 97.5 °F (36.4 °C), temperature source Temporal, weight 159 lb (72.1 kg), SpO2 94 %, not currently breastfeeding. Body mass index is 31.05 kg/m². Wt Readings from Last 3 Encounters:   09/16/19 159 lb (72.1 kg)   02/15/19 165 lb 9.6 oz (75.1 kg)   08/15/18 163 lb 3.2 oz (74 kg)     BP Readings from Last 3 Encounters:   09/16/19 124/78   02/15/19 118/80   08/15/18 116/72       No results found for this visit on 09/16/19. Completed Orders/Prescriptions   Orders Placed This Encounter   Medications    predniSONE (DELTASONE) 10 MG tablet     Sig: Take 1 tablet by mouth 3 times daily (with meals) for 5 days     Dispense:  15 tablet     Refill:  0    Adapalene-Benzoyl Peroxide 0.1-2.5 % GEL     Sig: Apply to face daily     Dispense:  1 Tube     Refill:  6    doxycycline hyclate (VIBRA-TABS) 100 MG tablet     Sig: Take 1 tablet by mouth 2 times daily     Dispense:  90 tablet     Refill:  0               AssessmentPlan/Medical Decision Making     1. Right arm pain  - [facility] [place]  - predniSONE (DELTASONE) 10 MG tablet; Take 1 tablet by mouth 3 times daily (with meals) for 5 days  Dispense: 15 tablet; Refill: 0    2. H/O Bell's palsy  - this is a 20 year problem - no acute findings  - [facility] [place]    3. Facial droop  - this is a 20 year problem - no acute findings  - [facility] [place]    4.  Acne

## 2019-09-18 DIAGNOSIS — M79.601 RIGHT ARM PAIN: Primary | ICD-10-CM

## 2019-09-25 ENCOUNTER — TELEPHONE (OUTPATIENT)
Dept: FAMILY MEDICINE CLINIC | Age: 44
End: 2019-09-25

## 2019-10-04 ENCOUNTER — HOSPITAL ENCOUNTER (OUTPATIENT)
Dept: PHYSICAL THERAPY | Facility: CLINIC | Age: 44
Setting detail: THERAPIES SERIES
Discharge: HOME | End: 2019-10-04
Payer: MEDICARE

## 2019-10-04 PROCEDURE — 97140 MANUAL THERAPY 1/> REGIONS: CPT

## 2019-10-04 PROCEDURE — 97161 PT EVAL LOW COMPLEX 20 MIN: CPT

## 2019-10-04 PROCEDURE — 97110 THERAPEUTIC EXERCISES: CPT

## 2019-10-07 ENCOUNTER — TELEPHONE (OUTPATIENT)
Dept: FAMILY MEDICINE CLINIC | Age: 44
End: 2019-10-07

## 2019-10-07 RX ORDER — FLUCONAZOLE 150 MG/1
150 TABLET ORAL ONCE
Qty: 1 TABLET | Refills: 0 | Status: SHIPPED | OUTPATIENT
Start: 2019-10-07 | End: 2019-10-07

## 2019-10-11 ENCOUNTER — INITIAL CONSULT (OUTPATIENT)
Dept: PAIN MANAGEMENT | Age: 44
End: 2019-10-11
Payer: MEDICARE

## 2019-10-11 ENCOUNTER — HOSPITAL ENCOUNTER (OUTPATIENT)
Age: 44
Setting detail: SPECIMEN
Discharge: HOME | End: 2019-10-11
Payer: MEDICARE

## 2019-10-11 VITALS
HEIGHT: 61 IN | WEIGHT: 154.4 LBS | HEART RATE: 68 BPM | DIASTOLIC BLOOD PRESSURE: 88 MMHG | SYSTOLIC BLOOD PRESSURE: 127 MMHG | BODY MASS INDEX: 29.15 KG/M2

## 2019-10-11 DIAGNOSIS — M54.12 CERVICAL RADICULOPATHY: Primary | ICD-10-CM

## 2019-10-11 DIAGNOSIS — Z79.891 ENCOUNTER FOR LONG-TERM OPIATE ANALGESIC USE: ICD-10-CM

## 2019-10-11 DIAGNOSIS — R10.2 PELVIC PAIN: ICD-10-CM

## 2019-10-11 DIAGNOSIS — M47.817 LUMBOSACRAL SPONDYLOSIS WITHOUT MYELOPATHY: ICD-10-CM

## 2019-10-11 DIAGNOSIS — M25.551 PAIN OF RIGHT HIP JOINT: ICD-10-CM

## 2019-10-11 PROCEDURE — 99204 OFFICE O/P NEW MOD 45 MIN: CPT | Performed by: PAIN MEDICINE

## 2019-10-11 RX ORDER — BUSPIRONE HYDROCHLORIDE 15 MG/1
TABLET ORAL
Refills: 0 | COMMUNITY
Start: 2019-09-21

## 2019-10-11 RX ORDER — FLUCONAZOLE 150 MG/1
TABLET ORAL
Refills: 0 | COMMUNITY
Start: 2019-10-07 | End: 2021-06-08 | Stop reason: ALTCHOICE

## 2019-10-11 RX ORDER — BENZTROPINE MESYLATE 2 MG/1
TABLET ORAL
Refills: 0 | COMMUNITY
Start: 2019-09-21

## 2019-10-11 RX ORDER — ARIPIPRAZOLE 20 MG/1
TABLET ORAL
Refills: 0 | COMMUNITY
Start: 2019-09-21

## 2019-10-11 ASSESSMENT — ENCOUNTER SYMPTOMS
COUGH: 0
NAUSEA: 0
BLURRED VISION: 0

## 2019-10-14 ENCOUNTER — HOSPITAL ENCOUNTER (OUTPATIENT)
Dept: PHYSICAL THERAPY | Facility: CLINIC | Age: 44
Setting detail: THERAPIES SERIES
Discharge: HOME | End: 2019-10-14
Payer: MEDICARE

## 2019-10-14 PROCEDURE — 97140 MANUAL THERAPY 1/> REGIONS: CPT

## 2019-10-14 PROCEDURE — 97110 THERAPEUTIC EXERCISES: CPT

## 2019-10-16 ENCOUNTER — HOSPITAL ENCOUNTER (OUTPATIENT)
Dept: PHYSICAL THERAPY | Facility: CLINIC | Age: 44
Setting detail: THERAPIES SERIES
Discharge: HOME | End: 2019-10-16
Payer: MEDICARE

## 2019-10-16 LAB
6-ACETYLMORPHINE, UR: NOT DETECTED
7-AMINOCLONAZEPAM, URINE: NOT DETECTED
ALPHA-OH-ALPRAZ, URINE: NOT DETECTED
ALPRAZOLAM, URINE: NOT DETECTED
AMPHETAMINES, URINE: NOT DETECTED
BARBITURATES, URINE: NOT DETECTED
BENZOYLECGONINE, UR: NOT DETECTED
BUPRENORPHINE URINE: NOT DETECTED
CARISOPRODOL, UR: NOT DETECTED
CLONAZEPAM, URINE: NOT DETECTED
CODEINE, URINE: NOT DETECTED
CREATININE URINE: 118.4 MG/DL (ref 20–400)
DIAZEPAM, URINE: NOT DETECTED
DRUGS EXPECTED, UR: NORMAL
EER HI RES INTERP UR: NORMAL
ETHYL GLUCURONIDE UR: NOT DETECTED
FENTANYL URINE: NOT DETECTED
HYDROCODONE, URINE: NOT DETECTED
HYDROMORPHONE, URINE: NOT DETECTED
LORAZEPAM, URINE: NOT DETECTED
MARIJUANA METAB, UR: PRESENT
MDA, UR: NOT DETECTED
MDEA, EVE, UR: NOT DETECTED
MDMA URINE: NOT DETECTED
MEPERIDINE METAB, UR: NOT DETECTED
METHADONE, URINE: NOT DETECTED
METHAMPHETAMINE, URINE: NOT DETECTED
METHYLPHENIDATE: NOT DETECTED
MIDAZOLAM, URINE: NOT DETECTED
MORPHINE URINE: NOT DETECTED
NORBUPRENORPHINE, URINE: NOT DETECTED
NORDIAZEPAM, URINE: NOT DETECTED
NORFENTANYL, URINE: NOT DETECTED
NORHYDROCODONE, URINE: NOT DETECTED
NOROXYCODONE, URINE: NOT DETECTED
NOROXYMORPHONE, URINE: NOT DETECTED
OXAZEPAM, URINE: NOT DETECTED
OXYCODONE URINE: NOT DETECTED
OXYMORPHONE, URINE: NOT DETECTED
PAIN MANAGEMENT DRUG PANEL INTERP, URINE: NORMAL
PAIN MGT DRUG PANEL, HI RES, UR: NORMAL
PCP,URINE: NOT DETECTED
PHENTERMINE, UR: NOT DETECTED
PROPOXYPHENE, URINE: NOT DETECTED
TAPENTADOL, URINE: NOT DETECTED
TAPENTADOL-O-SULFATE, URINE: NOT DETECTED
TEMAZEPAM, URINE: NOT DETECTED
TRAMADOL, URINE: NOT DETECTED
ZOLPIDEM, URINE: NOT DETECTED

## 2019-10-16 PROCEDURE — 97110 THERAPEUTIC EXERCISES: CPT

## 2019-10-16 PROCEDURE — 97140 MANUAL THERAPY 1/> REGIONS: CPT

## 2019-10-18 ENCOUNTER — HOSPITAL ENCOUNTER (OUTPATIENT)
Dept: MRI IMAGING | Facility: CLINIC | Age: 44
Discharge: HOME | End: 2019-10-20
Payer: MEDICARE

## 2019-10-18 DIAGNOSIS — M47.817 LUMBOSACRAL SPONDYLOSIS WITHOUT MYELOPATHY: ICD-10-CM

## 2019-10-18 DIAGNOSIS — M54.12 CERVICAL RADICULOPATHY: ICD-10-CM

## 2019-10-18 PROCEDURE — 72148 MRI LUMBAR SPINE W/O DYE: CPT

## 2019-10-18 PROCEDURE — 72141 MRI NECK SPINE W/O DYE: CPT

## 2019-10-21 ENCOUNTER — HOSPITAL ENCOUNTER (OUTPATIENT)
Dept: PHYSICAL THERAPY | Facility: CLINIC | Age: 44
Setting detail: THERAPIES SERIES
Discharge: HOME | End: 2019-10-21
Payer: MEDICARE

## 2019-10-23 ENCOUNTER — TELEPHONE (OUTPATIENT)
Dept: PAIN MANAGEMENT | Age: 44
End: 2019-10-23

## 2019-10-24 ENCOUNTER — HOSPITAL ENCOUNTER (OUTPATIENT)
Age: 44
Discharge: HOME | End: 2019-10-26
Payer: MEDICARE

## 2019-10-24 ENCOUNTER — HOSPITAL ENCOUNTER (OUTPATIENT)
Dept: PHYSICAL THERAPY | Facility: CLINIC | Age: 44
Setting detail: THERAPIES SERIES
Discharge: HOME | End: 2019-10-24
Payer: MEDICARE

## 2019-10-24 ENCOUNTER — HOSPITAL ENCOUNTER (OUTPATIENT)
Dept: GENERAL RADIOLOGY | Age: 44
Discharge: HOME | End: 2019-10-26
Payer: MEDICARE

## 2019-10-24 DIAGNOSIS — M25.551 PAIN OF RIGHT HIP JOINT: ICD-10-CM

## 2019-10-24 PROCEDURE — 97140 MANUAL THERAPY 1/> REGIONS: CPT

## 2019-10-24 PROCEDURE — 97110 THERAPEUTIC EXERCISES: CPT

## 2019-10-24 PROCEDURE — 73502 X-RAY EXAM HIP UNI 2-3 VIEWS: CPT

## 2019-10-29 ENCOUNTER — HOSPITAL ENCOUNTER (OUTPATIENT)
Dept: PHYSICAL THERAPY | Facility: CLINIC | Age: 44
Setting detail: THERAPIES SERIES
Discharge: HOME | End: 2019-10-29
Payer: MEDICARE

## 2019-10-29 PROCEDURE — 97140 MANUAL THERAPY 1/> REGIONS: CPT

## 2019-10-29 PROCEDURE — 97110 THERAPEUTIC EXERCISES: CPT

## 2019-10-31 ENCOUNTER — HOSPITAL ENCOUNTER (OUTPATIENT)
Dept: PHYSICAL THERAPY | Facility: CLINIC | Age: 44
Setting detail: THERAPIES SERIES
Discharge: HOME | End: 2019-10-31
Payer: MEDICARE

## 2019-10-31 ENCOUNTER — APPOINTMENT (OUTPATIENT)
Dept: PHYSICAL THERAPY | Facility: CLINIC | Age: 44
End: 2019-10-31
Payer: MEDICARE

## 2019-10-31 PROCEDURE — 97110 THERAPEUTIC EXERCISES: CPT

## 2019-10-31 PROCEDURE — 97140 MANUAL THERAPY 1/> REGIONS: CPT

## 2019-11-05 ENCOUNTER — HOSPITAL ENCOUNTER (OUTPATIENT)
Dept: PHYSICAL THERAPY | Facility: CLINIC | Age: 44
Setting detail: THERAPIES SERIES
Discharge: HOME | End: 2019-11-05
Payer: MEDICARE

## 2019-11-06 ENCOUNTER — APPOINTMENT (OUTPATIENT)
Dept: PHYSICAL THERAPY | Facility: CLINIC | Age: 44
End: 2019-11-06
Payer: MEDICARE

## 2019-11-08 ENCOUNTER — HOSPITAL ENCOUNTER (OUTPATIENT)
Dept: PHYSICAL THERAPY | Facility: CLINIC | Age: 44
Setting detail: THERAPIES SERIES
Discharge: HOME | End: 2019-11-08
Payer: MEDICARE

## 2019-11-08 ENCOUNTER — APPOINTMENT (OUTPATIENT)
Dept: PHYSICAL THERAPY | Facility: CLINIC | Age: 44
End: 2019-11-08
Payer: MEDICARE

## 2019-11-08 PROCEDURE — 97140 MANUAL THERAPY 1/> REGIONS: CPT

## 2019-11-08 PROCEDURE — 97110 THERAPEUTIC EXERCISES: CPT

## 2019-11-12 ENCOUNTER — HOSPITAL ENCOUNTER (OUTPATIENT)
Dept: PHYSICAL THERAPY | Facility: CLINIC | Age: 44
Setting detail: THERAPIES SERIES
Discharge: HOME | End: 2019-11-12
Payer: MEDICARE

## 2019-11-12 PROCEDURE — 97110 THERAPEUTIC EXERCISES: CPT

## 2019-11-14 ENCOUNTER — HOSPITAL ENCOUNTER (OUTPATIENT)
Dept: PHYSICAL THERAPY | Facility: CLINIC | Age: 44
Setting detail: THERAPIES SERIES
Discharge: HOME | End: 2019-11-14
Payer: MEDICARE

## 2019-11-15 ENCOUNTER — OFFICE VISIT (OUTPATIENT)
Dept: PAIN MANAGEMENT | Age: 44
End: 2019-11-15
Payer: MEDICARE

## 2019-11-15 VITALS
BODY MASS INDEX: 27.75 KG/M2 | HEART RATE: 69 BPM | SYSTOLIC BLOOD PRESSURE: 129 MMHG | OXYGEN SATURATION: 98 % | DIASTOLIC BLOOD PRESSURE: 82 MMHG | HEIGHT: 61 IN | WEIGHT: 147 LBS

## 2019-11-15 DIAGNOSIS — G89.29 OTHER CHRONIC PAIN: Primary | ICD-10-CM

## 2019-11-15 DIAGNOSIS — M47.812 CERVICAL SPONDYLOSIS: ICD-10-CM

## 2019-11-15 DIAGNOSIS — M25.551 PAIN OF RIGHT HIP JOINT: ICD-10-CM

## 2019-11-15 PROCEDURE — 99214 OFFICE O/P EST MOD 30 MIN: CPT | Performed by: PAIN MEDICINE

## 2019-11-15 ASSESSMENT — ENCOUNTER SYMPTOMS: CONSTIPATION: 1

## 2019-12-03 ENCOUNTER — HOSPITAL ENCOUNTER (OUTPATIENT)
Age: 44
Setting detail: SPECIMEN
Discharge: HOME | End: 2019-12-03
Payer: MEDICARE

## 2019-12-03 ENCOUNTER — OFFICE VISIT (OUTPATIENT)
Dept: OBGYN CLINIC | Age: 44
End: 2019-12-03
Payer: MEDICARE

## 2019-12-03 VITALS
RESPIRATION RATE: 16 BRPM | SYSTOLIC BLOOD PRESSURE: 112 MMHG | DIASTOLIC BLOOD PRESSURE: 64 MMHG | WEIGHT: 151 LBS | BODY MASS INDEX: 29.64 KG/M2 | HEIGHT: 60 IN

## 2019-12-03 DIAGNOSIS — Z91.89 ENCOUNTER FOR GYNECOLOGIC EXAMINATION FOR HIGH-RISK PATIENT COVERED BY MEDICARE: ICD-10-CM

## 2019-12-03 DIAGNOSIS — N89.8 VAGINAL DRYNESS: ICD-10-CM

## 2019-12-03 DIAGNOSIS — R10.2 PELVIC AND PERINEAL PAIN: ICD-10-CM

## 2019-12-03 DIAGNOSIS — N92.6 IRREGULAR MENSTRUAL BLEEDING: ICD-10-CM

## 2019-12-03 DIAGNOSIS — N89.8 VAGINAL ODOR: ICD-10-CM

## 2019-12-03 DIAGNOSIS — N89.8 VAGINAL DISCHARGE: ICD-10-CM

## 2019-12-03 DIAGNOSIS — Z12.31 ENCOUNTER FOR SCREENING MAMMOGRAM FOR MALIGNANT NEOPLASM OF BREAST: ICD-10-CM

## 2019-12-03 DIAGNOSIS — R10.2 PELVIC PAIN: Primary | ICD-10-CM

## 2019-12-03 DIAGNOSIS — Z12.39 BREAST CANCER SCREENING: ICD-10-CM

## 2019-12-03 PROCEDURE — 99214 OFFICE O/P EST MOD 30 MIN: CPT | Performed by: NURSE PRACTITIONER

## 2019-12-03 PROCEDURE — G8427 DOCREV CUR MEDS BY ELIG CLIN: HCPCS | Performed by: NURSE PRACTITIONER

## 2019-12-03 PROCEDURE — 1036F TOBACCO NON-USER: CPT | Performed by: NURSE PRACTITIONER

## 2019-12-03 PROCEDURE — G8417 CALC BMI ABV UP PARAM F/U: HCPCS | Performed by: NURSE PRACTITIONER

## 2019-12-03 PROCEDURE — G8484 FLU IMMUNIZE NO ADMIN: HCPCS | Performed by: NURSE PRACTITIONER

## 2019-12-03 ASSESSMENT — ENCOUNTER SYMPTOMS
SHORTNESS OF BREATH: 0
COLOR CHANGE: 0
RHINORRHEA: 0
NAUSEA: 0
ABDOMINAL PAIN: 0
VOMITING: 0
CONSTIPATION: 1
BACK PAIN: 0
DIARRHEA: 0
COUGH: 0

## 2019-12-04 DIAGNOSIS — N76.0 BV (BACTERIAL VAGINOSIS): Primary | ICD-10-CM

## 2019-12-04 DIAGNOSIS — N89.8 VAGINAL DRYNESS: ICD-10-CM

## 2019-12-04 DIAGNOSIS — N89.8 VAGINAL ODOR: ICD-10-CM

## 2019-12-04 DIAGNOSIS — N89.8 VAGINAL DISCHARGE: ICD-10-CM

## 2019-12-04 DIAGNOSIS — B96.89 BV (BACTERIAL VAGINOSIS): Primary | ICD-10-CM

## 2019-12-04 DIAGNOSIS — N92.6 IRREGULAR MENSTRUAL BLEEDING: ICD-10-CM

## 2019-12-04 LAB
DIRECT EXAM: ABNORMAL
Lab: ABNORMAL
SPECIMEN DESCRIPTION: ABNORMAL

## 2019-12-04 RX ORDER — METRONIDAZOLE 500 MG/1
500 TABLET ORAL 2 TIMES DAILY
Qty: 14 TABLET | Refills: 0 | Status: SHIPPED | OUTPATIENT
Start: 2019-12-04 | End: 2019-12-11

## 2019-12-06 LAB
CHLAMYDIA BY THIN PREP: NEGATIVE
HPV SAMPLE: NORMAL
HPV, GENOTYPE 16: NOT DETECTED
HPV, GENOTYPE 18: NOT DETECTED
HPV, HIGH RISK OTHER: NOT DETECTED
HPV, INTERPRETATION: NORMAL
N. GONORRHOEAE DNA, THIN PREP: NEGATIVE
SPECIMEN DESCRIPTION: NORMAL
SPECIMEN DESCRIPTION: NORMAL

## 2019-12-10 LAB — CYTOLOGY REPORT: NORMAL

## 2019-12-16 ENCOUNTER — TELEPHONE (OUTPATIENT)
Dept: OBGYN CLINIC | Age: 44
End: 2019-12-16

## 2019-12-16 RX ORDER — FLUCONAZOLE 150 MG/1
150 TABLET ORAL ONCE
Qty: 1 TABLET | Refills: 0 | Status: SHIPPED | OUTPATIENT
Start: 2019-12-16 | End: 2019-12-16

## 2019-12-20 ENCOUNTER — HOSPITAL ENCOUNTER (OUTPATIENT)
Dept: PHYSICAL THERAPY | Facility: CLINIC | Age: 44
Setting detail: THERAPIES SERIES
Discharge: HOME | End: 2019-12-20
Payer: MEDICARE

## 2020-03-13 ENCOUNTER — TELEPHONE (OUTPATIENT)
Dept: FAMILY MEDICINE CLINIC | Age: 45
End: 2020-03-13

## 2020-03-13 RX ORDER — FLUTICASONE PROPIONATE 50 MCG
1 SPRAY, SUSPENSION (ML) NASAL DAILY
Qty: 2 BOTTLE | Refills: 1 | Status: SHIPPED | OUTPATIENT
Start: 2020-03-13 | End: 2020-03-26 | Stop reason: SDUPTHER

## 2020-03-13 RX ORDER — CETIRIZINE HYDROCHLORIDE 10 MG/1
10 TABLET ORAL DAILY
Qty: 90 TABLET | Refills: 1 | Status: SHIPPED | OUTPATIENT
Start: 2020-03-13 | End: 2020-10-13

## 2020-03-13 NOTE — TELEPHONE ENCOUNTER
She has a sinus infection symptoms are drainage, itch in the throat, cough.  Can you send her a antibiotic or something into [facility]    She does have an appt with you May 1st

## 2020-03-13 NOTE — TELEPHONE ENCOUNTER
Zyrtec and flonase sent. Difficult to prescribe abx over phone without evaluation. Would recommend UC if symptoms worsen.     Satya Kapadia DO

## 2020-03-26 RX ORDER — FLUTICASONE PROPIONATE 50 MCG
1 SPRAY, SUSPENSION (ML) NASAL DAILY
Qty: 2 BOTTLE | Refills: 1 | Status: SHIPPED | OUTPATIENT
Start: 2020-03-26 | End: 2021-01-19

## 2020-03-26 NOTE — TELEPHONE ENCOUNTER
Next Visit Date:  Future Appointments   Date Time Provider Elbert Ariza   2020  4:40 PM Bella Stratton  [address] [facility] Maintenance   Topic Date Due    HIV screen  1988    DTaP/Tdap/Td vaccine (1 - Tdap) 1992    Potassium monitoring  2018    Creatinine monitoring  2018    Annual Wellness Visit (AWV)  2019    Flu vaccine (1) 2020 (Originally 2019)    Diabetes screen  2020    Cervical cancer screen  2020    Lipid screen  2022    Hepatitis A vaccine  Aged Out    Hepatitis B vaccine  Aged Out    Hib vaccine  Aged Out    Meningococcal (ACWY) vaccine  Aged Out    Pneumococcal 0-64 years Vaccine  Aged Out       No results found for: LABA1C          ( goal A1C is < 7)   No results found for: LABMICR  LDL Cholesterol (mg/dL)   Date Value   2017 77   2016 74       (goal LDL is <100)   AST (U/L)   Date Value   2017 17     ALT (U/L)   Date Value   2017 18     BUN (mg/dL)   Date Value   2017 10     BP Readings from Last 3 Encounters:   19 112/64   11/15/19 129/82   10/11/19 127/88          (goal 120/80)    All Future Testing planned in [facility]  Lab Frequency Next Occurrence   US Pelvis Complete Once 2019   US OB Transvaginal Once 2019   PAP Smear Once 2019   CBC Auto Differential Once 2019   TSH with Reflex Once 2019   US Non OB Transvaginal Once 12/10/2019   Prolactin Once 6770   Follicle Stimulating Hormone Once    Basic Metabolic Panel Once    AURELIA DIGITAL SCREEN W CAD BILATERAL Once 2020               Patient Active Problem List:     Seasonal allergies     HTN (hypertension)     Iron (Fe) deficiency anemia     Obesity     GERD (gastroesophageal reflux disease)     Bell's palsy     Post-nasal drip     Nasal obstruction     Nasal congestion     Raynaud's phenomenon without gangrene     History of low transverse  section

## 2020-04-15 ENCOUNTER — NURSE TRIAGE (OUTPATIENT)
Dept: OTHER | Facility: CLINIC | Age: 45
End: 2020-04-15

## 2020-04-15 NOTE — TELEPHONE ENCOUNTER
[facility] Reason for Disposition   Continuous (nonstop) coughing interferes with work or school and no improvement using cough treatment per Care Advice    Protocols used: COUGH-ADULT-OH    Pt is calling with c/o cough, fever, and sob. Pt states that she has had these symptoms for the last month. Pt states that she does not have a thermometer, however she has had symptoms of a fever including chills. Pt states that her sob occurs at night when she is trying to sleep. Pt denies any sob currently. Pt states that she has taken Mucinex with no relief of symptoms. Recommended that pt see PCP today or tomorrow. Provided pt with care advice. Call soft transferred to [address] to schedule appointment. Please do not reply to the triage nurse through this encounter. Any subsequent communication should be directly with the patient.

## 2020-04-16 ENCOUNTER — TELEMEDICINE (OUTPATIENT)
Dept: FAMILY MEDICINE CLINIC | Age: 45
End: 2020-04-16
Payer: MEDICARE

## 2020-04-16 PROCEDURE — 99213 OFFICE O/P EST LOW 20 MIN: CPT | Performed by: FAMILY MEDICINE

## 2020-04-16 PROCEDURE — G8427 DOCREV CUR MEDS BY ELIG CLIN: HCPCS | Performed by: FAMILY MEDICINE

## 2020-04-16 RX ORDER — LORATADINE 10 MG/1
10 TABLET ORAL DAILY
Qty: 90 TABLET | Refills: 0 | Status: SHIPPED | OUTPATIENT
Start: 2020-04-16 | End: 2021-04-20

## 2020-04-16 RX ORDER — PREDNISONE 20 MG/1
20 TABLET ORAL 2 TIMES DAILY
Qty: 10 TABLET | Refills: 0 | Status: SHIPPED | OUTPATIENT
Start: 2020-04-16 | End: 2020-04-21

## 2020-04-16 RX ORDER — DOXYCYCLINE HYCLATE 100 MG
100 TABLET ORAL 2 TIMES DAILY
Qty: 14 TABLET | Refills: 0 | Status: SHIPPED | OUTPATIENT
Start: 2020-04-16 | End: 2020-04-23

## 2020-04-16 NOTE — PROGRESS NOTES
Cardiovascular: No chest pain, palpitations or shortness of breath  Gastrointestinal: No abdominal pain or change in bowel movements. Genitourinary: No change in urinary frequency or dysuria. Skin: No rashes or skin lesions. Neurological: No weakness. No headaches. Last Filed Vitals: There were no vitals taken for this visit. Physical Examination:     GENERAL APPEARANCE: in no acute distress, well developed, well nourished. HEAD: normocephalic, atraumatic. EYES: extraocular movement intact (EOMI), pupils equal, round  EARS: normal, symmetric height   NOSE: nares patent,  + rhinorrhea. ORAL CAVITY: mucosa moist, no lesions. NECK/THYROID: full range of motion, no gross thyromegaly. Recent Labs/ In Office Testing/ Radiograph review:     Hospital Outpatient Visit on 12/03/2019   Component Date Value Ref Range Status    Specimen Description 12/03/2019 . CERVIX   Final    Chlamydia By Thin Prep 12/03/2019 NEGATIVE  NEGATIVE Final    Comment: CHLAMYDIA TRACHOMATIS DNA not detected by nucleic acid amplification. This test is intended for medical purposes only and is not valid for the evaluation of   suspected sexual abuse or for other forensic purposes. In certain contexts, culture may be required to meet applicable laws and regulations for   diagnosis of C. trachomatis and N. gonorrhoeae infections. Per 2014  CDC recommendations, this test does not include confirmation of positive results   by an alternative nucleic acid target.  N. gonorrhoeae DNA, Thin Prep 12/03/2019 NEGATIVE  NEGATIVE Final    Comment: NEISSERIA GONORRHOEAE DNA not detected by nucleic acid amplification. This test is intended for medical purposes only and is not valid for the evaluation of   suspected sexual abuse or for other forensic purposes. In certain contexts, culture may be required to meet applicable laws and regulations for   diagnosis of C. trachomatis and N. gonorrhoeae infections.   Per patient's risk of exposure to COVID-19 and provide necessary medical care. The patient (and/or legal guardian) has also been advised to contact this office for worsening conditions or problems, and seek emergency medical treatment and/or call 911 if deemed necessary. Services were provided through a video synchronous discussion virtually to substitute for in-person clinic visit. Patient and provider were located at their individual homes. --Leoncio Tamayo DO on 4/16/2020 at 10:30 AM    An electronic signature was used to authenticate this note.

## 2020-05-04 RX ORDER — ADAPALENE AND BENZOYL PEROXIDE .1; 2.5 G/100G; G/100G
GEL TOPICAL
Qty: 45 G | Refills: 2 | Status: SHIPPED | OUTPATIENT
Start: 2020-05-04 | End: 2020-10-13 | Stop reason: SDUPTHER

## 2020-05-04 RX ORDER — CARVEDILOL 12.5 MG/1
TABLET ORAL
Qty: 180 TABLET | Refills: 1 | Status: SHIPPED | OUTPATIENT
Start: 2020-05-04 | End: 2021-06-08 | Stop reason: ALTCHOICE

## 2020-05-04 NOTE — TELEPHONE ENCOUNTER
Next Visit Date:  Future Appointments   Date Time Provider Elbert Ariza   7/16/2020  3:20 PM Renny Candelario, [address] [facility] Maintenance   Topic Date Due    HIV screen  05/21/1988    DTaP/Tdap/Td vaccine (1 - Tdap) 05/21/1992    Potassium monitoring  06/16/2018    Creatinine monitoring  06/16/2018    Annual Wellness Visit (AWV)  05/29/2019    Flu vaccine (Season Ended) 12/01/2020 (Originally 9/1/2020)    Diabetes screen  06/16/2020    Cervical cancer screen  12/03/2020    Lipid screen  06/16/2022    Hepatitis A vaccine  Aged Out    Hepatitis B vaccine  Aged Out    Hib vaccine  Aged Out    Meningococcal (ACWY) vaccine  Aged Out    Pneumococcal 0-64 years Vaccine  Aged Out       No results found for: LABA1C          ( goal A1C is < 7)   No results found for: LABMICR  LDL Cholesterol (mg/dL)   Date Value   06/16/2017 77   03/30/2016 74       (goal LDL is <100)   AST (U/L)   Date Value   06/16/2017 17     ALT (U/L)   Date Value   06/16/2017 18     BUN (mg/dL)   Date Value   06/16/2017 10     BP Readings from Last 3 Encounters:   12/03/19 112/64   11/15/19 129/82   10/11/19 127/88          (goal 120/80)    All Future Testing planned in [facility]  Lab Frequency Next Occurrence   US Pelvis Complete Once 12/13/2019   US OB Transvaginal Once 12/03/2019   PAP Smear Once 12/03/2019   CBC Auto Differential Once 12/17/2019   TSH with Reflex Once 12/03/2019   US Non OB Transvaginal Once 12/10/2019   Prolactin Once 05/17/0311   Follicle Stimulating Hormone Once 58/80/8038   Basic Metabolic Panel Once 00/70/3174   AURELIA DIGITAL SCREEN W CAD BILATERAL Once 01/02/2020               Patient Active Problem List:     Seasonal allergies     HTN (hypertension)     Iron (Fe) deficiency anemia     Obesity     GERD (gastroesophageal reflux disease)     Bell's palsy     Post-nasal drip     Nasal obstruction     Nasal congestion     Raynaud's phenomenon without gangrene     History of low transverse

## 2020-05-18 ENCOUNTER — TELEPHONE (OUTPATIENT)
Dept: FAMILY MEDICINE CLINIC | Age: 45
End: 2020-05-18

## 2020-05-18 RX ORDER — FLUCONAZOLE 150 MG/1
150 TABLET ORAL
Qty: 2 TABLET | Refills: 0 | Status: SHIPPED | OUTPATIENT
Start: 2020-05-18 | End: 2020-05-24

## 2020-05-18 NOTE — TELEPHONE ENCOUNTER
Pt says she still has the yeast infection. She wants to know if she can get another round of antibiotic for it?

## 2020-06-16 ENCOUNTER — TELEPHONE (OUTPATIENT)
Dept: FAMILY MEDICINE CLINIC | Age: 45
End: 2020-06-16
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
## 2020-07-16 ENCOUNTER — OFFICE VISIT (OUTPATIENT)
Dept: FAMILY MEDICINE CLINIC | Age: 45
End: 2020-07-16
Payer: MEDICARE

## 2020-07-16 VITALS
RESPIRATION RATE: 16 BRPM | BODY MASS INDEX: 32 KG/M2 | DIASTOLIC BLOOD PRESSURE: 89 MMHG | TEMPERATURE: 98.2 F | OXYGEN SATURATION: 98 % | SYSTOLIC BLOOD PRESSURE: 139 MMHG | WEIGHT: 163 LBS | HEIGHT: 60 IN | HEART RATE: 72 BPM

## 2020-07-16 PROCEDURE — G0439 PPPS, SUBSEQ VISIT: HCPCS | Performed by: FAMILY MEDICINE

## 2020-07-16 PROCEDURE — G0444 DEPRESSION SCREEN ANNUAL: HCPCS | Performed by: FAMILY MEDICINE

## 2020-07-16 PROCEDURE — G8417 CALC BMI ABV UP PARAM F/U: HCPCS | Performed by: FAMILY MEDICINE

## 2020-07-16 PROCEDURE — 99214 OFFICE O/P EST MOD 30 MIN: CPT | Performed by: FAMILY MEDICINE

## 2020-07-16 RX ORDER — BLOOD PRESSURE TEST KIT
KIT MISCELLANEOUS
Qty: 1 KIT | Refills: 0 | Status: SHIPPED | OUTPATIENT
Start: 2020-07-16 | End: 2021-04-20

## 2020-07-16 RX ORDER — IBUPROFEN 600 MG/1
600 TABLET ORAL 3 TIMES DAILY PRN
Qty: 270 TABLET | Refills: 1 | Status: SHIPPED | OUTPATIENT
Start: 2020-07-16 | End: 2021-06-08 | Stop reason: ALTCHOICE

## 2020-07-16 RX ORDER — METHOCARBAMOL 500 MG/1
500 TABLET, FILM COATED ORAL 4 TIMES DAILY
Qty: 120 TABLET | Refills: 0 | Status: SHIPPED | OUTPATIENT
Start: 2020-07-16 | End: 2020-08-15

## 2020-07-16 RX ORDER — SULFAMETHOXAZOLE AND TRIMETHOPRIM 800; 160 MG/1; MG/1
1 TABLET ORAL 2 TIMES DAILY
Qty: 14 TABLET | Refills: 0 | Status: SHIPPED | OUTPATIENT
Start: 2020-07-16 | End: 2020-07-23

## 2020-07-16 RX ORDER — ACETAMINOPHEN 500 MG
1000 TABLET ORAL 3 TIMES DAILY
Qty: 540 TABLET | Refills: 1 | Status: SHIPPED | OUTPATIENT
Start: 2020-07-16 | End: 2021-06-08 | Stop reason: ALTCHOICE

## 2020-07-16 ASSESSMENT — LIFESTYLE VARIABLES: HOW OFTEN DO YOU HAVE A DRINK CONTAINING ALCOHOL: 0

## 2020-07-16 ASSESSMENT — PATIENT HEALTH QUESTIONNAIRE - PHQ9
SUM OF ALL RESPONSES TO PHQ QUESTIONS 1-9: 0
SUM OF ALL RESPONSES TO PHQ QUESTIONS 1-9: 0

## 2020-07-16 NOTE — PROGRESS NOTES
Medicare Annual Wellness Visit  Name: Lorenzo Cardona Date: 2020   MRN: [id number] Sex: Female   Age: 50 y.o. Ethnicity: Non-/Non    : 1975 Race: Black      Lara Carlos is here for Medicare AWV; Hypertension; Hyperlipidemia; and Hyperglycemia    Screenings for behavioral, psychosocial and functional/safety risks, and cognitive dysfunction are all negative except as indicated below. These results, as well as other patient data from the [address] form, are documented in Flowsheets linked to this Encounter. Patient is here for follow up on HTN, dyslipidemia, and hyperglycemia. Patient PMH BP1, bells palsy, GERD, HTN, MARTIN, obesity, PTSD, and seasonal allergies. Patient PSH breast reduction surgery and c-sec. Patient fhx mom HTN, CAD, thyroid disease, and depression. Patient dad HTN, thyroid disease, and depression. Patient is a former smoker. Patient denies regular EtOH consumption. Patient denies illicits. Patient denies specific diet. Patient denies regular aerobic activity. Patient follows with [facility] psychiatry for management of her BP1. Patient states she does follow with GYN and knows she is due for a PAP. She states she had a mammogram about 6 mos ago and states it was normal.     PHQ-9 Total Score: 0 (2020  3:23 PM)    Patient would like referral for ortho-spine for her DDD lumbar spine. She states she has seen PM. They recommended her to see ortho-spine. Patient states she has had recurrent tooth abscess and she states she has one right now. She states she has been following with dentistry however will not be seeing them for another few weeks. She denies f/c. Patient denies cp/sob/le edema/dizziness/lightheadedness/blurry va/ha. She requests referral to plastic surgery to discuss removal of fat pads on the back of her arms.     Allergies   Allergen Reactions    Celebrex [Celecoxib] Shortness Of Breath and Swelling    Pcn [Penicillins]      c/o yeast infection     Trazodone And Nefazodone      Suicidal thoughts         Prior to Visit Medications    Medication Sig Taking?  Authorizing Provider   Blood Pressure Monitor KIT Check BP daily Yes Alexandro Newell, DO   methocarbamol (ROBAXIN) 500 MG tablet Take 1 tablet by mouth 4 times daily Yes Alexandro Newell, DO   acetaminophen (TYLENOL) 500 MG tablet Take 2 tablets by mouth 3 times daily Yes Alexandro Newell, DO   ibuprofen (ADVIL;MOTRIN) 600 MG tablet Take 1 tablet by mouth 3 times daily as needed for Pain Yes Alexandro Newell, DO   sulfamethoxazole-trimethoprim (BACTRIM DS;SEPTRA DS) 800-160 MG per tablet Take 1 tablet by mouth 2 times daily for 7 days Yes Alexandro Newell, DO   Adapalene-Benzoyl Peroxide 0.1-2.5 % GEL apply to face daily  Alexandro Newell, DO   carvedilol (COREG) 12.5 MG tablet take 1 tablet by mouth twice a day  Alexandro Newell, DO   loratadine (CLARITIN) 10 MG tablet Take 1 tablet by mouth daily  Alexandro Newell DO   fluticasone (FLONASE) 50 MCG/ACT nasal spray 1 spray by Each Nostril route daily  Alexandro Newell, DO   cetirizine (ZYRTEC) 10 MG tablet Take 1 tablet by mouth daily  Alexandro Newell DO   ARIPiprazole (ABILIFY) 20 MG tablet take 1 tablet by mouth every morning  Historical Provider, MD   busPIRone (BUSPAR) 15 MG tablet take 1 tablet by mouth twice a day  Historical Provider, MD   benztropine (COGENTIN) 2 MG tablet take 1 tablet by mouth twice a day if needed  Historical Provider, MD   fluconazole (DIFLUCAN) 150 MG tablet take 1 tablet by mouth AS A ONE TIME DOSE  Historical Provider, MD   triamterene-hydrochlorothiazide (MAXZIDE-25) 37.5-25 MG per tablet Take 1 tablet by mouth daily  Junior Marshall MD   lisinopril (PRINIVIL;ZESTRIL) 40 MG tablet take 1 tablet by mouth once daily  Junior Marshall MD   furosemide (LASIX) 40 MG tablet take 1 tablet by mouth once daily  Junior Marshall MD   clotrimazole-betamethasone (LOTRISONE) 1-0.05 % lotion Apply topically 2 times daily. Dotty Cotter MD   mometasone (NASONEX) 50 MCG/ACT nasal spray 2 sprays each nostril QD  Dotty Cotter MD   traZODone (DESYREL) 50 MG tablet Take 1 tablet by mouth nightly  Dotty Cotter MD   hydrOXYzine (VISTARIL) 25 MG capsule take 1-2 capsules by mouth every evening  Historical Provider, MD   lidocaine (XYLOCAINE) 5 % ointment Apply topically as needed. Mateo Libman, MD   ferrous sulfate 325 (65 FE) MG tablet Take 325 mg by mouth 2 times daily. Historical Provider, MD         Past Medical History:   Diagnosis Date    Bell's palsy     Bipolar 1 disorder, depressed ([facility] 2018    GERD (gastroesophageal reflux disease)     HTN (hypertension)     Iron (Fe) deficiency anemia     Obesity     Post traumatic stress disorder     Post-nasal drip     Seasonal allergies        Past Surgical History:   Procedure Laterality Date    BREAST REDUCTION SURGERY       SECTION      Twice         Family History   Problem Relation Age of Onset    High Blood Pressure Mother     Coronary Art Dis Mother     Thyroid Disease Mother     Depression Mother     High Blood Pressure Father     Thyroid Disease Father     Depression Father        CareTeam (Including outside providers/suppliers regularly involved in providing care):   Patient Care Team:  Ghada Monsivais DO as PCP - General (Family Medicine)  Ghada Monsivais DO as PCP - [facility] Empaneled Provider  Elsa Abraham DPM as Surgeon (Podiatry)  Marisol Arnold (Emergency Medicine)    Wt Readings from Last 3 Encounters:   20 163 lb (73.9 kg)   19 151 lb (68.5 kg)   11/15/19 147 lb (66.7 kg)     Vitals:    20 1521 20 1547   BP: (!) 140/90 139/89   Pulse: 72    Resp: 16    Temp: 98.2 °F (36.8 °C)    TempSrc: Temporal    SpO2: 98%    Weight: 163 lb (73.9 kg)    Height: 5' (1.524 m)      Body mass index is 31.83 kg/m².     Based upon direct observation of the patient, evaluation of cognition reveals recent and remote memory intact. ROS:    Constitutional: No fevers, chills, fatigue. ENT: No nasal congestion or sore throat; +tooth pain  Respiratory: No difficulty in breathing or cough. Cardiovascular: No chest pain, palpitations or shortness of breath  Gastrointestinal: No abdominal pain or change in bowel movements. Genitourinary: No change in urinary frequency or dysuria. Skin: No rashes or skin lesions. Neurological: No weakness. No headaches. MSK: +LBP    PE:    GENERAL APPEARANCE: in no acute distress, well developed, well nourished. HEAD: normocephalic, atraumatic. EYES: extraocular movement intact (EOMI), pupils equal, round, reactive to light and accommodation. EARS: normal, tympanic membrane intact, clear, auditory canal clear. NOSE: nares patent, no erythema, sinuses nontender bilaterally, no rhinorrhea. ORAL CAVITY: mucosa moist, no lesions. THROAT: clear, no mass, no exudate. NECK/THYROID: neck supple, full range of motion, no thyromegaly. HEART: no murmurs, regular rate and rhythm, S1, S2 normal.   LUNGS: clear to auscultation bilaterally, no wheezes, rales, rhonchi. ABDOMEN: normal, bowel sounds present, soft, nontender, nondistended, no rebound guarding or rigidity  Musculoskeletal:   Back: Inspection: no apparent swelling, ecchymosis         Palpation: no bone, groin or bursa tenderness          Range of Motion: Full without pain          Strength: 5/5 equal bilaterally; Piriformis: non-tender to palpation          Gait: no pain with weight bearing, able to toe and heel walk with good strength  Reflexes 2/4    Patient's complete Health Risk Assessment and screening values have been reviewed and are found in Flowsheets. The following problems were reviewed today and where indicated follow up appointments were made and/or referrals ordered. Positive Risk Factor Screenings with Interventions:     No Positive Risk Factors identified today.     Personalized Preventive Plan   Current Joellen Kirkpatrick MD, Plastic Surgery, [facility]  -     sulfamethoxazole-trimethoprim (BACTRIM DS;SEPTRA DS) 800-160 MG per tablet; Take 1 tablet by mouth 2 times daily for 7 days    Depression screen          Follow up on labs. Referrals as above. Rx as above for DDD. Reviewed MRI lumbar spine with patient from PM. I agree she needs to see ortho-spine or NS. I told her I would not be providing her with narcotics. She did request these today. Abx as above for tooth pain/poss abscess. Asked her to follow up with her dentist as directed. Asked her to make appt with her GYN for updating PAP. Asked her to log her BP at home and bring log with her to next visit. BMI was elevated today, and weight loss plan recommended is : conventional weight loss. Jeramine Mom DO    Return in about 6 months (around 1/16/2021) for htn/chol; 20 min appt.

## 2020-07-16 NOTE — PATIENT INSTRUCTIONS
Personalized Preventive Plan for Gianni Case - 7/16/2020  Medicare offers a range of preventive health benefits. Some of the tests and screenings are paid in full while other may be subject to a deductible, co-insurance, and/or copay. Some of these benefits include a comprehensive review of your medical history including lifestyle, illnesses that may run in your family, and various assessments and screenings as appropriate. After reviewing your medical record and screening and assessments performed today your provider may have ordered immunizations, labs, imaging, and/or referrals for you. A list of these orders (if applicable) as well as your Preventive Care list are included within your After Visit Summary for your review. Other Preventive Recommendations:    · A preventive eye exam performed by an eye specialist is recommended every 1-2 years to screen for glaucoma; cataracts, macular degeneration, and other eye disorders. · A preventive dental visit is recommended every 6 months. · Try to get at least 150 minutes of exercise per week or 10,000 steps per day on a pedometer . · Order or download the FREE \"Exercise & Physical Activity: Your Everyday Guide\" from [employer]. Call 1-441.561.9256 or search [employer] online. · You need 6463-2874 mg of calcium and 7460-7020 IU of vitamin D per day. It is possible to meet your calcium requirement with diet alone, but a vitamin D supplement is usually necessary to meet this goal.  · When exposed to the sun, use a sunscreen that protects against both UVA and UVB radiation with an SPF of 30 or greater. Reapply every 2 to 3 hours or after sweating, drying off with a towel, or swimming. · Always wear a seat belt when traveling in a car. Always wear a helmet when riding a bicycle or motorcycle. Learning About Obesity  What is obesity? Obesity means having a body mass index (BMI) of 30 or above.  BMI is a number that is calculated from your weight and your height. How do you know if your weight is in the obesity range? To know if your weight is in the obesity range, your doctor looks at your body mass index (BMI) and waist size. BMI is a number that is calculated from your weight and your height. To figure out your BMI for yourself, you can use an online tool, such as http://www.ibrahim.com/ on the [employer]. If your BMI is 30.0 or higher, it falls within the obesity range. Keep in mind that BMI and waist size are only guides. They are not tools to determine your ideal body weight. What causes obesity? When you take in more calories than you burn off, you gain weight. How you eat, how active you are, and other things affect how your body uses calories and whether you gain weight. If you have family members who have too much body fat, you may have inherited a tendency to gain weight. And your family also helps form your eating and lifestyle habits, which can lead to obesity. Also, our busy lives make it harder to plan and cook healthy meals. For many of us, it's easier to reach for prepared foods, go out to eat, or go to the drive-through. But these foods are often high in saturated fat and calories. Portions are often too large. What can you do to reach a healthy weight? Focus on health, not diets. Diets are hard to stay on and don't work in the long run. It is very hard to stay with a diet that includes lots of big changes in your eating habits. Instead of a diet, focus on lifestyle changes that will improve your health and achieve the right balance of energy and calories. To lose weight, you need to burn more calories than you take in. You can do it by eating healthy foods in reasonable amounts and becoming more active, even a little bit every day. Making small changes over time can add up to a lot. Make a plan for change.  Many people have found that naming their reasons for change and staying focused on their plan can make a big difference. Work with your doctor to create a plan that is right for you. Ask yourself: Zena Ledesma are my personal, most powerful reasons for wanting this change? What will my life look like when I've made the change? \"  Set your long-term goal. Make it specific, such as \"I will lose x pounds. \"  Break your long-term goal into smaller, short-term goals. Make these small steps specific and within your reach, things you know you can do. These steps are what keep you going from day to day. Talk with your doctor about other weight-loss options. If you have a BMI in a certain range and have not been able to lose weight with diet and exercise, medicine or surgery may be an option for you. Before your doctor will prescribe medicines or surgery, he or she will probably want you to be more active and follow your healthy eating plan for a period of time. These habits are key lifelong changes for managing your weight, with or without other medical treatment. And these changes can help you avoid weight-related health problems. How can you stay on your plan for change? Be ready. Choose to start during a time when there are few events that might trigger slip-ups, like holidays, social events, and high-stress periods. Decide on your first few steps. Most people have more success when they make small changes, one step at a time. For example, you might switch a daily candy bar to a piece of fruit, walk 10 minutes more, or add more vegetables to a meal.  Line up your support people. Make sure you're not going to be alone as you make this change. Connect with people who understand how important it is to you. Ask family members and friends for help in keeping with your plan. And think about who could make it harder for you, and how to handle them. Try tracking.  People who keep track of what they eat, feel, and do are better at losing weight. Try writing down things like:  What and how much you eat. How you feel before and after each meal.  Details about each meal (like eating out or at home, eating alone, or with friends or family). What you do to be active. Look and plan. As you track, look for patterns that you may want to change. Take note of: When you eat and whether you skip meals. How often you eat out. How many fruits and vegetables you eat. When you eat beyond feeling full. When and why you eat for reasons other than being hungry. When you stray from your plan, don't get upset. Figure out what made you slip up and how you can fix it. Can you take medicines or have surgery to lose weight? If you have a BMI in a certain range and have not been able to lose weight with diet and exercise, medicine or surgery may be an option for you. If you have a BMI of at least 30.0 (or a BMI of at least 27.0 and another health problem related to your weight), ask your doctor about weight-loss medicines. They work by making you feel less hungry, making you feel full more quickly, or changing how you digest fat. Medicines are used along with diet changes and more physical activity to help you make lasting changes. If you have a BMI of 40.0 or more (or a BMI of 35.0 or more and another health problem related to your weight), your doctor may talk with you about surgery. Weight-loss surgery has risks, and you will need to work with your doctor to compare the risk of having obesity with the risks of surgery. With any option you choose, you will still need to eat a healthy diet and get regular exercise. Follow-up care is a key part of your treatment and safety. Be sure to make and go to all appointments, and call your doctor if you are having problems. It's also a good idea to know your test results and keep a list of the medicines you take. Where can you learn more? Go to https://della.healthComunitae. org and sign in to your MyChart account. Enter N111 in the [facility] box to learn more about \"Learning About Obesity. \"     If you do not have an account, please click on the \"Sign Up Now\" link. Current as of: December 11, 2019               Content Version: 12.5  © 0688-6361 Healthwise, Incorporated. Care instructions adapted under license by [facility] ([facility]). If you have questions about a medical condition or this instruction, always ask your healthcare professional. [address] any warranty or liability for your use of this information.     ·

## 2020-08-03 ENCOUNTER — TELEPHONE (OUTPATIENT)
Dept: FAMILY MEDICINE CLINIC | Age: 45
End: 2020-08-03

## 2020-08-12 ENCOUNTER — TELEPHONE (OUTPATIENT)
Dept: FAMILY MEDICINE CLINIC | Age: 45
End: 2020-08-12

## 2020-08-12 RX ORDER — FLUCONAZOLE 150 MG/1
150 TABLET ORAL
Qty: 2 TABLET | Refills: 0 | Status: SHIPPED | OUTPATIENT
Start: 2020-08-12 | End: 2020-08-18

## 2020-08-12 NOTE — TELEPHONE ENCOUNTER
Patient taking an antibiotic and now has a yeast infection. Would you please send in for Diflucan 2 tablets 1 tablet usually doesn't help?     [facility]

## 2020-08-21 RX ORDER — TRIAMTERENE AND HYDROCHLOROTHIAZIDE 37.5; 25 MG/1; MG/1
1 TABLET ORAL DAILY
Qty: 90 TABLET | Refills: 1 | Status: SHIPPED | OUTPATIENT
Start: 2020-08-21 | End: 2021-02-22

## 2020-08-21 NOTE — TELEPHONE ENCOUNTER
Gianni Case is calling to request a refill on the following medication(s):    Medication Request:  Requested Prescriptions     Pending Prescriptions Disp Refills    triamterene-hydroCHLOROthiazide (MAXZIDE-25) 37.5-25 MG per tablet 90 tablet 3     Sig: Take 1 tablet by mouth daily       Last Visit Date (If Applicable):  9/76/8635    Next Visit Date:    1/19/2021

## 2020-10-13 ENCOUNTER — OFFICE VISIT (OUTPATIENT)
Dept: FAMILY MEDICINE CLINIC | Age: 45
End: 2020-10-13
Payer: MEDICARE

## 2020-10-13 VITALS
WEIGHT: 165 LBS | TEMPERATURE: 97.2 F | SYSTOLIC BLOOD PRESSURE: 122 MMHG | DIASTOLIC BLOOD PRESSURE: 72 MMHG | HEART RATE: 92 BPM | BODY MASS INDEX: 32.22 KG/M2

## 2020-10-13 PROCEDURE — 99214 OFFICE O/P EST MOD 30 MIN: CPT | Performed by: FAMILY MEDICINE

## 2020-10-13 PROCEDURE — G8427 DOCREV CUR MEDS BY ELIG CLIN: HCPCS | Performed by: FAMILY MEDICINE

## 2020-10-13 PROCEDURE — G8484 FLU IMMUNIZE NO ADMIN: HCPCS | Performed by: FAMILY MEDICINE

## 2020-10-13 PROCEDURE — G8417 CALC BMI ABV UP PARAM F/U: HCPCS | Performed by: FAMILY MEDICINE

## 2020-10-13 PROCEDURE — 1036F TOBACCO NON-USER: CPT | Performed by: FAMILY MEDICINE

## 2020-10-13 RX ORDER — MONTELUKAST SODIUM 10 MG/1
10 TABLET ORAL DAILY
Qty: 30 TABLET | Refills: 3 | Status: SHIPPED | OUTPATIENT
Start: 2020-10-13 | End: 2021-06-08 | Stop reason: ALTCHOICE

## 2020-10-13 RX ORDER — ADAPALENE AND BENZOYL PEROXIDE .1; 2.5 G/100G; G/100G
GEL TOPICAL
Qty: 45 G | Refills: 2 | Status: SHIPPED | OUTPATIENT
Start: 2020-10-13 | End: 2022-03-30

## 2020-10-13 RX ORDER — CIPROFLOXACIN 500 MG/1
500 TABLET, FILM COATED ORAL 2 TIMES DAILY
Qty: 14 TABLET | Refills: 0 | Status: SHIPPED | OUTPATIENT
Start: 2020-10-13 | End: 2020-10-20

## 2020-10-13 NOTE — PROGRESS NOTES
Progress Note    Mckay Soliz is a 50 y.o.  female who presents today alone for evaluation of   Chief Complaint   Patient presents with    Hemorrhoids     Been going on for a long time            HPI:   Patient is here for evaluation for hemorrhoids today. Patient states she has had this issue for many years. She states she has had some minor bleeding from external hemorrhoids in the past. No bleeding now. Patient denies dark tarry stools. Patient states she does have a daily BM with normal stool caliber. She states she does strain occasionally. She states she consumes 50-60 oz of water per day and has increased fiber in her diet. Patient would like to see a general surgeon for removal.     Patient denies f/c/n/v/d. Patient denies cp/sob/le edema/dizziness/lightheadedness/blurry va/ha. Patient reports seasonal allergies. She states OTC AH do not provide her with significant relief. She denies rash. Patient needs a refill of her cream for her acne.     Health Maintenance Due   Topic Date Due    HIV screen  05/21/1988    Potassium monitoring  06/16/2018    Creatinine monitoring  06/16/2018    Diabetes screen  06/16/2020    Flu vaccine (1) 09/01/2020        Current Medications:     Current Outpatient Medications   Medication Sig Dispense Refill    ciprofloxacin (CIPRO) 500 MG tablet Take 1 tablet by mouth 2 times daily for 7 days 14 tablet 0    Adapalene-Benzoyl Peroxide 0.1-2.5 % GEL daily 45 g 2    montelukast (SINGULAIR) 10 MG tablet Take 1 tablet by mouth daily 30 tablet 3    triamterene-hydroCHLOROthiazide (MAXZIDE-25) 37.5-25 MG per tablet Take 1 tablet by mouth daily 90 tablet 1    lisinopril (PRINIVIL;ZESTRIL) 40 MG tablet take 1 tablet by mouth once daily 90 tablet 1    clotrimazole-betamethasone (LOTRISONE) 1-0.05 % lotion apply topically twice a day 30 mL 1    Blood Pressure Monitor KIT Check BP daily 1 kit 0    acetaminophen (TYLENOL) 500 MG tablet Take 2 tablets by mouth 3 times daily 540 tablet 1    ibuprofen (ADVIL;MOTRIN) 600 MG tablet Take 1 tablet by mouth 3 times daily as needed for Pain 270 tablet 1    carvedilol (COREG) 12.5 MG tablet take 1 tablet by mouth twice a day 180 tablet 1    loratadine (CLARITIN) 10 MG tablet Take 1 tablet by mouth daily 90 tablet 0    fluticasone (FLONASE) 50 MCG/ACT nasal spray 1 spray by Each Nostril route daily 2 Bottle 1    ARIPiprazole (ABILIFY) 20 MG tablet take 1 tablet by mouth every morning  0    busPIRone (BUSPAR) 15 MG tablet take 1 tablet by mouth twice a day  0    benztropine (COGENTIN) 2 MG tablet take 1 tablet by mouth twice a day if needed  0    fluconazole (DIFLUCAN) 150 MG tablet take 1 tablet by mouth AS A ONE TIME DOSE  0    furosemide (LASIX) 40 MG tablet take 1 tablet by mouth once daily 30 tablet 11    mometasone (NASONEX) 50 MCG/ACT nasal spray 2 sprays each nostril QD 1 Inhaler 11    traZODone (DESYREL) 50 MG tablet Take 1 tablet by mouth nightly 30 tablet 0    hydrOXYzine (VISTARIL) 25 MG capsule take 1-2 capsules by mouth every evening  0    lidocaine (XYLOCAINE) 5 % ointment Apply topically as needed. 1 Tube 1    ferrous sulfate 325 (65 FE) MG tablet Take 325 mg by mouth 2 times daily. No current facility-administered medications for this visit. Allergies:      Allergies   Allergen Reactions    Celebrex [Celecoxib] Shortness Of Breath and Swelling    Pcn [Penicillins]      c/o yeast infection     Trazodone And Nefazodone      Suicidal thoughts        Medical History:     Past Medical History:   Diagnosis Date    Bell's palsy     Bipolar 1 disorder, depressed ([facility] [address] 2018    GERD (gastroesophageal reflux disease)     HTN (hypertension)     Iron (Fe) deficiency anemia     Obesity     Post traumatic stress disorder     Post-nasal drip     Seasonal allergies        Past Surgical History:   Procedure Laterality Date    BREAST REDUCTION SURGERY       SECTION Twice       Family History   Problem Relation Age of Onset    High Blood Pressure Mother     Coronary Art Dis Mother     Thyroid Disease Mother     Depression Mother     High Blood Pressure Father     Thyroid Disease Father     Depression Father         Social History:     Social History     Socioeconomic History    Marital status:      Spouse name: Not on file    Number of children: Not on file    Years of education: Not on file    Highest education level: Not on file   Occupational History    Not on file   Social Needs    Financial resource strain: Not on file    Food insecurity     Worry: Not on file     Inability: Not on file    Transportation needs     Medical: Not on file     Non-medical: Not on file   Tobacco Use    Smoking status: Former Smoker     Last attempt to quit: 1995     Years since quittin.7    Smokeless tobacco: Never Used   Substance and Sexual Activity    Alcohol use: Yes     Comment: occational wine    Drug use: No    Sexual activity: Yes     Partners: Male     Birth control/protection: Surgical     Comment: TL    Lifestyle    Physical activity     Days per week: Not on file     Minutes per session: Not on file    Stress: Not on file   Relationships    Social connections     Talks on phone: Not on file     Gets together: Not on file     Attends [employer] service: Not on file     Active member of club or organization: Not on file     Attends meetings of clubs or organizations: Not on file     Relationship status: Not on file    Intimate partner violence     Fear of current or ex partner: Not on file     Emotionally abused: Not on file     Physically abused: Not on file     Forced sexual activity: Not on file   Other Topics Concern    Not on file   Social History Narrative    Not on file        ROS:     Constitutional: No fevers, chills, fatigue.   ENT: + nasal congestion & sinus pressure; no sore throat  Respiratory: No difficulty in breathing or cough. Cardiovascular: No chest pain, palpitations or shortness of breath  Gastrointestinal: No abdominal pain or change in bowel movements. +hemorrhoids  Genitourinary: No change in urinary frequency or dysuria. Skin: +acne; no rash  Neurological: No weakness. No headaches. Last Filed Vitals:  /72   Pulse 92   Temp 97.2 °F (36.2 °C) (Temporal)   Wt 165 lb (74.8 kg)   BMI 32.22 kg/m²      Physical Examination:     GENERAL APPEARANCE: in no acute distress, well developed, well nourished. HEAD: normocephalic, atraumatic. EYES: extraocular movement intact (EOMI), pupils equal, round, reactive to light and accommodation. EARS: normal, tympanic membrane intact, clear, auditory canal clear. NOSE: nares patent, no erythema, sinuses nontender bilaterally, no rhinorrhea. ORAL CAVITY: mucosa moist, no lesions. THROAT: clear, no mass, no exudate. NECK/THYROID: neck supple, full range of motion, no thyromegaly. HEART: no murmurs, regular rate and rhythm, S1, S2 normal.   LUNGS: clear to auscultation bilaterally, no wheezes, rales, rhonchi. ABDOMEN: normal, bowel sounds present, soft, nontender, nondistended, no rebound guarding or rigidity  SKIN: +acne vulgaris face  RECTAL EXAM: +inflammed external hemorrhoid; female chaperone present during exam    Recent Labs/ In Office Testing/ Radiograph review:     Hospital Outpatient Visit on 12/03/2019   Component Date Value Ref Range Status    Specimen Description 12/03/2019 . CERVIX   Final    Chlamydia By Thin Prep 12/03/2019 NEGATIVE  NEGATIVE Final    Comment: CHLAMYDIA TRACHOMATIS DNA not detected by nucleic acid amplification. This test is intended for medical purposes only and is not valid for the evaluation of   suspected sexual abuse or for other forensic purposes. In certain contexts, culture may be required to meet applicable laws and regulations for   diagnosis of C. trachomatis and N. gonorrhoeae infections.   Per 2014  CDC recommendations, this test does not include confirmation of positive results   by an alternative nucleic acid target.  N. gonorrhoeae DNA, Thin Prep 12/03/2019 NEGATIVE  NEGATIVE Final    Comment: NEISSERIA GONORRHOEAE DNA not detected by nucleic acid amplification. This test is intended for medical purposes only and is not valid for the evaluation of   suspected sexual abuse or for other forensic purposes. In certain contexts, culture may be required to meet applicable laws and regulations for   diagnosis of C. trachomatis and N. gonorrhoeae infections. Per 2014  CDC recommendations, this test does not include confirmation of positive results   by an alternative nucleic acid target.  Specimen Description 12/03/2019 . VAGINA   Final    Special Requests 12/03/2019 NOT REPORTED   Final    Direct Exam 12/03/2019 POSITIVE for Gardnerella vaginalis. *  Final    Direct Exam 12/03/2019 NEGATIVE for Trichomonas vaginalis   Final    Direct Exam 12/03/2019 NEGATIVE for Candida sp. Final    Direct Exam 12/03/2019 Method of testing is a DNA probe intended for detection and identification of Candida species, Gardnerella vaginalis, and Trichomonas vaginalis nucleic acid in vaginal fluid specimens from patients with symptoms of vaginitis/vaginosis. Final    Specimen Description 12/03/2019 . CERVIX   Final    HPV Sample 12/03/2019 . THIN PREP   Final    HPV, Genotype 16 12/03/2019 Not Detected  Not Detected Final    HPV, Genotype 18 12/03/2019 Not Detected  Not Detected Final    HPV, High Risk Other 12/03/2019 Not Detected  Not Detected Final    HPV, Interpretation 12/03/2019        Final    Comment: This test amplifies and detects DNA of 14 high-risk HPV types associated with cervical   cancer and its precursor lesions (HPV types 16,18, 31, 33, 35, 39, 45, 51, 52, 56, 58, 59,   66, and 68).         Sensitivity may be affected by specimen collection methods, stage of infection, and the   presence of interfering substances. Results should be interpreted in conjunction with other available laboratory and clinical   data. A negative high-risk HPV result does not exclude the possibility of future cytologic HSIL or   underlying CIN2-3 or cancer. This test is intended for medical purposes only and is not valid for the evaluation of   suspected sexual abuse or for other forensic purposes.  Cytology Report 12/03/2019    Final-Edited                    Value:[id number]  [facility]  ANATOMIC PATHOLOGY  [address] [place] [address], [place] [address]. [facility], [address]  [phone]  Fax: [phone]  GYNECOLOGIC CYTOLOGY REPORT    Patient Name: Tania Davies  MR#: [id number]  Specimen #[id number]  Source:  1: Cervical material, (ThinPrep vial, Imaging-assisted review)    Clinical History  Co-Test:  ThinPrep Pap with high risk HPV testing  LMP:  11/26/19  INTERPRETATION    Cervical material, (ThinPrep vial, Imaging-assisted review):  Specimen Adequacy:       Satisfactory for evaluation.       -Endocervical/transformation zone component is absent. Descriptive Diagnosis:       Negative for intraepithelial lesion or malignancy. Shift in ani suggestive of bacterial vaginosis. Comments:       High Risk HPV testing was ordered. Cytotechnologist:   CHRISTI Thomas JD(ASCP)  **Electronically Signed Out**  poli/12/10/2019          Procedure/Addendum  HPV Procedure Report     Date Ordered:     12/6/2019     S                          tatus:  Signed Out       Date Complete:     12/6/2019     By: CHRISTI Bolton(ASCP)       Date Reported:     12/10/2019       INTERPRETATION  Roche HPV DNA High Risk                                  HPV Sample               Thin Prep                    (Ref Range)  HPV Type 16               Not Detected                    (Not  Detected)  HPV Type 18               Not Detected                    (Not  Detected)  Other High Risk HPV     Not Detected (Not Detected)       This test amplifies and detects DNA of 14 high-risk HPV types  associated with cervical cancer and its precursor lesions (HPV types  16, 18, 31, 33, 35, 39, 45, 51, 52, 56, 58, 59, 66, and 68). Sensitivity may be affected by specimen collection methods, stage of  infection, and the presence of interfering substances. Results should  be interpreted in conjunction with other available laboratory and  clinical data. A negative high-risk HPV result does not exclude the  possibility of future cytologic HS                          [place] or underlying CIN2-3 or cancer. This test is intended for medical purposes only and is not valid for  the evaluation of suspected sexual abuse or for other forensic  purposes. No results found for this visit on 10/13/20. Assessment/Plan:     Fran Minors was seen today for hemorrhoids. Diagnoses and all orders for this visit:    External hemorrhoid  -     ciprofloxacin (CIPRO) 500 MG tablet; Take 1 tablet by mouth 2 times daily for 7 days  -     [facility]FL - Araseli Banks MD, General Surgery, [facility]    Acne vulgaris  -     Adapalene-Benzoyl Peroxide 0.1-2.5 % GEL; daily    Dyslipidemia  -     ALT; Future  -     AST; Future  -     CBC Auto Differential; Future  -     Lipid Panel; Future  -     TSH with Reflex; Future    HTN, goal below 130/80  -     Magnesium; Future  -     Renal Function Panel; Future    Multiphasic screening  -     HIV Screen; Future    Hyperglycemia  -     Hemoglobin A1C; Future    Seasonal allergies  -     montelukast (SINGULAIR) 10 MG tablet; Take 1 tablet by mouth daily    Follow up on labs. Start Singulair for seasonal allergies. Refill of cream as above for acne. Referral to [facility]. Nickie as above. Patient declined updating her vaccinations today. All questions answered and addressed to patient satisfaction. Patient understands and agrees to the plan.      The patient was evaluated and treated today based

## 2020-11-12 ENCOUNTER — TELEPHONE (OUTPATIENT)
Dept: FAMILY MEDICINE CLINIC | Age: 45
End: 2020-11-12

## 2020-11-12 RX ORDER — FLUCONAZOLE 150 MG/1
150 TABLET ORAL ONCE
Qty: 1 TABLET | Refills: 1 | Status: SHIPPED | OUTPATIENT
Start: 2020-11-12 | End: 2020-11-12

## 2020-11-12 NOTE — TELEPHONE ENCOUNTER
Patient states after being on ab she now has a yeast infection.    She wants to know if she can get a Rx for diflucan

## 2020-12-10 ENCOUNTER — HOSPITAL ENCOUNTER (OUTPATIENT)
Age: 45
Setting detail: SPECIMEN
Discharge: HOME | End: 2020-12-10
Payer: MEDICARE

## 2020-12-10 ENCOUNTER — OFFICE VISIT (OUTPATIENT)
Dept: OBGYN CLINIC | Age: 45
End: 2020-12-10
Payer: MEDICARE

## 2020-12-10 VITALS
WEIGHT: 174 LBS | TEMPERATURE: 97.2 F | BODY MASS INDEX: 32.85 KG/M2 | SYSTOLIC BLOOD PRESSURE: 134 MMHG | DIASTOLIC BLOOD PRESSURE: 92 MMHG | HEIGHT: 61 IN | HEART RATE: 66 BPM

## 2020-12-10 PROCEDURE — G8417 CALC BMI ABV UP PARAM F/U: HCPCS | Performed by: OBSTETRICS & GYNECOLOGY

## 2020-12-10 PROCEDURE — 99213 OFFICE O/P EST LOW 20 MIN: CPT | Performed by: OBSTETRICS & GYNECOLOGY

## 2020-12-10 PROCEDURE — 1036F TOBACCO NON-USER: CPT | Performed by: OBSTETRICS & GYNECOLOGY

## 2020-12-10 PROCEDURE — G8484 FLU IMMUNIZE NO ADMIN: HCPCS | Performed by: OBSTETRICS & GYNECOLOGY

## 2020-12-10 PROCEDURE — G8427 DOCREV CUR MEDS BY ELIG CLIN: HCPCS | Performed by: OBSTETRICS & GYNECOLOGY

## 2020-12-10 NOTE — PATIENT INSTRUCTIONS
We will contact you with the results of today's culture and if any treatment is necessary we will forward that to your pharmacy. Please get your mammogram done at your convenience. Return to the office for your annual exam at your convenience. Happy holidays!

## 2020-12-10 NOTE — PROGRESS NOTES
Celestino Escobar presents today with a history of abnormal vaginal discharge. The discharge has been present for several weeks and is associated with malodor particularly after unprotected intercourse. She denies any itching or irritation although she did try OTC antifungals with no change in her symptoms. She denies any fever, chills, nausea/vomiting, abdominal/pelvic discomfort, abnormal bleeding or UTI symptoms. Physical exam:    Vitals:    12/10/20 1138 12/10/20 1141   BP: (!) 128/91 (!) 134/92   Site: Left Upper Arm Left Upper Arm   Position: Sitting Sitting   Cuff Size: Small Adult Small Adult   Pulse: 66    Temp: 97.2 °F (36.2 °C)    Weight: 174 lb (78.9 kg)    Height: 5' 1\" (1.549 m)        The abdomen is soft and nontender to deep palpation without organomegaly, masses or CVAT. Bowel sounds are normally active. External genitalia without lesions or inflammation noted. The vagina with scant amount of thin white appearing discharge and no vaginal or cervical lesions or inflammation noted. Uterus nongravid and without CMT. Adnexa nontender and without abnormal masses bilaterally. Assessment/Plan:    ICD-10-CM    1. Breast cancer screening by mammogram  Z12.31 AURELIA DIGITAL SCREEN W OR WO CAD BILATERAL   2. Vaginal discharge  N89.8 Vaginitis DNA Probe   3. Vaginal odor  N89.8 Vaginitis DNA Probe     Vaginitis DNA probe was done. Urine culture was not sent. She will be contacted with results and recommendations. She will return to the office for her next scheduled appointment or prn. Patient was seen with total face to face time of 15 minutes.

## 2020-12-11 LAB
DIRECT EXAM: ABNORMAL
Lab: ABNORMAL
SPECIMEN DESCRIPTION: ABNORMAL

## 2020-12-14 RX ORDER — METRONIDAZOLE 500 MG/1
500 TABLET ORAL 2 TIMES DAILY
Qty: 14 TABLET | Refills: 0 | Status: SHIPPED | OUTPATIENT
Start: 2020-12-14 | End: 2020-12-21

## 2020-12-15 ENCOUNTER — TELEPHONE (OUTPATIENT)
Dept: OBGYN CLINIC | Age: 45
End: 2020-12-15

## 2020-12-15 RX ORDER — FLUCONAZOLE 150 MG/1
150 TABLET ORAL ONCE
Qty: 1 TABLET | Refills: 0 | Status: SHIPPED | OUTPATIENT
Start: 2020-12-15 | End: 2020-12-15

## 2021-01-12 RX ORDER — CLOTRIMAZOLE AND BETAMETHASONE DIPROPIONATE 10; .5 MG/ML; MG/ML
LOTION TOPICAL
Qty: 30 ML | Refills: 1 | Status: SHIPPED | OUTPATIENT
Start: 2021-01-12 | End: 2021-01-19 | Stop reason: SDUPTHER

## 2021-01-19 DIAGNOSIS — J01.00 ACUTE NON-RECURRENT MAXILLARY SINUSITIS: ICD-10-CM

## 2021-01-19 RX ORDER — FLUTICASONE PROPIONATE 50 MCG
SPRAY, SUSPENSION (ML) NASAL
Qty: 32 G | Refills: 2 | Status: SHIPPED | OUTPATIENT
Start: 2021-01-19 | End: 2021-10-07

## 2021-01-19 RX ORDER — CLOTRIMAZOLE AND BETAMETHASONE DIPROPIONATE 10; .5 MG/ML; MG/ML
LOTION TOPICAL
Qty: 30 ML | Refills: 1 | Status: SHIPPED | OUTPATIENT
Start: 2021-01-19 | End: 2021-10-07

## 2021-01-19 NOTE — TELEPHONE ENCOUNTER
Ashley Armstrong is calling to request a refill on the following medication(s):    Medication Request:  Requested Prescriptions     Pending Prescriptions Disp Refills    fluticasone (FLONASE) 50 MCG/ACT nasal spray [Pharmacy Med Name: FLUTICASONE PROP 50 MCG SPRAY] 32 g      Sig: instill 1 spray into each nostril once daily       Last Visit Date (If Applicable):  98/10/3078 in office    Next Visit Date:    1/19/2021

## 2021-02-08 ENCOUNTER — TELEPHONE (OUTPATIENT)
Dept: FAMILY MEDICINE CLINIC | Age: 46
End: 2021-02-08

## 2021-02-08 DIAGNOSIS — Z76.0 MEDICATION REFILL: Primary | ICD-10-CM

## 2021-02-08 RX ORDER — CLOTRIMAZOLE AND BETAMETHASONE DIPROPIONATE 10; .64 MG/G; MG/G
CREAM TOPICAL
Qty: 45 G | Refills: 0 | Status: SHIPPED
Start: 2021-02-08 | End: 2021-10-07

## 2021-02-08 NOTE — TELEPHONE ENCOUNTER
Pt says she needs the lotrisone cream. 45 grams. Good rx will only cover the cream and not the lotion.  Please send to [facility]

## 2021-02-26 NOTE — TELEPHONE ENCOUNTER
[facility] Artemio is calling to request a refill on the following medication(s):    Medication Request:  Requested Prescriptions     Pending Prescriptions Disp Refills    furosemide (LASIX) 40 MG tablet 30 tablet 11     Sig: take 1 tablet by mouth once daily       Last Visit Date (If Applicable):  34/63/4542    Next Visit Date:    Visit date not found

## 2021-02-26 NOTE — LETTER
Roel Slater,   [facility]  [address] [facility] [address]  Dept: [phone]    3/1/2021    [address]  [address]  [facility] [address]      Dear Kaia Chase    In addition to helping you feel better when you are sick, we are interested in preventing illness and injury in the first place. In the spirit of maintaining your good health, your record indicates that you are due for an appointment.        Please call [phone] to schedule     I look forward to seeing you soon     Sincerely,     Karlo Palumbo

## 2021-02-27 RX ORDER — FUROSEMIDE 40 MG/1
TABLET ORAL
Qty: 30 TABLET | Refills: 0 | Status: SHIPPED | OUTPATIENT
Start: 2021-02-27 | End: 2021-05-03

## 2021-04-12 ENCOUNTER — HOSPITAL ENCOUNTER (OUTPATIENT)
Age: 46
Setting detail: SPECIMEN
Discharge: HOME | End: 2021-04-12
Payer: MEDICARE

## 2021-04-12 DIAGNOSIS — I10 HTN, GOAL BELOW 130/80: ICD-10-CM

## 2021-04-12 DIAGNOSIS — R73.9 HYPERGLYCEMIA: ICD-10-CM

## 2021-04-12 DIAGNOSIS — E78.5 DYSLIPIDEMIA: ICD-10-CM

## 2021-04-12 DIAGNOSIS — Z13.89 MULTIPHASIC SCREENING: ICD-10-CM

## 2021-04-12 LAB
ABSOLUTE EOS #: <0.03 K/UL (ref 0–0.44)
ABSOLUTE IMMATURE GRANULOCYTE: <0.03 K/UL (ref 0–0.3)
ABSOLUTE LYMPH #: 1.63 K/UL (ref 1.1–3.7)
ABSOLUTE MONO #: 0.47 K/UL (ref 0.1–1.2)
ALBUMIN SERPL-MCNC: 4.5 G/DL (ref 3.5–5.2)
ALT SERPL-CCNC: 19 U/L (ref 5–33)
ANION GAP SERPL CALCULATED.3IONS-SCNC: 14 MMOL/L (ref 9–17)
AST SERPL-CCNC: 22 U/L
BASOPHILS # BLD: 1 % (ref 0–2)
BASOPHILS ABSOLUTE: 0.05 K/UL (ref 0–0.2)
BUN BLDV-MCNC: 19 MG/DL (ref 6–20)
BUN/CREAT BLD: ABNORMAL (ref 9–20)
CALCIUM SERPL-MCNC: 9.8 MG/DL (ref 8.6–10.4)
CHLORIDE BLD-SCNC: 97 MMOL/L (ref 98–107)
CHOLESTEROL/HDL RATIO: 2.5
CHOLESTEROL: 142 MG/DL
CO2: 24 MMOL/L (ref 20–31)
CREAT SERPL-MCNC: 0.68 MG/DL (ref 0.5–0.9)
DIFFERENTIAL TYPE: ABNORMAL
EOSINOPHILS RELATIVE PERCENT: 0 % (ref 1–4)
GFR AFRICAN AMERICAN: >60 ML/MIN
GFR NON-AFRICAN AMERICAN: >60 ML/MIN
GFR SERPL CREATININE-BSD FRML MDRD: ABNORMAL ML/MIN/{1.73_M2}
GFR SERPL CREATININE-BSD FRML MDRD: ABNORMAL ML/MIN/{1.73_M2}
GLUCOSE BLD-MCNC: 75 MG/DL (ref 70–99)
HCT VFR BLD CALC: 38.7 % (ref 36.3–47.1)
HDLC SERPL-MCNC: 57 MG/DL
HEMOGLOBIN: 12.7 G/DL (ref 11.9–15.1)
HIV AG/AB: NONREACTIVE
IMMATURE GRANULOCYTES: 0 %
LDL CHOLESTEROL: 76 MG/DL (ref 0–130)
LYMPHOCYTES # BLD: 26 % (ref 24–43)
MAGNESIUM: 2 MG/DL (ref 1.6–2.6)
MCH RBC QN AUTO: 32.9 PG (ref 25.2–33.5)
MCHC RBC AUTO-ENTMCNC: 32.8 G/DL (ref 28.4–34.8)
MCV RBC AUTO: 100.3 FL (ref 82.6–102.9)
MONOCYTES # BLD: 7 % (ref 3–12)
NRBC AUTOMATED: 0 PER 100 WBC
PDW BLD-RTO: 13.4 % (ref 11.8–14.4)
PHOSPHORUS: 3.6 MG/DL (ref 2.6–4.5)
PLATELET # BLD: 335 K/UL (ref 138–453)
PLATELET ESTIMATE: ABNORMAL
PMV BLD AUTO: 9.9 FL (ref 8.1–13.5)
POTASSIUM SERPL-SCNC: 3.7 MMOL/L (ref 3.7–5.3)
RBC # BLD: 3.86 M/UL (ref 3.95–5.11)
RBC # BLD: ABNORMAL 10*6/UL
SEG NEUTROPHILS: 66 % (ref 36–65)
SEGMENTED NEUTROPHILS ABSOLUTE COUNT: 4.12 K/UL (ref 1.5–8.1)
SODIUM BLD-SCNC: 135 MMOL/L (ref 135–144)
TRIGL SERPL-MCNC: 43 MG/DL
TSH SERPL DL<=0.05 MIU/L-ACNC: 0.77 MIU/L (ref 0.3–5)
VLDLC SERPL CALC-MCNC: NORMAL MG/DL (ref 1–30)
WBC # BLD: 6.3 K/UL (ref 3.5–11.3)
WBC # BLD: ABNORMAL 10*3/UL

## 2021-04-13 LAB
ESTIMATED AVERAGE GLUCOSE: 100 MG/DL
HBA1C MFR BLD: 5.1 % (ref 4–6)

## 2021-04-19 RX ORDER — LISINOPRIL 40 MG/1
TABLET ORAL
Qty: 90 TABLET | Refills: 0 | Status: SHIPPED | OUTPATIENT
Start: 2021-04-19 | End: 2021-07-29

## 2021-04-20 ENCOUNTER — OFFICE VISIT (OUTPATIENT)
Dept: FAMILY MEDICINE CLINIC | Age: 46
End: 2021-04-20
Payer: MEDICARE

## 2021-04-20 VITALS
HEART RATE: 89 BPM | OXYGEN SATURATION: 98 % | TEMPERATURE: 97 F | DIASTOLIC BLOOD PRESSURE: 68 MMHG | BODY MASS INDEX: 33.44 KG/M2 | SYSTOLIC BLOOD PRESSURE: 120 MMHG | RESPIRATION RATE: 18 BRPM | WEIGHT: 177 LBS

## 2021-04-20 DIAGNOSIS — Z11.59 ENCOUNTER FOR HCV SCREENING TEST FOR LOW RISK PATIENT: ICD-10-CM

## 2021-04-20 DIAGNOSIS — H53.9 VISION CHANGES: ICD-10-CM

## 2021-04-20 DIAGNOSIS — R73.9 HYPERGLYCEMIA: ICD-10-CM

## 2021-04-20 DIAGNOSIS — H57.89 ABNORMAL EYE COLOR: ICD-10-CM

## 2021-04-20 DIAGNOSIS — E78.5 DYSLIPIDEMIA: ICD-10-CM

## 2021-04-20 DIAGNOSIS — I10 HTN, GOAL BELOW 130/80: Primary | ICD-10-CM

## 2021-04-20 DIAGNOSIS — J30.2 SEASONAL ALLERGIES: ICD-10-CM

## 2021-04-20 DIAGNOSIS — Z71.3 DIETARY COUNSELING: ICD-10-CM

## 2021-04-20 PROCEDURE — 99214 OFFICE O/P EST MOD 30 MIN: CPT | Performed by: FAMILY MEDICINE

## 2021-04-20 PROCEDURE — G8417 CALC BMI ABV UP PARAM F/U: HCPCS | Performed by: FAMILY MEDICINE

## 2021-04-20 PROCEDURE — G8427 DOCREV CUR MEDS BY ELIG CLIN: HCPCS | Performed by: FAMILY MEDICINE

## 2021-04-20 PROCEDURE — 1036F TOBACCO NON-USER: CPT | Performed by: FAMILY MEDICINE

## 2021-04-20 RX ORDER — POLYETHYLENE GLYCOL 400 AND PROPYLENE GLYCOL 4; 3 MG/ML; MG/ML
1 SOLUTION/ DROPS OPHTHALMIC PRN
Qty: 1 BOTTLE | Refills: 0 | Status: SHIPPED | OUTPATIENT
Start: 2021-04-20 | End: 2022-02-07

## 2021-04-20 RX ORDER — TRIAMTERENE AND HYDROCHLOROTHIAZIDE 37.5; 25 MG/1; MG/1
TABLET ORAL
Qty: 90 TABLET | Refills: 1 | Status: SHIPPED | OUTPATIENT
Start: 2021-04-20 | End: 2021-10-25 | Stop reason: SDUPTHER

## 2021-04-20 RX ORDER — FEXOFENADINE HCL 180 MG/1
180 TABLET ORAL DAILY
Qty: 90 TABLET | Refills: 1 | Status: SHIPPED | OUTPATIENT
Start: 2021-04-20 | End: 2021-06-08 | Stop reason: ALTCHOICE

## 2021-04-20 ASSESSMENT — PATIENT HEALTH QUESTIONNAIRE - PHQ9
SUM OF ALL RESPONSES TO PHQ9 QUESTIONS 1 & 2: 0
2. FEELING DOWN, DEPRESSED OR HOPELESS: 0

## 2021-04-20 NOTE — PATIENT INSTRUCTIONS

## 2021-04-20 NOTE — PROGRESS NOTES
1    Adapalene-Benzoyl Peroxide 0.1-2.5 % GEL daily 45 g 2    montelukast (SINGULAIR) 10 MG tablet Take 1 tablet by mouth daily 30 tablet 3    acetaminophen (TYLENOL) 500 MG tablet Take 2 tablets by mouth 3 times daily 540 tablet 1    ibuprofen (ADVIL;MOTRIN) 600 MG tablet Take 1 tablet by mouth 3 times daily as needed for Pain 270 tablet 1    carvedilol (COREG) 12.5 MG tablet take 1 tablet by mouth twice a day 180 tablet 1    ARIPiprazole (ABILIFY) 20 MG tablet take 1 tablet by mouth every morning  0    busPIRone (BUSPAR) 15 MG tablet take 1 tablet by mouth twice a day  0    benztropine (COGENTIN) 2 MG tablet take 1 tablet by mouth twice a day if needed  0    fluconazole (DIFLUCAN) 150 MG tablet take 1 tablet by mouth AS A ONE TIME DOSE  0    mometasone (NASONEX) 50 MCG/ACT nasal spray 2 sprays each nostril QD 1 Inhaler 11    hydrOXYzine (VISTARIL) 25 MG capsule take 1-2 capsules by mouth every evening  0    lidocaine (XYLOCAINE) 5 % ointment Apply topically as needed. 1 Tube 1    ferrous sulfate 325 (65 FE) MG tablet Take 325 mg by mouth 2 times daily. No current facility-administered medications for this visit. Allergies:      Allergies   Allergen Reactions    Celebrex [Celecoxib] Shortness Of Breath and Swelling    Pcn [Penicillins]      c/o yeast infection     Trazodone And Nefazodone      Suicidal thoughts        Medical History:     Past Medical History:   Diagnosis Date    Bell's palsy     Bipolar 1 disorder, depressed ([facility] [address] 2018    GERD (gastroesophageal reflux disease)     HTN (hypertension)     Iron (Fe) deficiency anemia     Obesity     Post traumatic stress disorder     Post-nasal drip     Seasonal allergies        Past Surgical History:   Procedure Laterality Date    BREAST REDUCTION SURGERY       SECTION      Twice       Family History   Problem Relation Age of Onset    High Blood Pressure Mother     Coronary Art Dis Mother     Thyroid Disease Mother     Depression Mother     High Blood Pressure Father     Thyroid Disease Father     Depression Father         Social History:     Social History     Socioeconomic History    Marital status:      Spouse name: Not on file    Number of children: Not on file    Years of education: Not on file    Highest education level: Not on file   Occupational History    Not on file   Social Needs    Financial resource strain: Not on file    Food insecurity     Worry: Not on file     Inability: Not on file    Transportation needs     Medical: Not on file     Non-medical: Not on file   Tobacco Use    Smoking status: Former Smoker     Quit date: 1995     Years since quittin.3    Smokeless tobacco: Never Used   Substance and Sexual Activity    Alcohol use: Yes     Comment: occational wine    Drug use: No    Sexual activity: Yes     Partners: Male     Birth control/protection: Surgical     Comment:     Lifestyle    Physical activity     Days per week: Not on file     Minutes per session: Not on file    Stress: Not on file   Relationships    Social connections     Talks on phone: Not on file     Gets together: Not on file     Attends [employer] service: Not on file     Active member of club or organization: Not on file     Attends meetings of clubs or organizations: Not on file     Relationship status: Not on file    Intimate partner violence     Fear of current or ex partner: Not on file     Emotionally abused: Not on file     Physically abused: Not on file     Forced sexual activity: Not on file   Other Topics Concern    Not on file   Social History Narrative    Not on file        ROS:     Constitutional: No fevers, chills, fatigue. ENT: No nasal congestion or sore throat; +changes in the color of her eyes  Respiratory: No difficulty in breathing or cough.    Cardiovascular: No chest pain, palpitations or shortness of breath  Gastrointestinal: No abdominal pain or change in bowel 04/12/2021 >60  >60 mL/min Final    GFR Comment 04/12/2021        Final    Comment: Average GFR for 38-51 years old:   80 mL/min/1.73sq m  Chronic Kidney Disease:   <60 mL/min/1.73sq m  Kidney failure:   <15 mL/min/1.73sq m              eGFR calculated using average adult body mass. Additional eGFR calculator available at:        [email or web address]            GFR Staging 04/12/2021 NOT REPORTED   Final    Magnesium 04/12/2021 2.0  1.6 - 2.6 mg/dL Final    Cholesterol 04/12/2021 142  <200 mg/dL Final    Comment:    Cholesterol Guidelines:      <200  Desirable   200-240  Borderline      >240  Undesirable         HDL 04/12/2021 57  >40 mg/dL Final    Comment:    HDL Guidelines:    <40     Undesirable   40-59    Borderline    >59     Desirable         LDL Cholesterol 04/12/2021 76  0 - 130 mg/dL Final    Comment:    LDL Guidelines:     <100    Desirable   100-129   Near to/above Desirable   130-159   Borderline      >159   Undesirable     Direct (measured) LDL and calculated LDL are not interchangeable tests.  Chol/HDL Ratio 04/12/2021 2.5  <5 Final            Triglycerides 04/12/2021 43  <150 mg/dL Final    Comment:    Triglyceride Guidelines:     <150   Desirable   150-199  Borderline   200-499  High     >499   Very high   Based on AHA Guidelines for fasting triglyceride, October 2012.  VLDL 04/12/2021 NOT REPORTED  1 - 30 mg/dL Final    HIV Ag/Ab 04/12/2021 NONREACTIVE  NONREACTIVE Final    Comment: No laboratory evidence of HIV infection. If acute HIV infection is suspected, consider   testing for HIV-1 RNA.  Hemoglobin A1C 04/12/2021 5.1  4.0 - 6.0 % Final    Estimated Avg Glucose 04/12/2021 100  mg/dL Final    Comment: The ADA and AACC recommend providing the estimated average glucose result to permit better   patient understanding of their HBA1c result.       WBC 04/12/2021 6.3  3.5 - 11.3 k/uL Final    RBC 04/12/2021 3.86* 3.95 - 5.11 m/uL Final    Hemoglobin 04/12/2021 12.7  11.9 - 15.1 g/dL Final    Hematocrit 04/12/2021 38.7  36.3 - 47.1 % Final    MCV 04/12/2021 100.3  82.6 - 102.9 fL Final    MCH 04/12/2021 32.9  25.2 - 33.5 pg Final    MCHC 04/12/2021 32.8  28.4 - 34.8 g/dL Final    RDW 04/12/2021 13.4  11.8 - 14.4 % Final    Platelets 45/44/0561 335  138 - 453 k/uL Final    MPV 04/12/2021 9.9  8.1 - 13.5 fL Final    NRBC Automated 04/12/2021 0.0  0.0 per 100 WBC Final    Differential Type 04/12/2021 NOT REPORTED   Final    Seg Neutrophils 04/12/2021 66* 36 - 65 % Final    Lymphocytes 04/12/2021 26  24 - 43 % Final    Monocytes 04/12/2021 7  3 - 12 % Final    Eosinophils % 04/12/2021 0* 1 - 4 % Final    Basophils 04/12/2021 1  0 - 2 % Final    Immature Granulocytes 04/12/2021 0  0 % Final    Segs Absolute 04/12/2021 4.12  1.50 - 8.10 k/uL Final    Absolute Lymph # 04/12/2021 1.63  1.10 - 3.70 k/uL Final    Absolute Mono # 04/12/2021 0.47  0.10 - 1.20 k/uL Final    Absolute Eos # 04/12/2021 <0.03  0.00 - 0.44 k/uL Final    Basophils Absolute 04/12/2021 0.05  0.00 - 0.20 k/uL Final    Absolute Immature Granulocyte 04/12/2021 <0.03  0.00 - 0.30 k/uL Final    WBC Morphology 04/12/2021 NOT REPORTED   Final    RBC Morphology 04/12/2021 NOT REPORTED   Final    Platelet Estimate 52/26/8217 NOT REPORTED   Final    AST 04/12/2021 22  <32 U/L Final    ALT 04/12/2021 19  5 - 33 U/L Final       No results found for this visit on 04/20/21. Assessment/Plan:     Miguel Saul was seen today for hyperlipidemia, hypertension and hyperglycemia.     Diagnoses and all orders for this visit:    HTN, goal below 130/80  -     triamterene-hydroCHLOROthiazide (MAXZIDE-25) 37.5-25 MG per tablet; take 1 tablet by mouth once daily    Dyslipidemia    Hyperglycemia    BMI 33.0-33.9,adult    Dietary counseling  -      BMI ABOVE NORMAL F/U    Vision changes  -     AFL - Jessica Iverson MD, Ophthalmology, [place]    Abnormal eye color  -     polyethyl glycol-propyl glycol 0.4-0.3 % (SYSTANE) 0.4-0.3 % ophthalmic solution; Place 1 drop into both eyes as needed for Dry Eyes  -     Comprehensive Metabolic w/Bili Profile; Future  -     AFL - Chris Schaefer MD, Ophthalmology, [place]  -     Hepatitis Panel, Acute; Future    Encounter for HCV screening test for low risk patient  -     Hepatitis Panel, Acute; Future    Seasonal allergies  -     fexofenadine (ALLEGRA) 180 MG tablet; Take 1 tablet by mouth daily    Follow up on labs. Encouraged well balanced diet. Encouraged 150 mins of aerobic activity weekly. Refills as above. Reviewed labs with patient in regards to her hyperglycemia, dyslipidemia, and HTN. Allegra sent to pharmacy. Encouraged regular follow up with her specialists. All questions answered and addressed to patient satisfaction. Patient understands and agrees to the plan. The patient was evaluated and treated today based on the osteopathic principle that each person is a unit of body, mind, and spirit, the body is capable of self-regulation, self-healing, and health maintenance and that structure and function are reciprocally interrelated. Follow-up:   Return in about 6 months (around 10/20/2021) for AMV; 40 min appt. Haleigh Saravia D.O.    BMI was elevated today, and weight loss plan recommended is : conventional weight loss.

## 2021-05-03 RX ORDER — FUROSEMIDE 40 MG/1
TABLET ORAL
Qty: 30 TABLET | Refills: 0 | Status: SHIPPED | OUTPATIENT
Start: 2021-05-03 | End: 2021-06-07

## 2021-05-03 NOTE — TELEPHONE ENCOUNTER
Wilbert Charles is calling to request a refill on the following medication(s):    Medication Request:  Requested Prescriptions     Pending Prescriptions Disp Refills    furosemide (LASIX) 40 MG tablet [Pharmacy Med Name: FUROSEMIDE 40 MG TABLET] 30 tablet 0     Sig: take 1 tablet by mouth once daily       Last Visit Date (If Applicable):  1/40/8986    Next Visit Date:    10/19/2021

## 2021-06-07 RX ORDER — FUROSEMIDE 40 MG/1
TABLET ORAL
Qty: 30 TABLET | Refills: 0 | Status: SHIPPED | OUTPATIENT
Start: 2021-06-07 | End: 2021-06-08 | Stop reason: ALTCHOICE

## 2021-06-07 NOTE — TELEPHONE ENCOUNTER
Ken Hernandez is calling to request a refill on the following medication(s):    Medication Request:  Requested Prescriptions     Pending Prescriptions Disp Refills    furosemide (LASIX) 40 MG tablet [Pharmacy Med Name: FUROSEMIDE 40 MG TABLET] 30 tablet 0     Sig: take 1 tablet by mouth once daily       Last Visit Date (If Applicable):  5/45/4360    Next Visit Date:    Visit date not found

## 2021-06-08 ENCOUNTER — OFFICE VISIT (OUTPATIENT)
Dept: FAMILY MEDICINE CLINIC | Age: 46
End: 2021-06-08
Payer: MEDICARE

## 2021-06-08 VITALS
HEART RATE: 83 BPM | OXYGEN SATURATION: 98 % | SYSTOLIC BLOOD PRESSURE: 126 MMHG | WEIGHT: 173 LBS | DIASTOLIC BLOOD PRESSURE: 84 MMHG | BODY MASS INDEX: 32.66 KG/M2 | TEMPERATURE: 97.7 F | HEIGHT: 61 IN

## 2021-06-08 DIAGNOSIS — R09.81 NASAL CONGESTION: ICD-10-CM

## 2021-06-08 DIAGNOSIS — M54.2 NECK PAIN ON RIGHT SIDE: ICD-10-CM

## 2021-06-08 DIAGNOSIS — J30.2 SEASONAL ALLERGIES: ICD-10-CM

## 2021-06-08 DIAGNOSIS — M25.561 CHRONIC PAIN OF RIGHT KNEE: ICD-10-CM

## 2021-06-08 DIAGNOSIS — I10 ESSENTIAL HYPERTENSION: Primary | ICD-10-CM

## 2021-06-08 DIAGNOSIS — M25.551 RIGHT HIP PAIN: ICD-10-CM

## 2021-06-08 DIAGNOSIS — F33.0 MAJOR DEPRESSIVE DISORDER, RECURRENT EPISODE, MILD (HCC): ICD-10-CM

## 2021-06-08 DIAGNOSIS — J34.89 NASAL OBSTRUCTION: ICD-10-CM

## 2021-06-08 DIAGNOSIS — Z98.890 HISTORY OF SINUS SURGERY: ICD-10-CM

## 2021-06-08 DIAGNOSIS — Z91.14 EPISODIC OVERUSE OF MEDICATION: ICD-10-CM

## 2021-06-08 DIAGNOSIS — L98.7 EXCESS SKIN OF ABDOMEN: ICD-10-CM

## 2021-06-08 DIAGNOSIS — G89.29 CHRONIC PAIN OF RIGHT KNEE: ICD-10-CM

## 2021-06-08 PROBLEM — Z91.148 EPISODIC OVERUSE OF MEDICATION: Status: ACTIVE | Noted: 2021-06-08

## 2021-06-08 PROCEDURE — G8427 DOCREV CUR MEDS BY ELIG CLIN: HCPCS | Performed by: NURSE PRACTITIONER

## 2021-06-08 PROCEDURE — 99214 OFFICE O/P EST MOD 30 MIN: CPT | Performed by: NURSE PRACTITIONER

## 2021-06-08 PROCEDURE — G8417 CALC BMI ABV UP PARAM F/U: HCPCS | Performed by: NURSE PRACTITIONER

## 2021-06-08 PROCEDURE — 1036F TOBACCO NON-USER: CPT | Performed by: NURSE PRACTITIONER

## 2021-06-08 RX ORDER — LIDOCAINE 50 MG/G
OINTMENT TOPICAL
Qty: 1 TUBE | Refills: 1 | Status: SHIPPED | OUTPATIENT
Start: 2021-06-08 | End: 2021-10-06 | Stop reason: SDUPTHER

## 2021-06-08 RX ORDER — FEXOFENADINE HCL 180 MG/1
180 TABLET ORAL DAILY
COMMUNITY
End: 2021-10-05

## 2021-06-08 RX ORDER — CLOTRIMAZOLE AND BETAMETHASONE DIPROPIONATE 10; .64 MG/G; MG/G
CREAM TOPICAL
Qty: 45 G | Refills: 0 | Status: CANCELLED | OUTPATIENT
Start: 2021-06-08

## 2021-06-08 ASSESSMENT — ENCOUNTER SYMPTOMS
SHORTNESS OF BREATH: 0
WHEEZING: 0
RESPIRATORY NEGATIVE: 1
EYE ITCHING: 1
CHEST TIGHTNESS: 0
COUGH: 0
CONSTIPATION: 1

## 2021-06-08 ASSESSMENT — PATIENT HEALTH QUESTIONNAIRE - PHQ9
2. FEELING DOWN, DEPRESSED OR HOPELESS: 1
SUM OF ALL RESPONSES TO PHQ QUESTIONS 1-9: 2
SUM OF ALL RESPONSES TO PHQ9 QUESTIONS 1 & 2: 2
SUM OF ALL RESPONSES TO PHQ QUESTIONS 1-9: 2
SUM OF ALL RESPONSES TO PHQ QUESTIONS 1-9: 2
1. LITTLE INTEREST OR PLEASURE IN DOING THINGS: 1

## 2021-06-08 NOTE — PROGRESS NOTES
[address]  Rubin [facility].  [address] [address]  Dept: [phone]  Dept Fax: [phone]    Jenni Petty is a 48 y.o. female who presents today for her medical conditions/complaintsas noted below. Jenni Petty is c/o of   Chief Complaint   Patient presents with    New Patient    Establish Care    Otalgia     left     Dizziness    Numbness     left thigh     Joint Pain    Other     allergies acting up          HPI:     HPI  Ashok Mchugh is here today to establish. She was seeing another [facility] provider. HTN- BP is well controlled today. Compliant with taking medications. Denies chest pain, dyspnea, edema, or HA. Using furosemide prn for swelling in feet. Is also on maxide. Will get muscle cramping and take potassium or eat several bananas. This does relieve the cramping. Chronic nasal congestion-using afrin daily. Will be able to wean off for a period but goes back to it. History of sinus surgery in the past. Is taking allergy medication but feels that it could be working better. Itching ears and nose. Depression-working with [facility]. Mood is stable. Chronic right hip and knee pain-using topical lidocaine for pain relief which is working well for her. Sees Gyn for pap care    She has lost 100 lbs with diet changes. She has noted a slight increase in her weight. She has not been as strict with her diet. She has a lot of hanging skin on the abdomen and arms. She will place paper towels under her abdomen to keep the area dry. Has had rashes.            Hemoglobin A1C (%)   Date Value   04/12/2021 5.1             ( goal A1Cis < 7)   No results found for: LABMICR  LDL Cholesterol (mg/dL)   Date Value   04/12/2021 76   06/16/2017 77   03/30/2016 74       (goal LDL is <100)   AST (U/L)   Date Value   04/12/2021 22     ALT (U/L)   Date Value   04/12/2021 19     BUN (mg/dL)   Date Value   04/12/2021 19     BP Readings from 2    ARIPiprazole (ABILIFY) 20 MG tablet take 1 tablet by mouth every morning  0    busPIRone (BUSPAR) 15 MG tablet take 1 tablet by mouth twice a day  0    benztropine (COGENTIN) 2 MG tablet take 1 tablet by mouth twice a day if needed  0     No current facility-administered medications for this visit. Allergies   Allergen Reactions    Celebrex [Celecoxib] Shortness Of Breath and Swelling    Pcn [Penicillins]      c/o yeast infection     Trazodone And Nefazodone      Suicidal thoughts       Health Maintenance   Topic Date Due    Hepatitis C screen  Never done    COVID-19 Vaccine (1) Never done    DTaP/Tdap/Td vaccine (1 - Tdap) 06/08/2022 (Originally 5/21/1994)    Cervical cancer screen  06/08/2022 (Originally 12/3/2020)    Annual Wellness Visit (AWV)  07/17/2021    Flu vaccine (Season Ended) 09/01/2021    Potassium monitoring  04/12/2022    Creatinine monitoring  04/12/2022    Lipid screen  04/12/2026    HIV screen  Completed    Hepatitis A vaccine  Aged Out    Hepatitis B vaccine  Aged Out    Hib vaccine  Aged Out    Meningococcal (ACWY) vaccine  Aged Out    Pneumococcal 0-64 years Vaccine  Aged Out       Subjective:     Review of Systems   Constitutional: Negative. HENT: Positive for congestion. Eyes: Positive for itching. Respiratory: Negative. Negative for cough, chest tightness, shortness of breath and wheezing. Cardiovascular: Negative. Negative for chest pain and palpitations. Gastrointestinal: Positive for constipation. Endocrine: Negative. Genitourinary: Negative. Musculoskeletal: Negative. Negative for arthralgias. Skin: Negative. Neurological: Negative. Psychiatric/Behavioral: Positive for dysphoric mood. Objective:     Physical Exam  Constitutional:       Appearance: She is well-developed. HENT:      Head: Normocephalic. Right Ear: Tympanic membrane normal.      Left Ear: Tympanic membrane normal.      Nose: Mucosal edema present. [facility] at [facility]    5. Right hip pain    - lidocaine (XYLOCAINE) 5 % ointment; Apply topically as needed. Dispense: 1 Tube; Refill: 1    6. Neck pain on right side    - lidocaine (XYLOCAINE) 5 % ointment; Apply topically as needed. Dispense: 1 Tube; Refill: 1    7. Nasal congestion    - NATHEN - Arcadio Spaulding MD, Otolaryngology, [place]    8. Nasal obstruction    - NATHEN Spaulding MD, Otolaryngology, [place]    9. History of sinus surgery    - NATHEN Spaulding MD, Otolaryngology, [place]    10. Seasonal allergies    - fexofenadine (ALLEGRA) 180 MG tablet; Take 180 mg by mouth daily    11. Episodic overuse of medication ([address]  Afrin, recommend stop using       Orders Placed This Encounter   Procedures   Stephenie Monterroso MD, Plastic Surgery, [place]     Referral Priority:   Routine     Referral Type:   Eval and Treat     Referral Reason:   Specialty Services Required     Referred to Provider:   Denis Dsouza MD     Requested Specialty:   Plastic Surgery     Number of Visits Requested:   Chelsey Nguyen MD, Otolaryngology, [place]     Referral Priority:   Routine     Referral Type:   Eval and Treat     Referral Reason:   Specialty Services Required     Referred to Provider:   Yang Sexton MD     Requested Specialty:   Otolaryngology     Number of Visits Requested:   1     Orders Placed This Encounter   Medications    lidocaine (XYLOCAINE) 5 % ointment     Sig: Apply topically as needed. Dispense:  1 Tube     Refill:  1       Patient given educational materials - see patient instructions. Discussed use, benefit, and side effects of prescribed medications. All patientquestions answered. Pt voiced understanding. Reviewed health maintenance. Instructedto continue current medications, diet and exercise. Patient agreed with treatmentplan. Follow up as directed.      Electronically signed by ALEISHA Chaves CNP on 6/8/2021 at 1:48 PM

## 2021-07-20 NOTE — PROGRESS NOTES
PANNICULECTOMY HEALTH CHECKLIST:  Height:     Weight:      BMI: There is no height or weight on file to calculate BMI. Does your pannus affect your daily activities and quality of life? Yes x     No    How long: 10+ years   Which daily living activities are affected in the following ways? Cleaning of feet x   Odor    Interferes with exercise x   [place] x   Painful pulling of abdominal skin    Clothes not fitting            Urination x   Pulling of pubic hair    Seat belt not fitting    Other:      Back pain? Yes      No        Any Treatment? Yes      No        Any Testing? Yes      No   Where/What:     Have you had bariatric surgery? Yes      No x        Initial weight: 256 lb        Weight stable for how long? 7 years   Do you have a hernia? Yes      No x        Testing:  CT      Date/location:   Have you had a hernia repair in the past?  Yes      No x        Was mesh used? Yes      No x        Surgeon for hernia:    Any Rashes/Ulcers under folds of skin?   Yes  x    No         Medications used:                                                        OTC Powder,                                                                                                RX \"lotrastome\"         Notes:  Also uses paper towels for rashes

## 2021-07-21 ENCOUNTER — OFFICE VISIT (OUTPATIENT)
Dept: SURGERY | Age: 46
End: 2021-07-21
Payer: COMMERCIAL

## 2021-07-21 VITALS — RESPIRATION RATE: 12 BRPM | WEIGHT: 173 LBS | BODY MASS INDEX: 32.66 KG/M2 | HEIGHT: 61 IN

## 2021-07-21 DIAGNOSIS — E65 SYMPTOMATIC ABDOMINAL PANNICULUS: ICD-10-CM

## 2021-07-21 DIAGNOSIS — M62.08 DIASTASIS OF RECTUS ABDOMINIS: Primary | ICD-10-CM

## 2021-07-21 PROCEDURE — 1036F TOBACCO NON-USER: CPT | Performed by: PLASTIC SURGERY

## 2021-07-21 PROCEDURE — G8417 CALC BMI ABV UP PARAM F/U: HCPCS | Performed by: PLASTIC SURGERY

## 2021-07-21 PROCEDURE — G8427 DOCREV CUR MEDS BY ELIG CLIN: HCPCS | Performed by: PLASTIC SURGERY

## 2021-07-21 PROCEDURE — 99203 OFFICE O/P NEW LOW 30 MIN: CPT | Performed by: PLASTIC SURGERY

## 2021-07-21 NOTE — PROGRESS NOTES
 ARIPiprazole (ABILIFY) 20 MG tablet take 1 tablet by mouth every morning  0    busPIRone (BUSPAR) 15 MG tablet take 1 tablet by mouth twice a day  0    benztropine (COGENTIN) 2 MG tablet take 1 tablet by mouth twice a day if needed  0     No current facility-administered medications for this visit. Allergies: Allergies   Allergen Reactions    Celebrex [Celecoxib] Shortness Of Breath and Swelling    Pcn [Penicillins]      c/o yeast infection     Trazodone And Nefazodone      Suicidal thoughts     Review of Systems:   Constitutional: Negative for fever, chills, fatigue and unexpected weight change. HENT: Patient has postnasal drip. Patient has seasonal allergies. Eyes: Negative for pain and discharge. Respiratory: Negative for cough and shortness of breath. Cardiovascular: Patient has hypertension. Gastrointestinal: Patient has a history of GERD. Skin: Negative for pallor and rash. Neurological: Patient has a history of Bell's palsy. Hematological: Patient has anemia which is consistent with iron deficiency. Psychiatric/Behavioral: Negative for behavioral problems. Patient has posttraumatic stress syndrome. Patient has bipolar disorder. .      Past Medical History:   Diagnosis Date    Bell's palsy     [facility]    GERD (gastroesophageal reflux disease)     HTN (hypertension)     Iron (Fe) deficiency anemia     Obesity     Post traumatic stress disorder     Post-nasal drip     Seasonal allergies      Past Surgical History:   Procedure Laterality Date    BREAST REDUCTION SURGERY       SECTION      Twice    NASAL SINUS SURGERY       Social History     Socioeconomic History    Marital status:      Spouse name: Not on file    Number of children: Not on file    Years of education: Not on file    Highest education level: Not on file   Occupational History    Not on file   Tobacco Use    Smoking status: Former Smoker Quit date: 1995     Years since quittin.5    Smokeless tobacco: Never Used   Vaping Use    Vaping Use: Never used   Substance and Sexual Activity    Alcohol use: Yes     Comment: occational wine    Drug use: No    Sexual activity: Yes     Partners: Male     Birth control/protection: Surgical     Comment: TL    Other Topics Concern    Not on file   Social History Narrative    Not on file     Social Determinants of Health     Financial Resource Strain:     Difficulty of Paying Living Expenses:    Food Insecurity:     Worried About Running Out of Food in the Last Year:     Ran Out of Food in the Last Year:    Transportation Needs:     Lack of Transportation (Medical):  Lack of Transportation (Non-Medical):    Physical Activity:     Days of Exercise per Week:     Minutes of Exercise per Session:    Stress:     Feeling of Stress :    Social Connections:     Frequency of Communication with Friends and Family:     Frequency of Social Gatherings with Friends and Family:     Attends Taoist Services:     Active Member of Clubs or Organizations:     Attends Club or Organization Meetings:     Marital Status:    Intimate Partner Violence:     Fear of Current or Ex-Partner:     Emotionally Abused:     Physically Abused:     Sexually Abused:      Family History   Problem Relation Age of Onset    High Blood Pressure Mother     Coronary Art Dis Mother     Thyroid Disease Mother     Depression Mother     High Blood Pressure Father     Thyroid Disease Father     Depression Father      Physical Exam:   Resp 12   Ht 5' 1\" (1.549 m)   Wt 173 lb (78.5 kg)   BMI 32.69 kg/m²    Body mass index is 32.69 kg/m². Physical Exam   Nursing note and vitals reviewed. Constitutional: Oriented to person, place, and time. Appears well-developed and well-nourished. No distress. Head: Normocephalic and atraumatic.    Eyes: Conjunctivae and EOM are normal.   Pulmonary/Chest: Effort normal. No obstruction    Nasal congestion    Raynaud's phenomenon without gangrene    History of low transverse  section    Bipolar 1 disorder, depressed (HCC)    Post traumatic stress disorder    Primary insomnia    Excess skin of abdomen    Major depressive disorder, recurrent episode, mild (HCC)    Chronic pain of right knee    Right hip pain    Neck pain on right side    History of sinus surgery    Episodic overuse of medication ([facility]       Plan:  Patient with symptomatic panniculus. Patient also has a rectus diastases. I discussed the differences between abdominoplasty and a panniculectomy. The risk of both procedures were discussed with patient in detail. All questions were answered. The following information was discussed with the Patient, but this is not an all-inclusive-other issues were also discussed with patient. I also discussed the following with the patient. I discussed loss of umbilicus. I discussed scars. I discussed dogears. I discussed wound healing. I discussed skin necrosis. I also discussed that status post bariatric surgery that the skin has stretched beyond its limited and there is still there to be elasticity and deformity. We discussed fat necrosis. We discussed postoperative seromas. We discussed postoperative bleeding. Also discussed malposition of the umbilicus. GENERAL INFORMATION  Panniculectomy is a surgical procedure to remove excess skin and fatty tissue from the lower abdomen wall. Panniculectomy does not treat muscle laxity of the upper abdomen. Obese individuals who intend to lose weight should postpone all forms of body contouring surgery until they have reached a stable weight. There are a variety of different techniques used by plastic surgeons for panniculectomy.  Panniculectomy can be combined with other forms of body-contouring surgery, including suction-assisted lipectomy, or performed at the same time with other elective surgeries. ALTERNATIVE TREATMENTS  Alternative forms of management consist of not treating the areas of loose skin and fatty deposits. Liposuction may be a surgical alternative to if there is good skin tone and localized abdominal fatty. If you have lost your skin laxity as apparent by multiple stretch marks you will not be a good liposuction candidate. Diet and exercise programs may be of benefit in the overall reduction of excess body fat and contour improvement. Risks and potential complications are associated with alternative surgical forms of treatment. RISKS OF PANNICULECTOMY SURGERY  Every surgical procedure involves a certain amount of risk and it is important that you understand these risks and the possible complications associated with them. In addition, every procedure has limitations. An individual's choice to undergo a surgical procedure is based on the comparison of the risk to potential benefit. Although the majority of patients do not experience these complications, you should discuss each of them with your plastic surgeon to make sure you completely understand all possible consequences of a abdominoplasty/panniculectomy. Bleeding- It is possible, though unusual, to experience a bleeding episode during or after surgery. There is blood in the pannus, and depending the size of your pannus you may be subjected to considerable blood loss. Should post-operative bleeding occur, it may require an emergency treatment to drain the accumulated blood or blood transfusion. Intra-operative blood transfusions may be required. Do not take any aspirin or anti-inflammatory medications for ten days before surgery, as this may increase the risk of bleeding. Non-prescription herbs and dietary supplements can increase the risk of surgical bleeding. Hematoma can occur at any time following injury.  If blood transfusions are needed to treat blood loss, there is a risk of blood related infections such as hepatitis and the HIV (AIDS). Heparin medications that are used to prevent blood clots in veins can produce bleeding and decreased blood platelets. Infection - Infection is can occur after this surgery. Should an infection occur, treatment including antibiotics, hospitalization, or additional surgery may be necessary. There is a greater risk of infection when body contouring procedures are performed in conjunction with abdominal surgical procedures. Change in Skin Sensation- It is common to experience diminished (or loss) of skin sensation in areas that have had surgery. Diminished (or complete loss of skin sensation) may not totally resolve after an abdominoplasty/pannicular     Skin Contour Irregularities- Contour and shape irregularities and depressions may occur after abdominoplasty. Visible and palpable wrinkling of skin can occur. Residual skin irregularities at the ends of the incisions or dog ears are always a possibility as is skin pleating when there is excessive redundant skin. This may improve with time, or it can be surgically corrected. Major Wound Separation- Wounds may separate after surgery. Should this occur, additional treatment including surgery may be necessary. Skin Discoloration / Swelling- Bruising and swelling normally occurs following panniculectomy. The skin in or near the surgical site can appear either lighter or darker than surrounding skin. Although uncommon, swelling and skin discoloration may persist for long periods of time and, in rare situations, may be permanent. Skin Sensitivity- Itching, tenderness, or exaggerated responses to hot or cold temperatures may occur after surgery. Usually this resolve during healing, but in some situations it may be chronic. Sutures- Most surgical techniques use deep sutures. You may notice these sutures after your surgery.   Sutures may spontaneously poke through the skin, become visible or produce irritation that requires removal.    Damage to Deeper Structures- There is the potential for injury to deeper structures including nerves, blood vessels, muscles, and lungs (pneumothorax), or intra-abdominal injury can occure during any surgical procedure. The potential for this to occur varies according to the type of procedure being performed. Injury to deeper structures may be temporary or permanent. Fat Necrosis- Fatty tissue found deep in the skin might die. This may produce areas of firmness within the skin. Additional surgery to remove areas of fat necrosis may be necessary. There is the possibility of contour irregularities in the skin that may result from fat necrosis. Obesity: If you're BMI is greater than 30 you may have a higher chance of complications. This may include but not limited to wound healing and infections. Also if you have other medical problems such as diabetes and hypertension it may effect your healing as well as your surgical results in bleeding. Umbilicus- Malposition, scarring, unacceptable appearance or loss of the umbilicus (navel) may occur. You may lose the umbilicus when this is combined with a hernia repairs, or due to the strech of the skin the umbilicus has stretched out so much that it can not be salvaged    Pubic Distortion- There will be distortion of their labia and pubic area. Additional treatment including surgery may be necessary. Even with additional surgery she may never have symmetry. At times you may have painful intercourse. Scarring-  All surgery leaves scars, some more visible than others. This surgery will leave large scars. Abnormal scars may occur within the skin and deeper tissues depending on your healing. Scars may be unattractive and of different color than surrounding skin. Scar appearance may also vary within the same scar, exhibit contour variations or \"bunching\" due to the amount of excess skin.   Scars may be asymmetrical (appear different between right and left side of the body). There is the possibility of visible marks in the skin from sutures. In some cases scars may require surgical revision or treatment. Surgical Anesthesia- Both local and general anesthesia involve risk. There is the possibility of complications, injury, and even death from all forms of surgical anesthesia or sedation    Asymmetry-  Symmetrical body appearance may not result from panniculectomy. Factors such as skin tone, fatty deposits, skeletal prominence, and muscle tone may contribute to normal asymmetry in body features. Additional surgery may be necessary to attempt to attempt to improve asymmetry. Allergic Reactions- In some cases, local allergies to tape, suture material and glues, blood products, topical preparations or injected agents have been reported. Serious systemic reactions including shock (anaphylaxis) may occur to drugs used during surgery and prescription medications. Allergic reactions may require additional treatment. Delayed Healing- Wound disruption or delayed wound healing is possible. Some areas of the abdomen may not heal normally and may take a long time to heal.  Some areas of skin or tissue may die. This may require frequent dressing changes or further surgery to remove the non-healed tissue. Smokers have a greater risk of skin loss and wound healing complications. Also patient with certain systemic disease or taking certain medications are at increased risk. Also infections under the pannus may effect your healing. Seroma- Fluid accumulations infrequently occur in between the skin and the abdominal wall. This may require additional procedures for drainage of fluid. Shock- In rare circumstances, your surgical procedure can cause severe trauma, particularly when multiple or extensive procedures are performed.   Although serious complications are infrequent, infections or excessive fluid loss can lead to severe illness and even death. If surgical shock occurs, hospitalization and additional treatment would be necessary. Surgical Wetting Solutions-There is the possibility that large volumes of fluid containing dilute local anesthetic drugs and epinephrine that is injected into fatty deposits during surgery may contribute to fluid overload or systemic reaction to these medications. Additional treatment including hospitalization may be necessary. Persistent Swelling (Lymphedema)- Persistent swelling in the legs can occur following panniculectomy. Pain- You will experience pain after your surgery. Pain of varying intensity and duration may occur and persist after panniculectomy. Chronic pain may occur very  from nerves becoming trapped in scar tissue after panniculectomy. There may be numbness that can occur after a panniculectomy this could be temporary or permanent    Unsatisfactory Result- There is no guarantee or warranty expressed or implied, on the results that may be obtained. You may be disappointed with the results of panniculectomy surgery. This would include risks such as asymmetry, unsatisfactory or highly visible surgical scar location, unacceptable visible deformities, bunching and rippling in the skin near the suture lines or at the ends of the incisions (dog ears), poor healing, wound disruption, and loss of sensation. It may not be possible to correct or improve the effects of surgical scars. In some situations, it may not be possible to achieve optimal results with a single surgical procedure. Additional surgery may be required to improve results. Deep Venous Thrombosis, Cardiac and Pulmonary Complications- Surgery, especially longer procedures, may be associated with the formation of, or increase in, blood clots in the venous system.   Pulmonary complications may occur secondarily to both blood clots (pulmonary emboli), fat deposits (fat emboli) or partial collapse of the lungs or heart rate may cause additional bruising, swelling, and the need for return to surgery and control bleeding. It is wise to refrain from sexual activity until your physician states it is safe. Medications-  There are many adverse reactions that occur as the result of taking over-the-counter, herbal, and/or prescription medications. Be sure to check with your physician about any drug interactions that may exist with medications that you are already taking. If you have an adverse reaction, stop the drugs immediately and call your plastic surgeon for further instructions. If the reaction is severe, go immediately to the nearest emergency room. When taking the prescribed pain medications after surgery, realize that they can affect your thought process and coordination. Do not drive, do not operate complex equipment, do not make any important decisions and do not drink any alcohol while taking these medications. Be sure to take your prescribed medication only as directed. If at blood thinners such as aspirin and Coumadin or Plavix etc. Is prescribed by a physician you must consult with the prescribing physician prior to stopping any of the blood thinners. Our focus is improvement rather than perfection. Complications or less than satisfactory results are sometimes unavoidable, may require additional surgery and often are stressful. Smoking, Second-Hand Smoke Exposure, Nicotine Products (Patch, Gum, Nasal Spray)-   Patients who are currently smoking, use tobacco products, or nicotine products (patch, gum, or nasal spray) are at a greater risk for significant surgical complications of skin dying, delayed healing, and additional scarring. Individuals exposed to second-hand smoke are also at potential risk for similar complications attributable to nicotine exposure.   Additionally, smokers may have a significant negative effect on anesthesia and recovery from anesthesia, with coughing and possibly increased bleeding. Individuals who are not exposed to tobacco smoke or nicotine-containing products have a significantly lower risk of this type of complication. It is important to refrain from smoking at least 6-8 weeks before surgery and I do not smoke for 8 weeks to 3 months after surgery. Post-bariatric patients: It is imperative that quit smoking at least 6-8 weeks before undergoing this procedure as it will adversely affect your outcome. ADDITIONAL SURGERY NECESSARY (RE-OPERATIONS)  There are many variable conditions that may influence the long-term result of surgery. Should complications occur, additional surgery or other treatments may be necessary. Secondary surgery may be necessary to obtain optimal results. Risks and complications can occur with any surgery, the risks cited are particularly associated with abdominoplasty/panniculectomy. Other complications and risks can occur but are even more uncommon. The practice of medicine and surgery is not an exact science. There is no guarantee or warranty expressed or implied, on the results that may be obtained. In some situations, it may not be possible to achieve optimal results with a single surgical procedure or even a multiple procedure. PATIENT COMPLIANCE   Follow all physician instructions carefully; this is essential for the success of your outcome. It is important that the surgical incisions are not subjected to excessive force, swelling, abrasion, or motion during the time of healing. Personal and vocational activity needs to be restricted. Protective dressings and drains should not be removed unless instructed by me. Successful post-operative function depends on both surgery and subsequent care. Physical activity that increases your pulse or heart rate may cause bruising, swelling, fluid accumulation and the need for return to surgery.   It is wise to refrain from intimate physical activities after surgery until your physician states it is safe. It is important that you participate in follow-up care, return for aftercare, and promote your recovery after surgery. FINANCIAL RESPONSIBILITIES  The cost of surgery involves several charges for the services provided. The total includes fees charged by your surgeon, the cost of surgical supplies, anesthesia, laboratory tests, and possible hospital charges, depending on where the surgery is performed. Depending on whether the cost of surgery is covered by an insurance plan, you will be responsible for necessary co-payments, deductibles, and charges not covered. The fees charged for this procedure do not include any potential future costs for additional procedures that you elect to have or require in order to revise, optimize, or complete your outcome. Additional costs may occur should complications develop from the surgery. Secondary surgery or hospital day-surgery charges involved with revision surgery will also be your responsibility. HEALTH INSURANCE  Most health insurance companies exclude coverage for cosmetic surgical operations any complications that might occur from surgery. Please carefully review your health insurance subscriber-information pamphlet or contact your insurance company for a detailed explanation of their policies for covering abdominoplasty/panniculectomy procedures. Most insurance plans may exclude coverage for secondary or revisionary surgery. ASPS consent abdominoplasty    GENERAL INFORMATION  Abdominoplasty is a surgical procedure to remove excess skin and fatty tissue from the middle and lower abdomen and to tighten muscles of the abdominal wall. Abdominoplasty is not a surgical treatment for being overweight. Weight changes will affect your body contouring results. You should not have body contouring until you have reached a stable weight.     ALTERNATIVE TREATMENTS  Alternative forms of management consist of not treating the areas of loose skin and fatty deposits. Liposuction may be a surgical alternative to abdominoplasty if there is good skin tone and localized abdominal fatty deposits in an individual of normal weight. Diet and exercise programs may be of benefit in the overall reduction of excess body fat and contour improvement. Risks and potential complications are also associated with alternative surgical forms of treatment. INHERENT RISKS OF ABDOMINOPLASTY SURGERY  Every surgical procedure involves a certain amount of risk and it is important that you understand these risks and the possible complications associated with them. In addition, every procedure has limitations. An individual's choice to undergo a surgical procedure is based on the comparison of the risk to potential benefit. SPECIFIC RISKS OF ABDOMINOPLASTY SURGERY  Change in Skin Sensation: It is common to experience diminished (or loss) of skin sensation in areas that have had surgery. Diminished (or complete loss of skin sensation) may not totally resolve after an abdominoplasty. Skin Contour Irregularities:  Contour and shape irregularities and depressions may occur after abdominoplasty. Visible and palpable wrinkling of skin can occur. Residual skin irregularities at the ends of the incisions or dog ears are always a possibility as is skin pleating when there is excessive redundant skin. This may improve with time, or it can be surgically corrected. Major Wound Separation:  Wounds may separate after surgery. Should this occur, additional treatment including surgery may be necessary. Umbilicus: Malposition, scarring, unacceptable appearance or loss of the umbilicus (navel) may occur. Pubic Distortion: It is possible, though unusual, for women to develop distortion of their labia and pubic area. Should this occur, additional treatment including surgery may be necessary.     Use of Platelet Gel or Fibrin Sealants Tissue Glue:  Platelet Gel (from your blood) and Fibrin sealants (from heat-treated human blood components to  inactivate virus transmission) may be used to hold tissue layers together at surgery and to diminish postoperative bruising following an abdominoplasty. Sealants have been carefully produced from screened donor blood plasma for hepatitis, syphilis, and human immunodeficiency virus (HIV). These products  have been used safely for many years as sealants in cardiovascular and general surgery. This product is thought to be of help in diminishing surgical bleeding and by adhering layers of tissue together. GENERAL RISKS OF SURGERY  Healing Issues:  Certain medical conditions, dietary supplements and medications may delay and interfere with healing. Patients with massive weight loss may have a healing delay that could result in the incisions coming apart, infection, and tissue changes resulting in the need for additional medical care, surgery, and prolonged hospitalizations. Patients with diabetes or those taking medications such as steroids on an extended basis may have prolonged healing issues. Smoking will cause a delay in the healing process, often resulting in the need for additional surgery. There are general risks associated with healing such as swelling, bleeding, possibility of additional surgery, prolonged recovery, color changes, shape changes, infection, not meeting your goals and expectations, and added expense to the patient. There may also be a longer recovery due to the length of surgery and anesthesia. Patients with significant skin laxity (patients seeking facelifts, breast lifts, abdominoplasty, and body lifts) will continue to have the same lax  skin after surgery. The quality or elasticity of skin will not change, and recurrence of skin looseness will occur at some time in the future, quicker for some than others.  There are nerve endings that may become involved with healing scars from surgery such as suction-assisted lipectomy, abdominoplasty, facelifts, body lifts, and extremity surgery. While there may not be a major nerve injury, the small nerve endings during the healing period may become too active producing a painful or oversensitive area due to the small sensory nerve involved with scar tissue. Often, massage and early non-surgical intervention resolves this. It is important to discuss post-surgical pain with your surgeon. Bleeding: It is possible, to experience a bleeding episode during or after surgery. Should postoperative  bleeding occur, it may require emergency treatment to drain accumulated blood or you may  require a blood transfusion. Increased activity too soon after surgery  can lead to increased chance of bleeding and additional surgery. It is important to follow postoperative instructions and limit exercise and strenuous activity for the instructed time. Do not take any aspirin or anti-inflammatory medications for at least ten days before or after surgery, as this may increase the risk of bleeding. Non-prescription herbs and dietary supplements can increase the risk of surgical bleeding. Hematoma can occur at any time, usually in the first three weeks following injury to the operative area. If blood transfusions are necessary to treat blood loss, there is the risk of blood-related infections such as hepatitis and HIV (AIDS). Heparin medications that are used to prevent blood clots in veins can produce bleeding and decreased blood platelets. Infection:  Infection can occur after surgery. Should an infection occur, additional treatment including antibiotics, hospitalization, or additional surgery may be necessary. It is important to tell your surgeon of any other infections, such as ingrown toenail, insect bite, or urinary tract infection. Remote infections, infection in other part of the body, may lead to an infection in the operated area. Scarring: All surgery leaves scars, some more visible than others.  Although wound healing after a surgical  procedure is expected, abnormal scars may occur within the skin and deeper tissues. Scars may be unattractive and of different color than the surrounding skin tone. Scar appearance may also vary within the same scar. Scars may be asymmetrical (appear different on the right and left side of the body). There is the possibility of visible marks in the skin from sutures. In some cases, scars may require surgical revision or treatment. Firmness:  Excessive firmness can occur after surgery due to internal scarring. The occurrence of this is not  predictable. Additional treatment including surgery may be necessary. Change in Skin Sensation: It is common to experience diminished (or loss) of skin sensation in areas that have had surgery. Diminished (or complete loss of skin sensation) may not totally resolve. Skin Contour Irregularities:  Contour and shape irregularities may occur. Visible and palpable wrinkling of skin may occur. Residual  skin irregularities at the ends of the incisions or dog ears are always a possibility when there is  excessive redundant skin. This may improve with time, or it can be surgically corrected. Skin Discoloration / Swelling:  Some bruising and swelling will normally occur. The skin in or near the surgical site can appear either lighter or darker than surrounding skin. Although uncommon, swelling and skin discoloration may persist for long periods of time and, in rare situations, may be permanent. Skin Sensitivity:  Itching, tenderness, or exaggerated responses to hot or cold temperatures may occur after surgery. Usually this resolves during healing, but in some situations it may be chronic. Major Wound Separation:  Wounds may separate after surgery. Should this occur, additional treatment including surgery may be necessary. Sutures:  Most surgical techniques use deep sutures. You may notice these sutures after your surgery.  Sutures may spontaneously poke by the prescribed drug are possibilities. It is important for you to inform your physician of any problems you have had with any medication or allergies to medication, prescribed or over the counter, as well as medications you now regularly take. Asymmetry:  Symmetrical body appearance may not result after surgery. Factors such as skin tone, fatty deposits, skeletal prominence, and muscle tone may contribute to normal asymmetry in body features. Most patients have differences between the right and left side of their bodies before any surgery is performed. Additional surgery may be necessary to attempt to diminish asymmetry. Surgical Wetting Solutions: There is the possibility that large volumes of fluid containing dilute local anesthetic drugs and epinephrine that is injected into fatty deposits during surgery may contribute to fluid overload or systemic reaction to these medications. Additional treatment including hospitalization may be necessary. Persistent Swelling (Lymphedema):  Persistent swelling can occur following surgery. Unsatisfactory Result:  Although results are expected, there is no guarantee or warranty expressed or implied, on the  results that may be obtained. The body is not asymmetric and almost everyone has some degree of unevenness which may not be recognized in advance. One side of the face may be slightly larger, one side of the face droopier. The breast and trunk area exhibit the same possibilities. Many of such issues cannot be fully corrected with surgery. The more realistic your expectations as to results, the better your results will be in your eye. Some patients never achieve their desired goals or results, at no fault of the surgeon or surgery. You may be disappointed with the results of surgery. Asymmetry, unanticipated shape and size, loss of function, wound disruption, poor healing, and loss of sensation may occur after surgery. Size may be incorrect.  Unsatisfactory surgical scar location or appearance may occur. It may be  necessary to perform additional surgery to improve your results. ADDITIONAL ADVISORIES  Smoking, Second-Hand Smoke Exposure, Nicotine Products (Patch, Gum, Nasal Spray):  Patients who are currently smoking or use tobacco or nicotine products (patch, gum, or nasal spray) are at a greater risk for significant surgical complications of skin dying and delayed healing and additional scarring. Individuals exposed to second-hand smoke are also at potential risk for similar complications attributable to nicotine exposure. Additionally, smoking may have a significant negative effect on anesthesia and recovery from anesthesia, with coughing and possibly increased bleeding. Individuals who are not exposed to tobacco smoke or nicotine-containing products have a significantly lower risk of   this type of complication. I acknowledge that I will inform my physician if I continue to smoke within this time frame, and understand that for my safety, the surgery, if possible, may be delayed. Smoking may have such a negative effect on your surgery that a urine test just before surgery may be done which will prove the presence of Nicotine. If positive, your surgery may be cancelled and your surgery,   Sleep Apnea / CPAP:   Individuals who have breathing disorders such as Obstructive Sleep Apnea and who may rely upon CPAP devices (constant positive airway pressure) or utilize nighttime oxygen are advised that they are at a substantive risk for respiratory arrest and death when they take narcotic pain medications following surgery. This is an important consideration when evaluating the safety of surgical procedures in terms of very serious complications, including death, that relate to pre-existing medical conditions.  Surgery may be considered only with monitoring afterwards in a hospital setting in order to reduce risk of potential respiratory complications and to safely manage pain following surgery. Medications and Herbal Dietary Supplements: There are potential adverse reactions that occur as the result of taking over the counter, herbal, and/or prescription medications. Aspirin and medications that contain aspirin interfere with bleeding. These include non-steroidal anti-inflammatories such as Motrin, Advil, and Aleve. It is very important not to stop drugs that interfere with platelets, such as Plavix, which is used after a stent. It is important if you have had a stent and are taking Plavix that you inform the plastic surgeon. Stopping Plavix may result in a heart attack, stroke and even death. Be sure to check with your prescribing physician prior to stopping any medications. You may have drug interactions that may exist with medications which you are already taking. If you have an adverse reaction, stop the drugs immediately and call for instructions. If the reaction is severe, go immediately to the nearest emergency room. When taking the prescribed pain medications after surgery, realize that they can affect your thought process and coordination. Do not drive, do not operate complex equipment, do not make any important decisions and do not drink any alcohol while taking these medications. Be sure to take your prescribed medication only as directed. Sun Exposure - Direct or Tanning Salon:  The effects of the sun are damaging to the skin. Exposing the treated areas to sun may result in  increased scarring, color changes, and poor healing. Patients who tan, either outdoors or in a salon, should inform their surgeon and either delay treatment, or avoid tanning until the you are told that it is safe to resume. The damaging effect of sun exposure occurs even with the use sun block or clothing coverage. Travel Plans:  Any surgery holds the risk of complications that may delay healing and your return to normal life.  Please let the surgeon know of any travel plans, important commitments already scheduled or planned, or time demands that are important to you, so that appropriate timing of surgery can occur. There are no guarantees that you will be able to resume all activities in the desired time frame. Long-Term Results:  Subsequent alterations in the appearance of your body may occur as the result of aging, sun exposure, weight loss, weight gain, pregnancy, menopause or other circumstances not related to your surgery. Body-Piercing Procedures:  Individuals who currently wear body-piercing jewelry in the surgical region are advised that an infection could develop from this activity. Pregnancy will affect her results. Intimate Relations After Surgery:  Surgery involves coagulating of blood vessels and increased activity of any kind may open these vessels leading to a bleed, or hematoma. Activity that increases your pulse or heart rate may cause additional bruising, swelling, and the need for return to surgery to control bleeding. It is wise to refrain from intimate physical activities until your physician states it is safe. Mental Health Disorders and Elective Surgery: It is important that all patients seeking to undergo elective surgery have realistic expectations that focus on improvement rather than perfection. Complications or less than satisfactory results are sometimes unavoidable, may require additional surgery and often are stressful. lthough many individuals may benefit psychologically from the results of elective  surgery, effects on mental health cannot be accurately predicted. DVT/PE Risks and Advisory: There is a risk of blood clots, Deep Vein Thrombosis (DVT) and Pulmonary Embolus (PE) with every surgical procedure. It varies with the risk factors below. The higher the risk factors, the greater the risk and the more involved you must be in both understanding these risks and, when permitted by your physician, walking and moving your legs.  There may also be leg stockings, squeezing active leg devices, and possibly medicines to help lower your risk. There are many conditions that may increase or affect risks of clotting. Inform your doctor about any past or present history of any of the following:  Past History of Blood Clots  Family History of Blood Clots  Birth Control Pills  Swollen Legs  History of Cancer  Large Dose Vitamins  Varicose Veins  Past Illnesses of the Heart, Liver, Lung, or Gastrointestinal Tract. I understand the risks relating to DVT/PE and how important it is to comply with therapy as  discussed with my surgeon. The methods of preventative therapy include:  Early ambulation when allowed  Compression devices (SCD/ICD)  ASA protocol when allowed (Aspirin)  Heparin protocol when allowed  Enoxaparin protocol when allowed  The risks of DVT/PE may be almost as great as the prophylactic therapy when involving Aspirin, Heparin,  and Exoxaparin. Be aware that if your surgery is elective, those patients with very high risks should consider not proceeding with such elective surgery. ADDITIONAL SURGERY NECESSARY (Re-Operations)  There are many variable conditions that may influence the long-term result of surgery. It is unknown how your tissue may respond or how wound healing will occur after surgery. Secondary surgery may be necessary to perform additional tightening or repositioning of body structures. Should complications occur, additional surgery or other treatments may be necessary. Even though risks and complications occur infrequently, the risks cited are particularly associated with this surgery. Other complications and risks can occur but are even more uncommon. The practice of medicine and surgery is not an exact science. Although good results are expected, there is no guarantee or warranty expressed or implied, on the results that may be obtained. In some situations, it may not be possible to achieve optimal results with a single surgical procedure.  You and your surgeon will discuss the options available should  additional surgery be advised. There may be additional costs and expenses for such additional  procedures, including surgical fees, facility and anesthesia fees, pathology and lab testing. PATIENT COMPLIANCE  Follow all physician instructions carefully; this is essential for the success of your outcome. It is important that the surgical incisions are not subjected to excessive force, swelling, abrasion, or motion during the time of healing. Personal and vocational activity needs to be restricted. Protective dressings and drains should not be removed unless instructed. Successful post-operative function depends on both surgery and subsequent care. Physical activity that increases your pulse or heart rate may cause bruising, swelling, fluid accumulation and the need for return to surgery. It is wise to refrain from intimate physical activities after surgery until your physician states it is safe. It is important that you participate in follow-up care, return for aftercare, and promote your recovery after surgery.         Electronically signed by:  Jordan Ramirez MD 7/21/2021

## 2021-07-21 NOTE — LETTER
[facility]  [address] [facility] [address]  Phone: [phone]  Fax: [phone]           Dang David MD      July 21, 2021     Patient: Domo Iglesias   MR Number: [id number]   YOB: 1975   Date of Visit: 7/21/2021       Dear Dr. Karmen Timmons:    Thank you for referring Nancy Schroeder to me for evaluation/treatment. Below are the relevant portions of my assessment and plan of care. Plan:  Patient with symptomatic panniculus. Patient also has a rectus diastases. I discussed the differences between abdominoplasty and a panniculectomy. The risk of both procedures were discussed with patient in detail. All questions were answered. If you have questions, please do not hesitate to call me. I look forward to following Lara along with you.     Sincerely,    MD Dang Kennedy MD    CC providers:  ALEISHA Austin - CNP  [address] [id number]  [facility]

## 2021-07-29 ENCOUNTER — TELEPHONE (OUTPATIENT)
Dept: SURGERY | Age: 46
End: 2021-07-29

## 2021-07-29 RX ORDER — LISINOPRIL 40 MG/1
TABLET ORAL
Qty: 90 TABLET | Refills: 1 | Status: SHIPPED | OUTPATIENT
Start: 2021-07-29 | End: 2021-10-25 | Stop reason: SDUPTHER

## 2021-09-29 ENCOUNTER — TELEPHONE (OUTPATIENT)
Dept: FAMILY MEDICINE CLINIC | Age: 46
End: 2021-09-29

## 2021-09-29 NOTE — TELEPHONE ENCOUNTER
----- Message from Spencer Reid sent at 9/29/2021  1:47 PM EDT -----  Subject: Message to Provider    QUESTIONS  Information for Provider? patient is requesting a call back in regards of   a phone call she receive today about a couple hours form now from the   office with no voicemail or encounter noted and would like a call back to   discuss what the phone call was regarding.  ---------------------------------------------------------------------------  --------------  [address]  What is the best way for the office to contact you? OK to leave message on   voicemail  Preferred Call Back Phone Number? [phone]  ---------------------------------------------------------------------------  --------------  SCRIPT ANSWERS  Relationship to Patient?  Self

## 2021-09-29 NOTE — TELEPHONE ENCOUNTER
LM informing patient that our office did not contact her today and if she had any questions to call back.

## 2021-10-05 ENCOUNTER — HOSPITAL ENCOUNTER (OUTPATIENT)
Dept: PREADMISSION TESTING | Age: 46
Discharge: HOME | End: 2021-10-09
Payer: COMMERCIAL

## 2021-10-05 VITALS
DIASTOLIC BLOOD PRESSURE: 74 MMHG | HEIGHT: 64 IN | WEIGHT: 165.34 LBS | RESPIRATION RATE: 16 BRPM | TEMPERATURE: 97.6 F | BODY MASS INDEX: 28.23 KG/M2 | HEART RATE: 70 BPM | OXYGEN SATURATION: 99 % | SYSTOLIC BLOOD PRESSURE: 136 MMHG

## 2021-10-05 LAB
ANION GAP SERPL CALCULATED.3IONS-SCNC: 12 MMOL/L (ref 9–17)
BUN BLDV-MCNC: 10 MG/DL (ref 6–20)
CHLORIDE BLD-SCNC: 101 MMOL/L (ref 98–107)
CO2: 25 MMOL/L (ref 20–31)
CREAT SERPL-MCNC: 0.64 MG/DL (ref 0.5–0.9)
GFR AFRICAN AMERICAN: >60 ML/MIN
GFR NON-AFRICAN AMERICAN: >60 ML/MIN
GFR SERPL CREATININE-BSD FRML MDRD: NORMAL ML/MIN/{1.73_M2}
GFR SERPL CREATININE-BSD FRML MDRD: NORMAL ML/MIN/{1.73_M2}
HCT VFR BLD CALC: 39 % (ref 36.3–47.1)
HEMOGLOBIN: 12.9 G/DL (ref 11.9–15.1)
MCH RBC QN AUTO: 31.9 PG (ref 25.2–33.5)
MCHC RBC AUTO-ENTMCNC: 33.1 G/DL (ref 28.4–34.8)
MCV RBC AUTO: 96.3 FL (ref 82.6–102.9)
NRBC AUTOMATED: 0 PER 100 WBC
PDW BLD-RTO: 14.2 % (ref 11.8–14.4)
PLATELET # BLD: 254 K/UL (ref 138–453)
PMV BLD AUTO: 9.7 FL (ref 8.1–13.5)
POTASSIUM SERPL-SCNC: 4 MMOL/L (ref 3.7–5.3)
RBC # BLD: 4.05 M/UL (ref 3.95–5.11)
SODIUM BLD-SCNC: 138 MMOL/L (ref 135–144)
WBC # BLD: 5.4 K/UL (ref 3.5–11.3)

## 2021-10-05 PROCEDURE — 93005 ELECTROCARDIOGRAM TRACING: CPT | Performed by: ANESTHESIOLOGY

## 2021-10-05 PROCEDURE — 84520 ASSAY OF UREA NITROGEN: CPT

## 2021-10-05 PROCEDURE — 82565 ASSAY OF CREATININE: CPT

## 2021-10-05 PROCEDURE — 85027 COMPLETE CBC AUTOMATED: CPT

## 2021-10-05 PROCEDURE — 80051 ELECTROLYTE PANEL: CPT

## 2021-10-05 PROCEDURE — 36415 COLL VENOUS BLD VENIPUNCTURE: CPT

## 2021-10-05 RX ORDER — ASPIRIN 81 MG/1
81 TABLET ORAL DAILY
COMMUNITY

## 2021-10-05 NOTE — H&P (VIEW-ONLY)
History and Physical Service   [facility] [place] - INPATIENT    HISTORY AND PHYSICAL EXAMINATION            Date of Evaluation: 10/5/2021  Patient name:  Sonam Pagan  MRN:   [id number]  YOB: 1975  PCP:    ALEISHA Puga CNP    History Obtained From:     Patient, medical records    History of Present Illness: This is Sonam Pagan a 48 y.o. female who presents for a pre-admission testing appointment for an upcoming panniculectomy and abdominoplasty, tap block, incisional wound vac, and biopatch by Dr. Elda Myers scheduled on 10/19/2021 at 0900 due to diastasis rectus, symptomatic abdominal pannus. Patient has a weight loss of approximately 100lbs about 7 years ago. Patient has complaints of excessive skin interfering with completing ADLs. Patient has chronic redness and pain under abdominal pannus. Patient uses dry pads underneath skin to attempt to keep skin dry. Patient's stomach is between her legs and has difficulty wearing clothing. Functional Capacity per pt:   1) Pt is able to walk 2 city blocks on level ground without SOB. 2) Pt is able to climb 2 flights of stairs without SOB. 3) Pt is able to walk up a hill for 1-2 city blocks without SOB. Past Medical History:     Past Medical History:   Diagnosis Date    Bell's palsy     Bipolar 1 disorder, depressed ([facility] [address] 2018    GERD (gastroesophageal reflux disease)     HTN (hypertension)     Iron (Fe) deficiency anemia     Obesity     Post traumatic stress disorder     Post-nasal drip     Seasonal allergies         Past Surgical History:     Past Surgical History:   Procedure Laterality Date    BREAST REDUCTION SURGERY       SECTION      Twice    NASAL SINUS SURGERY          Medications Prior to Admission:     Prior to Admission medications    Medication Sig Start Date End Date Taking?  Authorizing Provider   aspirin 81 MG EC tablet Take 81 mg by mouth daily   Yes Historical Provider, MD Pseudoephedrine HCl (SUDAFED 24 HOUR PO) Take 1 tablet by mouth daily   Yes Historical Provider, MD   lisinopril (PRINIVIL;ZESTRIL) 40 MG tablet take 1 tablet by mouth once daily 7/29/21   Genell Duverney Turski, APRN - CNP   lidocaine (XYLOCAINE) 5 % ointment Apply topically as needed. 6/8/21   ALEISHA Valdez CNP   polyethyl glycol-propyl glycol 0.4-0.3 % (SYSTANE) 0.4-0.3 % ophthalmic solution Place 1 drop into both eyes as needed for Dry Eyes 4/20/21   Cassandra Burton, DO   triamterene-hydroCHLOROthiazide [facility]) 37.5-25 MG per tablet take 1 tablet by mouth once daily 4/20/21   Cassandra Burton, DO   clotrimazole-betamethasone (LOTRISONE) 1-0.05 % cream Apply topically 2 times daily. 2/8/21   Cassandra Burton, DO   fluticasone [facility]) 50 MCG/ACT nasal spray instill 1 spray into each nostril once daily 1/19/21   Cassandra Burton, DO   clotrimazole-betamethasone (LOTRISONE) 1-0.05 % lotion apply topically twice a day 1/19/21   Cassandra Burton, DO   Adapalene-Benzoyl Peroxide 0.1-2.5 % GEL daily 10/13/20   [facility], [facility]   ARIPiprazole (ABILIFY) 20 MG tablet take 1 tablet by mouth every morning 9/21/19   Historical Provider, MD   busPIRone (BUSPAR) 15 MG tablet take 1 tablet by mouth twice a day 9/21/19   Historical Provider, MD   benztropine (COGENTIN) 2 MG tablet take 1 tablet by mouth twice a day if needed 9/21/19   Historical Provider, MD        Allergies:     Celebrex [celecoxib], Pcn [penicillins], and Trazodone and nefazodone    Social History:     Tobacco:    reports that she quit smoking about 26 years ago. She has never used smokeless tobacco.  Alcohol:      reports current alcohol use. Drug Use:  reports no history of drug use.     Family History:     Family History   Problem Relation Age of Onset    High Blood Pressure Mother     Coronary Art Dis Mother     Thyroid Disease Mother     Depression Mother     High Blood Pressure Father     Thyroid Disease Father     Depression Father Review of Systems:     Positive and Negative as described in HPI. CONSTITUTIONAL: Negative for fevers, chills, sweats, fatigue, and weight loss. HEENT: Runny nose with allergies. Negative for glasses, hearing changes, and throat pain. RESPIRATORY: Negative for shortness of breath, cough, congestion, and wheezing. CARDIOVASCULAR: Negative for chest pain, blood clot, irregular heartbeat, and palpitations. GASTROINTESTINAL: GERD - depending on meals. Occasional constipation. Negative for nausea, vomiting, diarrhea, change in bowel habits, and abdominal pain. GENITOURINARY: Negative for difficulty of urination, burning with urination, and frequency. INTEGUMENT: See HPI. Easy bruising with ASA/Motrin. Negative for skin lesions. HEMATOLOGIC/LYMPHATIC: Negative for swelling/edema. ALLERGIC/IMMUNOLOGIC: Left upper arm itching. Negative for urticaria. ENDOCRINE: Intolerance to cold. Negative for increase in thirst, increase in urination, and heat intolerance. MUSCULOSKELETAL: Occasional joint pains. Negative muscle aches, and swelling of joints. NEUROLOGICAL: Negative for headaches, dizziness, lightheadedness, numbness, and tingling extremities. BEHAVIOR/PSYCH: Anxiety. PTSD - \"putting in IVs in lower arm are a trigger. \" Negative for depression. Physical Exam:   /74   Pulse 70   Temp 97.6 °F (36.4 °C) (Oral)   Resp 16   Ht 5' 4\" (1.626 m)   Wt 165 lb 5.5 oz (75 kg)   SpO2 99%   BMI 28.38 kg/m²   No LMP recorded. Patient is perimenopausal.  [id number]  No results for input(s): POCGLU in the last 72 hours. General Appearance:  Alert, well appearing, and in no acute distress. Mental status:  Oriented to person, place, and time. Head:  Normocephalic and atraumatic. Eye:  No icterus, redness, pupils equal and reactive, extraocular eye movements intact, and conjunctiva clear. Ear:  Hearing grossly intact. Nose:  No drainage noted.   Mouth:  Mucous membranes moist.  Neck:  Supple and no Panniculectomy and abdominoplasty, tap block, incisional wound vac, biopatch      Jocelin Jones, APRN - CNP  10/5/2021  11:12 AM    Geralyn Najjar, MD   Physician   Specialty:  Plastic Surgery   Progress Notes      Signed   Encounter Date:  7/21/2021               Signed        Expand AllCollapse All     Show:Clear all  [x]Manual[x]Template[x]Copied    Added by:  Gunnar Butler MD    []Sneha for details    [address]  [facility] [id number]         History and Physical           Chief Complaint   Patient presents with    New Patient       panniculectomy          HPI:   Shirin Brown is a 48 y.o. female who presents with symptomatic panniculectomy. Patient has lost approximately 100 pounds through diet and exercise. Patient's initial weight was 256 pounds. Patient's weight has been stable for 7 years. Patient has excess skin in her abdominal region that interferes with her daily activities and her quality of life. Patient has trouble cleaning her feet. The pannus has an odor to it. It interferes with additional exercises. It interferes with intercourse. It is painful and pulls on the abdominal skin. It prevents her close from not fitting well. A difference with her urination. Patient is here to discuss her options. Patient also continually gets rashes and has performed multiple treatments but they consist generally recur. .     Medications:      Current Facility-Administered Medications          Current Outpatient Medications   Medication Sig Dispense Refill    lidocaine (XYLOCAINE) 5 % ointment Apply topically as needed.  1 Tube 1    fexofenadine (ALLEGRA) 180 MG tablet Take 180 mg by mouth daily        polyethyl glycol-propyl glycol 0.4-0.3 % (SYSTANE) 0.4-0.3 % ophthalmic solution Place 1 drop into both eyes as needed for Dry Eyes 1 Bottle 0    triamterene-hydroCHLOROthiazide (MAXZIDE-25) 37.5-25 MG per tablet take 1 tablet by mouth once daily 90 tablet 1    lisinopril (PRINIVIL;ZESTRIL) 40 MG tablet take 1 tablet by mouth once daily 90 tablet 0    clotrimazole-betamethasone (LOTRISONE) 1-0.05 % cream Apply topically 2 times daily. 45 g 0    fluticasone (FLONASE) 50 MCG/ACT nasal spray instill 1 spray into each nostril once daily 32 g 2    clotrimazole-betamethasone (LOTRISONE) 1-0.05 % lotion apply topically twice a day 30 mL 1    Adapalene-Benzoyl Peroxide 0.1-2.5 % GEL daily 45 g 2    ARIPiprazole (ABILIFY) 20 MG tablet take 1 tablet by mouth every morning   0    busPIRone (BUSPAR) 15 MG tablet take 1 tablet by mouth twice a day   0    benztropine (COGENTIN) 2 MG tablet take 1 tablet by mouth twice a day if needed   0      No current facility-administered medications for this visit. Allergies: Allergies   Allergen Reactions    Celebrex [Celecoxib] Shortness Of Breath and Swelling    Pcn [Penicillins]         c/o yeast infection     Trazodone And Nefazodone         Suicidal thoughts      Review of Systems:   Constitutional: Negative for fever, chills, fatigue and unexpected weight change. HENT: Patient has postnasal drip. Patient has seasonal allergies. Eyes: Negative for pain and discharge. Respiratory: Negative for cough and shortness of breath. Cardiovascular: Patient has hypertension. Gastrointestinal: Patient has a history of GERD. Skin: Negative for pallor and rash. Neurological: Patient has a history of Bell's palsy. Hematological: Patient has anemia which is consistent with iron deficiency. Psychiatric/Behavioral: Negative for behavioral problems. Patient has posttraumatic stress syndrome. Patient has bipolar disorder. .       Past Medical History        Past Medical History:   Diagnosis Date    Bell's palsy      [facility]    GERD (gastroesophageal reflux disease)      HTN (hypertension)      Iron (Fe) deficiency anemia      Obesity  Post traumatic stress disorder      Post-nasal drip      Seasonal allergies           Past Surgical History         Past Surgical History:   Procedure Laterality Date    BREAST REDUCTION SURGERY         SECTION         Twice    NASAL SINUS SURGERY             Social History               Socioeconomic History    Marital status:        Spouse name: Not on file    Number of children: Not on file    Years of education: Not on file    Highest education level: Not on file   Occupational History    Not on file   Tobacco Use    Smoking status: Former Smoker       Quit date: 1995       Years since quittin.5    Smokeless tobacco: Never Used   Vaping Use    Vaping Use: Never used   Substance and Sexual Activity    Alcohol use: Yes       Comment: occational wine    Drug use: No    Sexual activity: Yes       Partners: Male       Birth control/protection: Surgical       Comment: TL    Other Topics Concern    Not on file   Social History Narrative    Not on file      Social Determinants of Health          Financial Resource Strain:     Difficulty of Paying Living Expenses:    Food Insecurity:     Worried About Running Out of Food in the Last Year:     Ran Out of Food in the Last Year:    Transportation Needs:     Lack of Transportation (Medical):      Lack of Transportation (Non-Medical):    Physical Activity:     Days of Exercise per Week:     Minutes of Exercise per Session:    Stress:     Feeling of Stress :    Social Connections:     Frequency of Communication with Friends and Family:     Frequency of Social Gatherings with Friends and Family:     Attends Yazdanism Services:     Active Member of Clubs or Organizations:     Attends Club or Organization Meetings:     Marital Status:    Intimate Partner Violence:     Fear of Current or Ex-Partner:     Emotionally Abused:     Physically Abused:     Sexually Abused:          Family History         Family History Problem Relation Age of Onset    High Blood Pressure Mother      Coronary Art Dis Mother      Thyroid Disease Mother      Depression Mother      High Blood Pressure Father      Thyroid Disease Father      Depression Father           Physical Exam:   Resp 12   Ht 5' 1\" (1.549 m)   Wt 173 lb (78.5 kg)   BMI 32.69 kg/m²    Body mass index is 32.69 kg/m². Physical Exam   Nursing note and vitals reviewed. Constitutional: Oriented to person, place, and time. Appears well-developed and well-nourished. No distress. Head: Normocephalic and atraumatic. Eyes: Conjunctivae and EOM are normal.   Pulmonary/Chest: Effort normal. No respiratory distress. Neurological: Alert and oriented to person, place, and time. Skin: There is multiple stria present. Patient has a  scar. There is rashes present. Abdomen soft tender nondistended. There is rectus diastases present. Psychiatric: Normal mood and affect. Behavior is normal  PANNICULECTOMY HEALTH CHECKLIST:  Height:     Weight:      BMI: There is no height or weight on file to calculate BMI. Does your pannus affect your daily activities and quality of life? Yes x     No    How long: 10+ years   Which daily living activities are affected in the following ways? Cleaning of feet x   Odor    Interferes with exercise x   [place] x   Painful pulling of abdominal skin    Clothes not fitting            Urination x   Pulling of pubic hair    Seat belt not fitting    Other:      Back pain? Yes      No        Any Treatment? Yes      No        Any Testing? Yes      No   Where/What:      Have you had bariatric surgery? Yes      No x        Initial weight: 256 lb        Weight stable for how long? 7 years   Do you have a hernia? Yes      No x        Testing:  CT      Date/location:   Have you had a hernia repair in the past?  Yes      No x        Was mesh used? Yes      No x        Surgeon for hernia:     Any Rashes/Ulcers under folds of skin?   Yes x    No         Medications used:                                                        OTC Powder,                                                                                                RX \"lotrastome\"          Notes:  Also uses paper towels for rashes  Imaging:                                      Impression/Plan:        Diagnosis Orders   1. Diastasis of rectus abdominis      2. Symptomatic abdominal panniculus             Patient Active Problem List   Diagnosis    Seasonal allergies    HTN (hypertension)    Iron (Fe) deficiency anemia    Obesity    GERD (gastroesophageal reflux disease)    Bell's palsy    Post-nasal drip    Nasal obstruction    Nasal congestion    Raynaud's phenomenon without gangrene    History of low transverse  section    Bipolar 1 disorder, depressed ([address]    Post traumatic stress disorder    Primary insomnia    Excess skin of abdomen    Major depressive disorder, recurrent episode, mild (HCC)    Chronic pain of right knee    Right hip pain    Neck pain on right side    History of sinus surgery    Episodic overuse of medication ([address]         Plan:  Patient with symptomatic panniculus. Patient also has a rectus diastases. I discussed the differences between abdominoplasty and a panniculectomy. The risk of both procedures were discussed with patient in detail. All questions were answered. The following information was discussed with the Patient, but this is not an all-inclusive-other issues were also discussed with patient. I also discussed the following with the patient. I discussed loss of umbilicus. I discussed scars. I discussed dogears. I discussed wound healing. I discussed skin necrosis. I also discussed that status post bariatric surgery that the skin has stretched beyond its limited and there is still there to be elasticity and deformity. We discussed fat necrosis. We discussed postoperative seromas.   We discussed postoperative bleeding. Also discussed malposition of the umbilicus. GENERAL INFORMATION  Panniculectomy is a surgical procedure to remove excess skin and fatty tissue from the lower abdomen wall. Panniculectomy does not treat muscle laxity of the upper abdomen. Obese individuals who intend to lose weight should postpone all forms of body contouring surgery until they have reached a stable weight. There are a variety of different techniques used by plastic surgeons for panniculectomy. Panniculectomy can be combined with other forms of body-contouring surgery, including suction-assisted lipectomy, or performed at the same time with other elective surgeries. ALTERNATIVE TREATMENTS  Alternative forms of management consist of not treating the areas of loose skin and fatty deposits. Liposuction may be a surgical alternative to if there is good skin tone and localized abdominal fatty. If you have lost your skin laxity as apparent by multiple stretch marks you will not be a good liposuction candidate. Diet and exercise programs may be of benefit in the overall reduction of excess body fat and contour improvement. Risks and potential complications are associated with alternative surgical forms of treatment. RISKS OF PANNICULECTOMY SURGERY  Every surgical procedure involves a certain amount of risk and it is important that you understand these risks and the possible complications associated with them. In addition, every procedure has limitations. An individual's choice to undergo a surgical procedure is based on the comparison of the risk to potential benefit. Although the majority of patients do not experience these complications, you should discuss each of them with your plastic surgeon to make sure you completely understand all possible consequences of a abdominoplasty/panniculectomy. Bleeding- It is possible, though unusual, to experience a bleeding episode during or after surgery. There is blood in the pannus, and depending the size of your pannus you may be subjected to considerable blood loss. Should post-operative bleeding occur, it may require an emergency treatment to drain the accumulated blood or blood transfusion. Intra-operative blood transfusions may be required. Do not take any aspirin or anti-inflammatory medications for ten days before surgery, as this may increase the risk of bleeding. Non-prescription herbs and dietary supplements can increase the risk of surgical bleeding. Hematoma can occur at any time following injury. If blood transfusions are needed to treat blood loss, there is a risk of blood related infections such as hepatitis and the HIV (AIDS). Heparin medications that are used to prevent blood clots in veins can produce bleeding and decreased blood platelets. Infection - Infection is can occur after this surgery. Should an infection occur, treatment including antibiotics, hospitalization, or additional surgery may be necessary. There is a greater risk of infection when body contouring procedures are performed in conjunction with abdominal surgical procedures. Change in Skin Sensation- It is common to experience diminished (or loss) of skin sensation in areas that have had surgery. Diminished (or complete loss of skin sensation) may not totally resolve after an abdominoplasty/pannicular      Skin Contour Irregularities- Contour and shape irregularities and depressions may occur after abdominoplasty. Visible and palpable wrinkling of skin can occur. Residual skin irregularities at the ends of the incisions or dog ears are always a possibility as is skin pleating when there is excessive redundant skin. This may improve with time, or it can be surgically corrected. Major Wound Separation- Wounds may separate after surgery. Should this occur, additional treatment including surgery may be necessary.      Skin Discoloration / Swelling- Bruising and swelling normally occurs following panniculectomy. The skin in or near the surgical site can appear either lighter or darker than surrounding skin. Although uncommon, swelling and skin discoloration may persist for long periods of time and, in rare situations, may be permanent. Skin Sensitivity- Itching, tenderness, or exaggerated responses to hot or cold temperatures may occur after surgery. Usually this resolve during healing, but in some situations it may be chronic. Sutures- Most surgical techniques use deep sutures. You may notice these sutures after your surgery. Sutures may spontaneously poke through the skin, become visible or produce irritation that requires removal.     Damage to Deeper Structures- There is the potential for injury to deeper structures including nerves, blood vessels, muscles, and lungs (pneumothorax), or intra-abdominal injury can occure during any surgical procedure. The potential for this to occur varies according to the type of procedure being performed. Injury to deeper structures may be temporary or permanent. Fat Necrosis- Fatty tissue found deep in the skin might die. This may produce areas of firmness within the skin. Additional surgery to remove areas of fat necrosis may be necessary. There is the possibility of contour irregularities in the skin that may result from fat necrosis. Obesity: If you're BMI is greater than 30 you may have a higher chance of complications. This may include but not limited to wound healing and infections. Also if you have other medical problems such as diabetes and hypertension it may effect your healing as well as your surgical results in bleeding. Umbilicus- Malposition, scarring, unacceptable appearance or loss of the umbilicus (navel) may occur.   You may lose the umbilicus when this is combined with a hernia repairs, or due to the strech of the skin the umbilicus has stretched out so much that it can not be salvaged     Pubic Distortion- There will be distortion of their labia and pubic area. Additional treatment including surgery may be necessary. Even with additional surgery she may never have symmetry. At times you may have painful intercourse. Scarring-  All surgery leaves scars, some more visible than others. This surgery will leave large scars. Abnormal scars may occur within the skin and deeper tissues depending on your healing. Scars may be unattractive and of different color than surrounding skin. Scar appearance may also vary within the same scar, exhibit contour variations or \"bunching\" due to the amount of excess skin. Scars may be asymmetrical (appear different between right and left side of the body). There is the possibility of visible marks in the skin from sutures. In some cases scars may require surgical revision or treatment. Surgical Anesthesia- Both local and general anesthesia involve risk. There is the possibility of complications, injury, and even death from all forms of surgical anesthesia or sedation     Asymmetry-  Symmetrical body appearance may not result from panniculectomy. Factors such as skin tone, fatty deposits, skeletal prominence, and muscle tone may contribute to normal asymmetry in body features. Additional surgery may be necessary to attempt to attempt to improve asymmetry. Allergic Reactions- In some cases, local allergies to tape, suture material and glues, blood products, topical preparations or injected agents have been reported. Serious systemic reactions including shock (anaphylaxis) may occur to drugs used during surgery and prescription medications. Allergic reactions may require additional treatment. Delayed Healing- Wound disruption or delayed wound healing is possible. Some areas of the abdomen may not heal normally and may take a long time to heal.  Some areas of skin or tissue may die.   This may require frequent dressing changes or further surgery to remove the non-healed tissue. Smokers have a greater risk of skin loss and wound healing complications. Also patient with certain systemic disease or taking certain medications are at increased risk. Also infections under the pannus may effect your healing. Seroma- Fluid accumulations infrequently occur in between the skin and the abdominal wall. This may require additional procedures for drainage of fluid. Shock- In rare circumstances, your surgical procedure can cause severe trauma, particularly when multiple or extensive procedures are performed. Although serious complications are infrequent, infections or excessive fluid loss can lead to severe illness and even death. If surgical shock occurs, hospitalization and additional treatment would be necessary. [employer]-There is the possibility that large volumes of fluid containing dilute local anesthetic drugs and epinephrine that is injected into fatty deposits during surgery may contribute to fluid overload or systemic reaction to these medications. Additional treatment including hospitalization may be necessary. Persistent Swelling (Lymphedema)- Persistent swelling in the legs can occur following panniculectomy. Pain- You will experience pain after your surgery. Pain of varying intensity and duration may occur and persist after panniculectomy. Chronic pain may occur very  from nerves becoming trapped in scar tissue after panniculectomy. There may be numbness that can occur after a panniculectomy this could be temporary or permanent     Unsatisfactory Result- There is no guarantee or warranty expressed or implied, on the results that may be obtained. You may be disappointed with the results of panniculectomy surgery.   This would include risks such as asymmetry, unsatisfactory or highly visible surgical scar location, unacceptable visible deformities, bunching and rippling in the skin near the suture lines or at the ends of the incisions (dog ears), poor healing, wound disruption, and loss of sensation. It may not be possible to correct or improve the effects of surgical scars. In some situations, it may not be possible to achieve optimal results with a single surgical procedure. Additional surgery may be required to improve results. Deep Venous Thrombosis, Cardiac and Pulmonary Complications- Surgery, especially longer procedures, may be associated with the formation of, or increase in, blood clots in the venous system. Pulmonary complications may occur secondarily to both blood clots (pulmonary emboli), fat deposits (fat emboli) or partial collapse of the lungs after general anesthesia. Pulmonary and fat emboli can be life-threatening or fatal in some circumstances. Air travel, inactivity and other conditions may increase the incidence of blood clots traveling to the lungs causing a major blood clot that may result in death. It is important to discuss with your physician any past history of blood clots, swollen legs or the use of estrogen or birth control pills that may contribute to this condition. Cardiac complications are a risk with any surgery and anesthesia, even in patients without symptoms. Should any of these complications occur, you may require hospitalization and additional treatment. If you experience shortness of breath, chest pains, or unusual heart beats, seek medical attention immediately. ADDITIONAL ADVISORIES     Long-Term Results- Subsequent alterations in the appearance of your body may occur as the result of aging, sun exposure, weight loss, weight gain, pregnancy, menopause or other circumstances not related to your surgery. Metabolic Status of Massive Weight Loss Patients- Your personal metabolic status of blood chemistry and protein levels may be abnormal following massive weight loss and surgical procedures to make a patient loose weight.  Individuals with abnormalities may be a risk for serious medical and surgical complications, including delayed wound healing, infection or even in rare cases, death. Body-Piercing Procedures- Individuals who currently wear body-piercing jewelry or are seeking to undergo body-piercing procedures must consider the possibility that an infection could develop anytime following this procedure. Treatment including antibiotics, hospitalization or additional surgery may be necessary. Intimate Relations After Surgery- Surgery involves coagulating of blood vessels and increased activity of any kind may open these vessels leading to a bleed, or hematoma. Increased activity that increased your pulse or heart rate may cause additional bruising, swelling, and the need for return to surgery and control bleeding. It is wise to refrain from sexual activity until your physician states it is safe. Medications-  There are many adverse reactions that occur as the result of taking over-the-counter, herbal, and/or prescription medications. Be sure to check with your physician about any drug interactions that may exist with medications that you are already taking. If you have an adverse reaction, stop the drugs immediately and call your plastic surgeon for further instructions. If the reaction is severe, go immediately to the nearest emergency room. When taking the prescribed pain medications after surgery, realize that they can affect your thought process and coordination. Do not drive, do not operate complex equipment, do not make any important decisions and do not drink any alcohol while taking these medications. Be sure to take your prescribed medication only as directed. If at blood thinners such as aspirin and Coumadin or Plavix etc. Is prescribed by a physician you must consult with the prescribing physician prior to stopping any of the blood thinners. Our focus is improvement rather than perfection.   Complications or less than satisfactory results are sometimes unavoidable, may require additional surgery and often are stressful. Smoking, Second-Hand Smoke Exposure, Nicotine Products (Patch, Gum, Nasal Spray)-   Patients who are currently smoking, use tobacco products, or nicotine products (patch, gum, or nasal spray) are at a greater risk for significant surgical complications of skin dying, delayed healing, and additional scarring. Individuals exposed to second-hand smoke are also at potential risk for similar complications attributable to nicotine exposure. Additionally, smokers may have a significant negative effect on anesthesia and recovery from anesthesia, with coughing and possibly increased bleeding. Individuals who are not exposed to tobacco smoke or nicotine-containing products have a significantly lower risk of this type of complication. It is important to refrain from smoking at least 6-8 weeks before surgery and I do not smoke for 8 weeks to 3 months after surgery. Post-bariatric patients: It is imperative that quit smoking at least 6-8 weeks before undergoing this procedure as it will adversely affect your outcome. ADDITIONAL SURGERY NECESSARY (RE-OPERATIONS)  There are many variable conditions that may influence the long-term result of surgery. Should complications occur, additional surgery or other treatments may be necessary. Secondary surgery may be necessary to obtain optimal results. Risks and complications can occur with any surgery, the risks cited are particularly associated with abdominoplasty/panniculectomy. Other complications and risks can occur but are even more uncommon. The practice of medicine and surgery is not an exact science. There is no guarantee or warranty expressed or implied, on the results that may be obtained. In some situations, it may not be possible to achieve optimal results with a single surgical procedure or even a multiple procedure.        PATIENT COMPLIANCE   Follow all physician instructions carefully; this is essential for the success of your outcome. It is important that the surgical incisions are not subjected to excessive force, swelling, abrasion, or motion during the time of healing. Personal and vocational activity needs to be restricted. Protective dressings and drains should not be removed unless instructed by me. Successful post-operative function depends on both surgery and subsequent care. Physical activity that increases your pulse or heart rate may cause bruising, swelling, fluid accumulation and the need for return to surgery. It is wise to refrain from intimate physical activities after surgery until your physician states it is safe. It is important that you participate in follow-up care, return for aftercare, and promote your recovery after surgery. FINANCIAL RESPONSIBILITIES  The cost of surgery involves several charges for the services provided. The total includes fees charged by your surgeon, the cost of surgical supplies, anesthesia, laboratory tests, and possible hospital charges, depending on where the surgery is performed. Depending on whether the cost of surgery is covered by an insurance plan, you will be responsible for necessary co-payments, deductibles, and charges not covered. The fees charged for this procedure do not include any potential future costs for additional procedures that you elect to have or require in order to revise, optimize, or complete your outcome. Additional costs may occur should complications develop from the surgery. Secondary surgery or hospital day-surgery charges involved with revision surgery will also be your responsibility. HEALTH INSURANCE  Most health insurance companies exclude coverage for cosmetic surgical operations any complications that might occur from surgery.   Please carefully review your health insurance subscriber-information pamphlet or contact your insurance company for a detailed explanation of their policies for covering abdominoplasty/panniculectomy procedures. Most insurance plans may exclude coverage for secondary or revisionary surgery. ASPS consent abdominoplasty     GENERAL INFORMATION  Abdominoplasty is a surgical procedure to remove excess skin and fatty tissue from the middle and lower abdomen and to tighten muscles of the abdominal wall. Abdominoplasty is not a surgical treatment for being overweight. Weight changes will affect your body contouring results. You should not have body contouring until you have reached a stable weight. ALTERNATIVE TREATMENTS  Alternative forms of management consist of not treating the areas of loose skin and fatty deposits. Liposuction may be a surgical alternative to abdominoplasty if there is good skin tone and localized abdominal fatty deposits in an individual of normal weight. Diet and exercise programs may be of benefit in the overall reduction of excess body fat and contour improvement. Risks and potential complications are also associated with alternative surgical forms of treatment. INHERENT RISKS OF ABDOMINOPLASTY SURGERY  Every surgical procedure involves a certain amount of risk and it is important that you understand these risks and the possible complications associated with them. In addition, every procedure has limitations. An individual's choice to undergo a surgical procedure is based on the comparison of the risk to potential benefit. SPECIFIC RISKS OF ABDOMINOPLASTY SURGERY  Change in Skin Sensation: It is common to experience diminished (or loss) of skin sensation in areas that have had surgery. Diminished (or complete loss of skin sensation) may not totally resolve after an abdominoplasty. Skin Contour Irregularities:  Contour and shape irregularities and depressions may occur after abdominoplasty. Visible and palpable wrinkling of skin can occur.  Residual skin irregularities at the ends of the incisions or dog ears are always a possibility as is skin pleating when there is excessive redundant skin. This may improve with time, or it can be surgically corrected. Major Wound Separation:  Wounds may separate after surgery. Should this occur, additional treatment including surgery may be necessary. Umbilicus: Malposition, scarring, unacceptable appearance or loss of the umbilicus (navel) may occur. Pubic Distortion: It is possible, though unusual, for women to develop distortion of their labia and pubic area. Should this occur, additional treatment including surgery may be necessary. Use of Platelet Gel or Fibrin Sealants Tissue Glue:  Platelet Gel (from your blood) and Fibrin sealants (from heat-treated human blood components to  inactivate virus transmission) may be used to hold tissue layers together at surgery and to diminish postoperative bruising following an abdominoplasty. Sealants have been carefully produced from screened donor blood plasma for hepatitis, syphilis, and human immunodeficiency virus (HIV). These products  have been used safely for many years as sealants in cardiovascular and general surgery. This product is thought to be of help in diminishing surgical bleeding and by adhering layers of tissue together. GENERAL RISKS OF SURGERY  Healing Issues:  Certain medical conditions, dietary supplements and medications may delay and interfere with healing. Patients with massive weight loss may have a healing delay that could result in the incisions coming apart, infection, and tissue changes resulting in the need for additional medical care, surgery, and prolonged hospitalizations. Patients with diabetes or those taking medications such as steroids on an extended basis may have prolonged healing issues. Smoking will cause a delay in the healing process, often resulting in the need for additional surgery.  There are general risks associated with healing such as swelling, bleeding, possibility of additional surgery, prolonged recovery, color changes, shape changes, infection, not meeting your goals and expectations, and added expense to the patient. There may also be a longer recovery due to the length of surgery and anesthesia. Patients with significant skin laxity (patients seeking facelifts, breast lifts, abdominoplasty, and body lifts) will continue to have the same lax  skin after surgery. The quality or elasticity of skin will not change, and recurrence of skin looseness will occur at some time in the future, quicker for some than others. There are nerve endings that may become involved with healing scars from surgery such as suction-assisted lipectomy, abdominoplasty, facelifts, body lifts, and extremity surgery. While there may not be a major nerve injury, the small nerve endings during the healing period may become too active producing a painful or oversensitive area due to the small sensory nerve involved with scar tissue. Often, massage and early non-surgical intervention resolves this. It is important to discuss post-surgical pain with your surgeon. Bleeding: It is possible, to experience a bleeding episode during or after surgery. Should postoperative  bleeding occur, it may require emergency treatment to drain accumulated blood or you may  require a blood transfusion. Increased activity too soon after surgery  can lead to increased chance of bleeding and additional surgery. It is important to follow postoperative instructions and limit exercise and strenuous activity for the instructed time. Do not take any aspirin or anti-inflammatory medications for at least ten days before or after surgery, as this may increase the risk of bleeding. Non-prescription herbs and dietary supplements can increase the risk of surgical bleeding. Hematoma can occur at any time, usually in the first three weeks following injury to the operative area.  If blood transfusions are necessary to treat blood loss, there is the risk of blood-related infections such as hepatitis and HIV (AIDS). Heparin medications that are used to prevent blood clots in veins can produce bleeding and decreased blood platelets. Infection:  Infection can occur after surgery. Should an infection occur, additional treatment including antibiotics, hospitalization, or additional surgery may be necessary. It is important to tell your surgeon of any other infections, such as ingrown toenail, insect bite, or urinary tract infection. Remote infections, infection in other part of the body, may lead to an infection in the operated area. Scarring: All surgery leaves scars, some more visible than others. Although wound healing after a surgical  procedure is expected, abnormal scars may occur within the skin and deeper tissues. Scars may be unattractive and of different color than the surrounding skin tone. Scar appearance may also vary within the same scar. Scars may be asymmetrical (appear different on the right and left side of the body). There is the possibility of visible marks in the skin from sutures. In some cases, scars may require surgical revision or treatment. Firmness:  Excessive firmness can occur after surgery due to internal scarring. The occurrence of this is not  predictable. Additional treatment including surgery may be necessary. Change in Skin Sensation: It is common to experience diminished (or loss) of skin sensation in areas that have had surgery. Diminished (or complete loss of skin sensation) may not totally resolve. Skin Contour Irregularities:  Contour and shape irregularities may occur. Visible and palpable wrinkling of skin may occur. Residual  skin irregularities at the ends of the incisions or dog ears are always a possibility when there is  excessive redundant skin. This may improve with time, or it can be surgically corrected.   Skin Discoloration / Swelling:  Some bruising and swelling will normally occur. The skin in or near the surgical site can appear either lighter or darker than surrounding skin. Although uncommon, swelling and skin discoloration may persist for long periods of time and, in rare situations, may be permanent. Skin Sensitivity:  Itching, tenderness, or exaggerated responses to hot or cold temperatures may occur after surgery. Usually this resolves during healing, but in some situations it may be chronic. Major Wound Separation:  Wounds may separate after surgery. Should this occur, additional treatment including surgery may be necessary. Sutures:  Most surgical techniques use deep sutures. You may notice these sutures after your surgery. Sutures may spontaneously poke through the skin, become visible or produce irritation that requires suture removal.  Delayed Healing:  Wound disruption or delayed wound healing is possible. Some areas of the skin may not heal normally and may take a long time to heal. Areas of skin may die. This may require frequent dressing changes or further surgery to remove the non-healed tissue. Individuals who have decreased blood supply to tissue from past surgery or radiation therapy may be at increased risk for wound healing and poor surgical outcome. Smokers have a greater risk of skin loss and wound healing complications. Damage to Deeper Structures: There is the potential for injury to deeper structures including nerves, blood vessels, muscles, and lungs (pneumothorax) during any surgical procedure. The potential for this to occur varies according to the type of procedure being performed. Injury to deeper structures may be temporary or permanent. Fat Necrosis:  Fatty tissue found deep in the skin might die. This may produce areas of firmness within the skin. Additional surgery to remove areas of fat necrosis may be necessary. There is the possibility of contour irregularities in the skin that may result from fat necrosis.   Seroma:  Fluid may accumulate between the skin and the underlying tissues following surgery, trauma  or vigorous exercise. Should this problem occur, it may require additional procedures for drainage of fluid. Surgical Anesthesia:  Both local and general anesthesia involves risk. There is the possibility of complications, injury, and even death from all forms of surgical anesthesia or sedation. Shock: In rare circumstances, your surgical procedure can cause severe trauma, particularly when multiple or extensive procedures are performed. Although serious complications can occur infections or excessive fluid loss can lead to severe illness and even death. If surgical shock occurs, hospitalization and additional treatment would be necessary. Pain:  You will experience pain after your surgery. Pain of varying intensity and duration may occur and persist after surgery. Chronic pain may occur very infrequently from nerves becoming trapped in scar tissue or due to tissue stretching. Cardiac and Pulmonary Complications:  Pulmonary complications may occur secondarily to blood clots (pulmonary emboli), fat deposits (fat emboli) or partial collapse of the lungs after general anesthesia. Pulmonary emboli can be life threatening or fatal in some circumstances. Inactivity and other conditions may increase the incidence of blood clots traveling to the lungs causing a major blood clot that may result in death. It is important to discuss if you have any past history of swelling in your legs or blood clots that may contribute to this condition. Cardiac complications are a risk with any surgery and anesthesia, even in patients without symptoms. If you experience shortness of breath, chest pains, or unusual heart beats, seek medical attention immediately. Should any of these complications occur, you may require hospitalization and additional treatment. Venous Thrombosis and Sequelae:   Thrombosed veins, which resemble cords, occasionally develop in the area of the breast or around IV sites, and usually resolve without medical or surgical treatment. It is important to discuss with me surgeon any birth control pills you are taking. Certain high estrogen pills may increase your risk of thrombosed veins. Allergic Reactions:  In Some cases, local allergies to tape, suture material and glues, blood products, topical preparations or injected agents have been reported. Serious systemic reactions including shock (anaphylaxis) may occur in response to drugs used during surgery and prescription medicines. Allergic reactions may require additional treatment. Drug Reactions:  Unexpected drug allergies, lack of proper response to medication, or illness caused by the prescribed drug are possibilities. It is important for you to inform your physician of any problems you have had with any medication or allergies to medication, prescribed or over the counter, as well as medications you now regularly take. Asymmetry:  Symmetrical body appearance may not result after surgery. Factors such as skin tone, fatty deposits, skeletal prominence, and muscle tone may contribute to normal asymmetry in body features. Most patients have differences between the right and left side of their bodies before any surgery is performed. Additional surgery may be necessary to attempt to diminish asymmetry. Surgical Wetting Solutions: There is the possibility that large volumes of fluid containing dilute local anesthetic drugs and epinephrine that is injected into fatty deposits during surgery may contribute to fluid overload or systemic reaction to these medications. Additional treatment including hospitalization may be necessary. Persistent Swelling (Lymphedema):  Persistent swelling can occur following surgery. Unsatisfactory Result:  Although results are expected, there is no guarantee or warranty expressed or implied, on the  results that may be obtained.  The body is not asymmetric and almost everyone has some degree of unevenness which may not be recognized in advance. One side of the face may be slightly larger, one side of the face droopier. The breast and trunk area exhibit the same possibilities. Many of such issues cannot be fully corrected with surgery. The more realistic your expectations as to results, the better your results will be in your eye. Some patients never achieve their desired goals or results, at no fault of the surgeon or surgery. You may be disappointed with the results of surgery. Asymmetry, unanticipated shape and size, loss of function, wound disruption, poor healing, and loss of sensation may occur after surgery. Size may be incorrect. Unsatisfactory surgical scar location or appearance may occur. It may be  necessary to perform additional surgery to improve your results. ADDITIONAL ADVISORIES  Smoking, Second-Hand Smoke Exposure, Nicotine Products (Patch, Gum, Nasal Spray):  Patients who are currently smoking or use tobacco or nicotine products (patch, gum, or nasal spray) are at a greater risk for significant surgical complications of skin dying and delayed healing and additional scarring. Individuals exposed to second-hand smoke are also at potential risk for similar complications attributable to nicotine exposure. Additionally, smoking may have a significant negative effect on anesthesia and recovery from anesthesia, with coughing and possibly increased bleeding. Individuals who are not exposed to tobacco smoke or nicotine-containing products have a significantly lower risk of   this type of complication. I acknowledge that I will inform my physician if I continue to smoke within this time frame, and understand that for my safety, the surgery, if possible, may be delayed. Smoking may have such a negative effect on your surgery that a urine test just before surgery may be done which will prove the presence of Nicotine.  If positive, your surgery may be cancelled and your surgery,   Sleep Apnea / CPAP:   Individuals who have breathing disorders such as Obstructive Sleep Apnea and who may rely upon CPAP devices (constant positive airway pressure) or utilize nighttime oxygen are advised that they are at a substantive risk for respiratory arrest and death when they take narcotic pain medications following surgery. This is an important consideration when evaluating the safety of surgical procedures in terms of very serious complications, including death, that relate to pre-existing medical conditions. Surgery may be considered only with monitoring afterwards in a hospital setting in order to reduce risk of potential respiratory complications and to safely manage pain following surgery. Medications and Herbal Dietary Supplements: There are potential adverse reactions that occur as the result of taking over the counter, herbal, and/or prescription medications. Aspirin and medications that contain aspirin interfere with bleeding. These include non-steroidal anti-inflammatories such as Motrin, Advil, and Aleve. It is very important not to stop drugs that interfere with platelets, such as Plavix, which is used after a stent. It is important if you have had a stent and are taking Plavix that you inform the plastic surgeon. Stopping Plavix may result in a heart attack, stroke and even death. Be sure to check with your prescribing physician prior to stopping any medications. You may have drug interactions that may exist with medications which you are already taking. If you have an adverse reaction, stop the drugs immediately and call for instructions. If the reaction is severe, go immediately to the nearest emergency room. When taking the prescribed pain medications after surgery, realize that they can affect your thought process and coordination.  Do not drive, do not operate complex equipment, do not make any important decisions and do not drink any alcohol while taking these medications. Be sure to take your prescribed medication only as directed. Sun Exposure  Direct or Tanning Salon:  The effects of the sun are damaging to the skin. Exposing the treated areas to sun may result in  increased scarring, color changes, and poor healing. Patients who tan, either outdoors or in a salon, should inform their surgeon and either delay treatment, or avoid tanning until the you are told that it is safe to resume. The damaging effect of sun exposure occurs even with the use sun block or clothing coverage. Travel Plans:  Any surgery holds the risk of complications that may delay healing and your return to normal life. Please let the surgeon know of any travel plans, important commitments already scheduled or planned, or time demands that are important to you, so that appropriate timing of surgery can occur. There are no guarantees that you will be able to resume all activities in the desired time frame. Long-Term Results:  Subsequent alterations in the appearance of your body may occur as the result of aging, sun exposure, weight loss, weight gain, pregnancy, menopause or other circumstances not related to your surgery. Body-Piercing Procedures:  Individuals who currently wear body-piercing jewelry in the surgical region are advised that an infection could develop from this activity. Pregnancy will affect her results. Intimate Relations After Surgery:  Surgery involves coagulating of blood vessels and increased activity of any kind may open these vessels leading to a bleed, or hematoma. Activity that increases your pulse or heart rate may cause additional bruising, swelling, and the need for return to surgery to control bleeding. It is wise to refrain from intimate physical activities until your physician states it is safe. Mental Health Disorders and Elective Surgery:    It is important that all patients seeking to undergo elective surgery have realistic expectations that focus on improvement rather than perfection. Complications or less than satisfactory results are sometimes unavoidable, may require additional surgery and often are stressful. lthough many individuals may benefit psychologically from the results of elective  surgery, effects on mental health cannot be accurately predicted. DVT/PE Risks and Advisory: There is a risk of blood clots, Deep Vein Thrombosis (DVT) and Pulmonary Embolus (PE) with every surgical procedure. It varies with the risk factors below. The higher the risk factors, the greater the risk and the more involved you must be in both understanding these risks and, when permitted by your physician, walking and moving your legs. There may also be leg stockings, squeezing active leg devices, and possibly medicines to help lower your risk. There are many conditions that may increase or affect risks of clotting. Inform your doctor about any past or present history of any of the following:  Past History of Blood Clots  Family History of Blood Clots  Birth Control Pills  Swollen Legs  History of Cancer  Large Dose Vitamins  Varicose Veins  Past Illnesses of the Heart, Liver, Lung, or Gastrointestinal Tract. I understand the risks relating to DVT/PE and how important it is to comply with therapy as  discussed with my surgeon. The methods of preventative therapy include:  Early ambulation when allowed  Compression devices (SCD/ICD)  ASA protocol when allowed (Aspirin)  Heparin protocol when allowed  Enoxaparin protocol when allowed  The risks of DVT/PE may be almost as great as the prophylactic therapy when involving Aspirin, Heparin,  and Exoxaparin. Be aware that if your surgery is elective, those patients with very high risks should consider not proceeding with such elective surgery. ADDITIONAL SURGERY NECESSARY (Re-Operations)  There are many variable conditions that may influence the long-term result of surgery.  It is unknown how your tissue may respond or how wound healing will occur after surgery. Secondary surgery may be necessary to perform additional tightening or repositioning of body structures. Should complications occur, additional surgery or other treatments may be necessary. Even though risks and complications occur infrequently, the risks cited are particularly associated with this surgery. Other complications and risks can occur but are even more uncommon. The practice of medicine and surgery is not an exact science. Although good results are expected, there is no guarantee or warranty expressed or implied, on the results that may be obtained. In some situations, it may not be possible to achieve optimal results with a single surgical procedure. You and your surgeon will discuss the options available should  additional surgery be advised. There may be additional costs and expenses for such additional  procedures, including surgical fees, facility and anesthesia fees, pathology and lab testing. PATIENT COMPLIANCE  Follow all physician instructions carefully; this is essential for the success of your outcome. It is important that the surgical incisions are not subjected to excessive force, swelling, abrasion, or motion during the time of healing. Personal and vocational activity needs to be restricted. Protective dressings and drains should not be removed unless instructed. Successful post-operative function depends on both surgery and subsequent care. Physical activity that increases your pulse or heart rate may cause bruising, swelling, fluid accumulation and the need for return to surgery. It is wise to refrain from intimate physical activities after surgery until your physician states it is safe. It is important that you participate in follow-up care, return for aftercare, and promote your recovery after surgery.            Electronically signed by:  Myrtle Smiley MD 7/21/2021

## 2021-10-05 NOTE — PRE-PROCEDURE INSTRUCTIONS
ARR[facility] AT [facility] ON Tuesday, October 19,2021 at 09:00 AM    Once you enter the hospital lobby, take the elevators to the second floor. Check-In is at the surgery registration desk. Continue to take your home medications as you normally do up to and including the night before surgery with the exception of any blood thinning medications. Please stop any blood thinning medications as directed by your surgeon or prescribing physician. Failure to stop certain medications may interfere with your scheduled surgery. These may include:  Aspirin, Warfarin (Coumadin), Clopidogrel (Plavix), Ibuprofen (Motrin, Advil), Naproxen (Aleve), Meloxicam (Mobic), Celecoxib (Celebrex), Eliquis, Pradaxa, Xarelto, Effient, Fish Oil, Herbal supplements. Stop aspirin 7 days before surgery      Please take the following medication(s) the day of surgery with a small sip of water:  Abilify,cogentin,buspirone if needed          PREPARING FOR YOUR SURGERY:     Before surgery, you can play an important role in your own health. Because skin is not sterile, we need to be sure that your skin is as free of germs as possible before surgery by carefully washing before surgery. Preparing or prepping skin before surgery can reduce the risk of a surgical site infection.   Do not shave the area of your body where your surgery will be performed unless you received specific permission from your physician. You will need to shower at home the night before surgery and the morning of surgery with a special soap called chlorhexidine gluconate (CHG*). *Not to be used by people allergic to Chlorhexidine Gluconate (CHG). Following these instructions will help you be sure that your skin is clean before surgery. Instructions on cleaning your skin before surgery: The night before your surgery:      You will need to shower with warm water (not hot) and the CHG soap.  Use a clean wash cloth and a clean towel.   Have clean surgeries)   Please wear loose, comfortable clothing. If you are potentially going to have a cast or brace bring clothing that will fit over them.  In case of illness - If you have cold or flu like symptoms (high fever, runny nose, sore throat, cough, etc.) rash, nausea, vomiting, loose stools, and/or recent contact with someone who has a contagious disease (chicken pox, measles, etc.) Please call your doctor before coming to the hospital.         Day of Surgery/Procedure:    As a patient at [facility] you can expect quality medical and nursing care that is centered on your individual needs. Our goal is to make your surgical experience as comfortable as possible    . Transportation After Your Surgery/Procedure: You will need a friend or family member to drive you home after your procedure. Your  must be 25years of age or older and able to sign off on your discharge instructions. A taxi cab or any other form of public transportation is not acceptable. Your friend or family member must stay at the hospital throughout your procedure. Someone must remain with you for the first 24 hours after your surgery if you receive anesthesia or medication. If you do not have someone to stay with you, your procedure may be cancelled.       If you have any other questions regarding your procedure or the day of surgery, please call [phone]      _________________________  ____________________________  Signature (Patient)              Signature (Provider) & date

## 2021-10-05 NOTE — H&P
History and Physical Service   [facility] [place] - INPATIENT    HISTORY AND PHYSICAL EXAMINATION            Date of Evaluation: 10/5/2021  Patient name:  Joshua Sullivan  MRN:   [id number]  YOB: 1975  PCP:    ALEISHA Banks CNP    History Obtained From:     Patient, medical records    History of Present Illness: This is Joshua Sullivan a 48 y.o. female who presents for a pre-admission testing appointment for an upcoming panniculectomy and abdominoplasty, tap block, incisional wound vac, and biopatch by Dr. Kaleb Perales scheduled on 10/19/2021 at 0900 due to diastasis rectus, symptomatic abdominal pannus. Patient has a weight loss of approximately 100lbs about 7 years ago. Patient has complaints of excessive skin interfering with completing ADLs. Patient has chronic redness and pain under abdominal pannus. Patient uses dry pads underneath skin to attempt to keep skin dry. Patient's stomach is between her legs and has difficulty wearing clothing. Functional Capacity per pt:   1) Pt is able to walk 2 city blocks on level ground without SOB. 2) Pt is able to climb 2 flights of stairs without SOB. 3) Pt is able to walk up a hill for 1-2 city blocks without SOB. Past Medical History:     Past Medical History:   Diagnosis Date    Bell's palsy     Bipolar 1 disorder, depressed ([facility] [address] 2018    GERD (gastroesophageal reflux disease)     HTN (hypertension)     Iron (Fe) deficiency anemia     Obesity     Post traumatic stress disorder     Post-nasal drip     Seasonal allergies         Past Surgical History:     Past Surgical History:   Procedure Laterality Date    BREAST REDUCTION SURGERY       SECTION      Twice    NASAL SINUS SURGERY          Medications Prior to Admission:     Prior to Admission medications    Medication Sig Start Date End Date Taking?  Authorizing Provider   aspirin 81 MG EC tablet Take 81 mg by mouth daily   Yes Historical Provider, MD Pseudoephedrine HCl (SUDAFED 24 HOUR PO) Take 1 tablet by mouth daily   Yes Historical Provider, MD   lisinopril (PRINIVIL;ZESTRIL) 40 MG tablet take 1 tablet by mouth once daily 7/29/21   ALEISHA Watters CNP   lidocaine (XYLOCAINE) 5 % ointment Apply topically as needed. 6/8/21   ALEISHA Valdez CNP   polyethyl glycol-propyl glycol 0.4-0.3 % (SYSTANE) 0.4-0.3 % ophthalmic solution Place 1 drop into both eyes as needed for Dry Eyes 4/20/21   Clint Maxwell, DO   triamterene-hydroCHLOROthiazide [facility]) 37.5-25 MG per tablet take 1 tablet by mouth once daily 4/20/21   Adityae Linea, DO   clotrimazole-betamethasone (LOTRISONE) 1-0.05 % cream Apply topically 2 times daily. 2/8/21   Clint Maxwell, DO   fluticasone [facility]) 50 MCG/ACT nasal spray instill 1 spray into each nostril once daily 1/19/21   Adityae Linea, DO   clotrimazole-betamethasone (LOTRISONE) 1-0.05 % lotion apply topically twice a day 1/19/21   Adityae Linea, DO   Adapalene-Benzoyl Peroxide 0.1-2.5 % GEL daily 10/13/20   Adityae Linea, DO   ARIPiprazole (ABILIFY) 20 MG tablet take 1 tablet by mouth every morning 9/21/19   Historical Provider, MD   busPIRone (BUSPAR) 15 MG tablet take 1 tablet by mouth twice a day 9/21/19   Historical Provider, MD   benztropine (COGENTIN) 2 MG tablet take 1 tablet by mouth twice a day if needed 9/21/19   Historical Provider, MD        Allergies:     Celebrex [celecoxib], Pcn [penicillins], and Trazodone and nefazodone    Social History:     Tobacco:    reports that she quit smoking about 26 years ago. She has never used smokeless tobacco.  Alcohol:      reports current alcohol use. Drug Use:  reports no history of drug use.     Family History:     Family History   Problem Relation Age of Onset    High Blood Pressure Mother     Coronary Art Dis Mother     Thyroid Disease Mother     Depression Mother     High Blood Pressure Father     Thyroid Disease Father     Depression Father Review of Systems:     Positive and Negative as described in HPI. CONSTITUTIONAL: Negative for fevers, chills, sweats, fatigue, and weight loss. HEENT: Runny nose with allergies. Negative for glasses, hearing changes, and throat pain. RESPIRATORY: Negative for shortness of breath, cough, congestion, and wheezing. CARDIOVASCULAR: Negative for chest pain, blood clot, irregular heartbeat, and palpitations. GASTROINTESTINAL: GERD - depending on meals. Occasional constipation. Negative for nausea, vomiting, diarrhea, change in bowel habits, and abdominal pain. GENITOURINARY: Negative for difficulty of urination, burning with urination, and frequency. INTEGUMENT: See HPI. Easy bruising with ASA/Motrin. Negative for skin lesions. HEMATOLOGIC/LYMPHATIC: Negative for swelling/edema. ALLERGIC/IMMUNOLOGIC: Left upper arm itching. Negative for urticaria. ENDOCRINE: Intolerance to cold. Negative for increase in thirst, increase in urination, and heat intolerance. MUSCULOSKELETAL: Occasional joint pains. Negative muscle aches, and swelling of joints. NEUROLOGICAL: Negative for headaches, dizziness, lightheadedness, numbness, and tingling extremities. BEHAVIOR/PSYCH: Anxiety. PTSD - \"putting in IVs in lower arm are a trigger. \" Negative for depression. Physical Exam:   /74   Pulse 70   Temp 97.6 °F (36.4 °C) (Oral)   Resp 16   Ht 5' 4\" (1.626 m)   Wt 165 lb 5.5 oz (75 kg)   SpO2 99%   BMI 28.38 kg/m²   No LMP recorded. Patient is perimenopausal.  [id number]  No results for input(s): POCGLU in the last 72 hours. General Appearance:  Alert, well appearing, and in no acute distress. Mental status:  Oriented to person, place, and time. Head:  Normocephalic and atraumatic. Eye:  No icterus, redness, pupils equal and reactive, extraocular eye movements intact, and conjunctiva clear. Ear:  Hearing grossly intact. Nose:  No drainage noted.   Mouth:  Mucous membranes moist.  Neck:  Supple and no carotid bruits noted. Lungs:  Bilateral equal air entry, clear to auscultation, no wheezing, rales or rhonchi, and normal effort. Cardiovascular:  Normal rate, regular rhythm, no murmur, gallop, or rub. Abdomen:  Soft, nontender, nondistended, and active bowel sounds. Neurologic:  Normal speech and cranial nerves II through XII grossly intact. Strength 5/5 bilaterally. Skin:  No gross lesions, rashes, bruising, or bleeding on exposed skin area. Extremities:  Posterior tibial pulses 2+ bilaterally. No pedal edema. No calf tenderness with palpation. Psych: Normal affect. Investigations:      Laboratory Testing:  Recent Results (from the past 24 hour(s))   CBC    Collection Time: 10/05/21 10:19 AM   Result Value Ref Range    WBC 5.4 3.5 - 11.3 k/uL    RBC 4.05 3.95 - 5.11 m/uL    Hemoglobin 12.9 11.9 - 15.1 g/dL    Hematocrit 39.0 36.3 - 47.1 %    MCV 96.3 82.6 - 102.9 fL    MCH 31.9 25.2 - 33.5 pg    MCHC 33.1 28.4 - 34.8 g/dL    RDW 14.2 11.8 - 14.4 %    Platelets 195 564 - 345 k/uL    MPV 9.7 8.1 - 13.5 fL    NRBC Automated 0.0 0.0 per 100 WBC   BUN & Creatinine    Collection Time: 10/05/21 10:19 AM   Result Value Ref Range    BUN 10 6 - 20 mg/dL    CREATININE 0.64 0.50 - 0.90 mg/dL    GFR Non-African American >60 >60 mL/min    GFR African American >60 >60 mL/min    GFR Comment          GFR Staging NOT REPORTED    Electrolyte Panel    Collection Time: 10/05/21 10:19 AM   Result Value Ref Range    Sodium 138 135 - 144 mmol/L    Potassium 4.0 3.7 - 5.3 mmol/L    Chloride 101 98 - 107 mmol/L    CO2 25 20 - 31 mmol/L    Anion Gap 12 9 - 17 mmol/L       Recent Labs     10/05/21  1019   HGB 12.9   HCT 39.0   WBC 5.4   MCV 96.3      K 4.0      CO2 25   BUN 10   CREATININE 0.64       No results for input(s): COVID19 in the last 720 hours. Imaging/Diagnostics:    No results found. EKG: 10/5/2021: See Epic. Diagnosis:      1. Diastasis rectus, symptomatic abdominal pannus    Plans:     1. Panniculectomy and abdominoplasty, tap block, incisional wound vac, biopatch      Jocelin Jones, APRN - CNP  10/5/2021  11:12 AM    Beny Barba MD   Physician   Specialty:  Plastic Surgery   Progress Notes      Signed   Encounter Date:  7/21/2021               Signed        Expand AllCollapse All     Show:Clear all  [x]Manual[x]Template[x]Copied    Added by:  Ita Lee MD    []Hover for details    [address]  [facility] [id number]         History and Physical           Chief Complaint   Patient presents with    New Patient       panniculectomy          HPI:   Andrew Orozco is a 48 y.o. female who presents with symptomatic panniculectomy. Patient has lost approximately 100 pounds through diet and exercise. Patient's initial weight was 256 pounds. Patient's weight has been stable for 7 years. Patient has excess skin in her abdominal region that interferes with her daily activities and her quality of life. Patient has trouble cleaning her feet. The pannus has an odor to it. It interferes with additional exercises. It interferes with intercourse. It is painful and pulls on the abdominal skin. It prevents her close from not fitting well. A difference with her urination. Patient is here to discuss her options. Patient also continually gets rashes and has performed multiple treatments but they consist generally recur. .     Medications:      Current Facility-Administered Medications          Current Outpatient Medications   Medication Sig Dispense Refill    lidocaine (XYLOCAINE) 5 % ointment Apply topically as needed.  1 Tube 1    fexofenadine (ALLEGRA) 180 MG tablet Take 180 mg by mouth daily        polyethyl glycol-propyl glycol 0.4-0.3 % (SYSTANE) 0.4-0.3 % ophthalmic solution Place 1 drop into both eyes as needed for Dry Eyes 1 Bottle 0    triamterene-hydroCHLOROthiazide (MAXZIDE-25) 37.5-25 MG per tablet take 1 tablet by mouth once daily 90 tablet 1    lisinopril (PRINIVIL;ZESTRIL) 40 MG tablet take 1 tablet by mouth once daily 90 tablet 0    clotrimazole-betamethasone (LOTRISONE) 1-0.05 % cream Apply topically 2 times daily. 45 g 0    fluticasone (FLONASE) 50 MCG/ACT nasal spray instill 1 spray into each nostril once daily 32 g 2    clotrimazole-betamethasone (LOTRISONE) 1-0.05 % lotion apply topically twice a day 30 mL 1    Adapalene-Benzoyl Peroxide 0.1-2.5 % GEL daily 45 g 2    ARIPiprazole (ABILIFY) 20 MG tablet take 1 tablet by mouth every morning   0    busPIRone (BUSPAR) 15 MG tablet take 1 tablet by mouth twice a day   0    benztropine (COGENTIN) 2 MG tablet take 1 tablet by mouth twice a day if needed   0      No current facility-administered medications for this visit. Allergies: Allergies   Allergen Reactions    Celebrex [Celecoxib] Shortness Of Breath and Swelling    Pcn [Penicillins]         c/o yeast infection     Trazodone And Nefazodone         Suicidal thoughts      Review of Systems:   Constitutional: Negative for fever, chills, fatigue and unexpected weight change. HENT: Patient has postnasal drip. Patient has seasonal allergies. Eyes: Negative for pain and discharge. Respiratory: Negative for cough and shortness of breath. Cardiovascular: Patient has hypertension. Gastrointestinal: Patient has a history of GERD. Skin: Negative for pallor and rash. Neurological: Patient has a history of Bell's palsy. Hematological: Patient has anemia which is consistent with iron deficiency. Psychiatric/Behavioral: Negative for behavioral problems. Patient has posttraumatic stress syndrome. Patient has bipolar disorder. .       Past Medical History        Past Medical History:   Diagnosis Date    Bell's palsy      [facility] ([facility]12[facility]    GERD (gastroesophageal reflux disease)      HTN (hypertension)      Iron (Fe) deficiency anemia      Obesity  Post traumatic stress disorder      Post-nasal drip      Seasonal allergies           Past Surgical History         Past Surgical History:   Procedure Laterality Date    BREAST REDUCTION SURGERY         SECTION         Twice    NASAL SINUS SURGERY             Social History               Socioeconomic History    Marital status:        Spouse name: Not on file    Number of children: Not on file    Years of education: Not on file    Highest education level: Not on file   Occupational History    Not on file   Tobacco Use    Smoking status: Former Smoker       Quit date: 1995       Years since quittin.5    Smokeless tobacco: Never Used   Vaping Use    Vaping Use: Never used   Substance and Sexual Activity    Alcohol use: Yes       Comment: occational wine    Drug use: No    Sexual activity: Yes       Partners: Male       Birth control/protection: Surgical       Comment: TL    Other Topics Concern    Not on file   Social History Narrative    Not on file      Social Determinants of Health          Financial Resource Strain:     Difficulty of Paying Living Expenses:    Food Insecurity:     Worried About Running Out of Food in the Last Year:     Ran Out of Food in the Last Year:    Transportation Needs:     Lack of Transportation (Medical):      Lack of Transportation (Non-Medical):    Physical Activity:     Days of Exercise per Week:     Minutes of Exercise per Session:    Stress:     Feeling of Stress :    Social Connections:     Frequency of Communication with Friends and Family:     Frequency of Social Gatherings with Friends and Family:     Attends Confucianist Services:     Active Member of Clubs or Organizations:     Attends Club or Organization Meetings:     Marital Status:    Intimate Partner Violence:     Fear of Current or Ex-Partner:     Emotionally Abused:     Physically Abused:     Sexually Abused:          Family History         Family History Problem Relation Age of Onset    High Blood Pressure Mother      Coronary Art Dis Mother      Thyroid Disease Mother      Depression Mother      High Blood Pressure Father      Thyroid Disease Father      Depression Father           Physical Exam:   Resp 12   Ht 5' 1\" (1.549 m)   Wt 173 lb (78.5 kg)   BMI 32.69 kg/m²    Body mass index is 32.69 kg/m². Physical Exam   Nursing note and vitals reviewed. Constitutional: Oriented to person, place, and time. Appears well-developed and well-nourished. No distress. Head: Normocephalic and atraumatic. Eyes: Conjunctivae and EOM are normal.   Pulmonary/Chest: Effort normal. No respiratory distress. Neurological: Alert and oriented to person, place, and time. Skin: There is multiple stria present. Patient has a  scar. There is rashes present. Abdomen soft tender nondistended. There is rectus diastases present. Psychiatric: Normal mood and affect. Behavior is normal  PANNICULECTOMY HEALTH CHECKLIST:  Height:     Weight:      BMI: There is no height or weight on file to calculate BMI. Does your pannus affect your daily activities and quality of life? Yes x     No    How long: 10+ years   Which daily living activities are affected in the following ways? Cleaning of feet x   Odor    Interferes with exercise x   [place] x   Painful pulling of abdominal skin    Clothes not fitting            Urination x   Pulling of pubic hair    Seat belt not fitting    Other:      Back pain? Yes      No        Any Treatment? Yes      No        Any Testing? Yes      No   Where/What:      Have you had bariatric surgery? Yes      No x        Initial weight: 256 lb        Weight stable for how long? 7 years   Do you have a hernia? Yes      No x        Testing:  CT      Date/location:   Have you had a hernia repair in the past?  Yes      No x        Was mesh used? Yes      No x        Surgeon for hernia:     Any Rashes/Ulcers under folds of skin?   Yes x    No         Medications used:                                                        OTC Powder,                                                                                                RX \"lotrastome\"          Notes:  Also uses paper towels for rashes  Imaging:                                      Impression/Plan:        Diagnosis Orders   1. Diastasis of rectus abdominis      2. Symptomatic abdominal panniculus             Patient Active Problem List   Diagnosis    Seasonal allergies    HTN (hypertension)    Iron (Fe) deficiency anemia    Obesity    GERD (gastroesophageal reflux disease)    Bell's palsy    Post-nasal drip    Nasal obstruction    Nasal congestion    Raynaud's phenomenon without gangrene    History of low transverse  section    Bipolar 1 disorder, depressed ([address]    Post traumatic stress disorder    Primary insomnia    Excess skin of abdomen    Major depressive disorder, recurrent episode, mild (HCC)    Chronic pain of right knee    Right hip pain    Neck pain on right side    History of sinus surgery    Episodic overuse of medication ([address]         Plan:  Patient with symptomatic panniculus. Patient also has a rectus diastases. I discussed the differences between abdominoplasty and a panniculectomy. The risk of both procedures were discussed with patient in detail. All questions were answered. The following information was discussed with the Patient, but this is not an all-inclusive-other issues were also discussed with patient. I also discussed the following with the patient. I discussed loss of umbilicus. I discussed scars. I discussed dogears. I discussed wound healing. I discussed skin necrosis. I also discussed that status post bariatric surgery that the skin has stretched beyond its limited and there is still there to be elasticity and deformity. We discussed fat necrosis. We discussed postoperative seromas.   We discussed postoperative bleeding. Also discussed malposition of the umbilicus. GENERAL INFORMATION  Panniculectomy is a surgical procedure to remove excess skin and fatty tissue from the lower abdomen wall. Panniculectomy does not treat muscle laxity of the upper abdomen. Obese individuals who intend to lose weight should postpone all forms of body contouring surgery until they have reached a stable weight. There are a variety of different techniques used by plastic surgeons for panniculectomy. Panniculectomy can be combined with other forms of body-contouring surgery, including suction-assisted lipectomy, or performed at the same time with other elective surgeries. ALTERNATIVE TREATMENTS  Alternative forms of management consist of not treating the areas of loose skin and fatty deposits. Liposuction may be a surgical alternative to if there is good skin tone and localized abdominal fatty. If you have lost your skin laxity as apparent by multiple stretch marks you will not be a good liposuction candidate. Diet and exercise programs may be of benefit in the overall reduction of excess body fat and contour improvement. Risks and potential complications are associated with alternative surgical forms of treatment. RISKS OF PANNICULECTOMY SURGERY  Every surgical procedure involves a certain amount of risk and it is important that you understand these risks and the possible complications associated with them. In addition, every procedure has limitations. An individual's choice to undergo a surgical procedure is based on the comparison of the risk to potential benefit. Although the majority of patients do not experience these complications, you should discuss each of them with your plastic surgeon to make sure you completely understand all possible consequences of a abdominoplasty/panniculectomy. Bleeding- It is possible, though unusual, to experience a bleeding episode during or after surgery. There is blood in the pannus, and depending the size of your pannus you may be subjected to considerable blood loss. Should post-operative bleeding occur, it may require an emergency treatment to drain the accumulated blood or blood transfusion. Intra-operative blood transfusions may be required. Do not take any aspirin or anti-inflammatory medications for ten days before surgery, as this may increase the risk of bleeding. Non-prescription herbs and dietary supplements can increase the risk of surgical bleeding. Hematoma can occur at any time following injury. If blood transfusions are needed to treat blood loss, there is a risk of blood related infections such as hepatitis and the HIV (AIDS). Heparin medications that are used to prevent blood clots in veins can produce bleeding and decreased blood platelets. Infection - Infection is can occur after this surgery. Should an infection occur, treatment including antibiotics, hospitalization, or additional surgery may be necessary. There is a greater risk of infection when body contouring procedures are performed in conjunction with abdominal surgical procedures. Change in Skin Sensation- It is common to experience diminished (or loss) of skin sensation in areas that have had surgery. Diminished (or complete loss of skin sensation) may not totally resolve after an abdominoplasty/pannicular      Skin Contour Irregularities- Contour and shape irregularities and depressions may occur after abdominoplasty. Visible and palpable wrinkling of skin can occur. Residual skin irregularities at the ends of the incisions or dog ears are always a possibility as is skin pleating when there is excessive redundant skin. This may improve with time, or it can be surgically corrected. Major Wound Separation- Wounds may separate after surgery. Should this occur, additional treatment including surgery may be necessary.      Skin Discoloration / Swelling- Bruising and swelling normally occurs following panniculectomy. The skin in or near the surgical site can appear either lighter or darker than surrounding skin. Although uncommon, swelling and skin discoloration may persist for long periods of time and, in rare situations, may be permanent. Skin Sensitivity- Itching, tenderness, or exaggerated responses to hot or cold temperatures may occur after surgery. Usually this resolve during healing, but in some situations it may be chronic. Sutures- Most surgical techniques use deep sutures. You may notice these sutures after your surgery. Sutures may spontaneously poke through the skin, become visible or produce irritation that requires removal.     Damage to Deeper Structures- There is the potential for injury to deeper structures including nerves, blood vessels, muscles, and lungs (pneumothorax), or intra-abdominal injury can occure during any surgical procedure. The potential for this to occur varies according to the type of procedure being performed. Injury to deeper structures may be temporary or permanent. Fat Necrosis- Fatty tissue found deep in the skin might die. This may produce areas of firmness within the skin. Additional surgery to remove areas of fat necrosis may be necessary. There is the possibility of contour irregularities in the skin that may result from fat necrosis. Obesity: If you're BMI is greater than 30 you may have a higher chance of complications. This may include but not limited to wound healing and infections. Also if you have other medical problems such as diabetes and hypertension it may effect your healing as well as your surgical results in bleeding. Umbilicus- Malposition, scarring, unacceptable appearance or loss of the umbilicus (navel) may occur.   You may lose the umbilicus when this is combined with a hernia repairs, or due to the strech of the skin the umbilicus has stretched out so much that it can not be salvaged     Pubic Distortion- There will be distortion of their labia and pubic area. Additional treatment including surgery may be necessary. Even with additional surgery she may never have symmetry. At times you may have painful intercourse. Scarring-  All surgery leaves scars, some more visible than others. This surgery will leave large scars. Abnormal scars may occur within the skin and deeper tissues depending on your healing. Scars may be unattractive and of different color than surrounding skin. Scar appearance may also vary within the same scar, exhibit contour variations or \"bunching\" due to the amount of excess skin. Scars may be asymmetrical (appear different between right and left side of the body). There is the possibility of visible marks in the skin from sutures. In some cases scars may require surgical revision or treatment. Surgical Anesthesia- Both local and general anesthesia involve risk. There is the possibility of complications, injury, and even death from all forms of surgical anesthesia or sedation     Asymmetry-  Symmetrical body appearance may not result from panniculectomy. Factors such as skin tone, fatty deposits, skeletal prominence, and muscle tone may contribute to normal asymmetry in body features. Additional surgery may be necessary to attempt to attempt to improve asymmetry. Allergic Reactions- In some cases, local allergies to tape, suture material and glues, blood products, topical preparations or injected agents have been reported. Serious systemic reactions including shock (anaphylaxis) may occur to drugs used during surgery and prescription medications. Allergic reactions may require additional treatment. Delayed Healing- Wound disruption or delayed wound healing is possible. Some areas of the abdomen may not heal normally and may take a long time to heal.  Some areas of skin or tissue may die.   This may require frequent dressing changes or further surgery to remove the non-healed tissue. Smokers have a greater risk of skin loss and wound healing complications. Also patient with certain systemic disease or taking certain medications are at increased risk. Also infections under the pannus may effect your healing. Seroma- Fluid accumulations infrequently occur in between the skin and the abdominal wall. This may require additional procedures for drainage of fluid. Shock- In rare circumstances, your surgical procedure can cause severe trauma, particularly when multiple or extensive procedures are performed. Although serious complications are infrequent, infections or excessive fluid loss can lead to severe illness and even death. If surgical shock occurs, hospitalization and additional treatment would be necessary. Surgical Wetting Solutions-There is the possibility that large volumes of fluid containing dilute local anesthetic drugs and epinephrine that is injected into fatty deposits during surgery may contribute to fluid overload or systemic reaction to these medications. Additional treatment including hospitalization may be necessary. Persistent Swelling (Lymphedema)- Persistent swelling in the legs can occur following panniculectomy. Pain- You will experience pain after your surgery. Pain of varying intensity and duration may occur and persist after panniculectomy. Chronic pain may occur very  from nerves becoming trapped in scar tissue after panniculectomy. There may be numbness that can occur after a panniculectomy this could be temporary or permanent     Unsatisfactory Result- There is no guarantee or warranty expressed or implied, on the results that may be obtained. You may be disappointed with the results of panniculectomy surgery.   This would include risks such as asymmetry, unsatisfactory or highly visible surgical scar location, unacceptable visible deformities, bunching and rippling in the skin near the suture lines or at the ends of the incisions (dog ears), poor healing, wound disruption, and loss of sensation. It may not be possible to correct or improve the effects of surgical scars. In some situations, it may not be possible to achieve optimal results with a single surgical procedure. Additional surgery may be required to improve results. Deep Venous Thrombosis, Cardiac and Pulmonary Complications- Surgery, especially longer procedures, may be associated with the formation of, or increase in, blood clots in the venous system. Pulmonary complications may occur secondarily to both blood clots (pulmonary emboli), fat deposits (fat emboli) or partial collapse of the lungs after general anesthesia. Pulmonary and fat emboli can be life-threatening or fatal in some circumstances. Air travel, inactivity and other conditions may increase the incidence of blood clots traveling to the lungs causing a major blood clot that may result in death. It is important to discuss with your physician any past history of blood clots, swollen legs or the use of estrogen or birth control pills that may contribute to this condition. Cardiac complications are a risk with any surgery and anesthesia, even in patients without symptoms. Should any of these complications occur, you may require hospitalization and additional treatment. If you experience shortness of breath, chest pains, or unusual heart beats, seek medical attention immediately. ADDITIONAL ADVISORIES     Long-Term Results- Subsequent alterations in the appearance of your body may occur as the result of aging, sun exposure, weight loss, weight gain, pregnancy, menopause or other circumstances not related to your surgery. Metabolic Status of Massive Weight Loss Patients- Your personal metabolic status of blood chemistry and protein levels may be abnormal following massive weight loss and surgical procedures to make a patient loose weight.  Individuals with less than satisfactory results are sometimes unavoidable, may require additional surgery and often are stressful. Smoking, Second-Hand Smoke Exposure, Nicotine Products (Patch, Gum, Nasal Spray)-   Patients who are currently smoking, use tobacco products, or nicotine products (patch, gum, or nasal spray) are at a greater risk for significant surgical complications of skin dying, delayed healing, and additional scarring. Individuals exposed to second-hand smoke are also at potential risk for similar complications attributable to nicotine exposure. Additionally, smokers may have a significant negative effect on anesthesia and recovery from anesthesia, with coughing and possibly increased bleeding. Individuals who are not exposed to tobacco smoke or nicotine-containing products have a significantly lower risk of this type of complication. It is important to refrain from smoking at least 6-8 weeks before surgery and I do not smoke for 8 weeks to 3 months after surgery. Post-bariatric patients: It is imperative that quit smoking at least 6-8 weeks before undergoing this procedure as it will adversely affect your outcome. ADDITIONAL SURGERY NECESSARY (RE-OPERATIONS)  There are many variable conditions that may influence the long-term result of surgery. Should complications occur, additional surgery or other treatments may be necessary. Secondary surgery may be necessary to obtain optimal results. Risks and complications can occur with any surgery, the risks cited are particularly associated with abdominoplasty/panniculectomy. Other complications and risks can occur but are even more uncommon. The practice of medicine and surgery is not an exact science. There is no guarantee or warranty expressed or implied, on the results that may be obtained. In some situations, it may not be possible to achieve optimal results with a single surgical procedure or even a multiple procedure.        PATIENT COMPLIANCE   Follow all physician instructions carefully; this is essential for the success of your outcome. It is important that the surgical incisions are not subjected to excessive force, swelling, abrasion, or motion during the time of healing. Personal and vocational activity needs to be restricted. Protective dressings and drains should not be removed unless instructed by me. Successful post-operative function depends on both surgery and subsequent care. Physical activity that increases your pulse or heart rate may cause bruising, swelling, fluid accumulation and the need for return to surgery. It is wise to refrain from intimate physical activities after surgery until your physician states it is safe. It is important that you participate in follow-up care, return for aftercare, and promote your recovery after surgery. FINANCIAL RESPONSIBILITIES  The cost of surgery involves several charges for the services provided. The total includes fees charged by your surgeon, the cost of surgical supplies, anesthesia, laboratory tests, and possible hospital charges, depending on where the surgery is performed. Depending on whether the cost of surgery is covered by an insurance plan, you will be responsible for necessary co-payments, deductibles, and charges not covered. The fees charged for this procedure do not include any potential future costs for additional procedures that you elect to have or require in order to revise, optimize, or complete your outcome. Additional costs may occur should complications develop from the surgery. Secondary surgery or hospital day-surgery charges involved with revision surgery will also be your responsibility. HEALTH INSURANCE  Most health insurance companies exclude coverage for cosmetic surgical operations any complications that might occur from surgery.   Please carefully review your health insurance subscriber-information pamphlet or contact your insurance company for a detailed explanation of their policies for covering abdominoplasty/panniculectomy procedures. Most insurance plans may exclude coverage for secondary or revisionary surgery. ASPS consent abdominoplasty     GENERAL INFORMATION  Abdominoplasty is a surgical procedure to remove excess skin and fatty tissue from the middle and lower abdomen and to tighten muscles of the abdominal wall. Abdominoplasty is not a surgical treatment for being overweight. Weight changes will affect your body contouring results. You should not have body contouring until you have reached a stable weight. ALTERNATIVE TREATMENTS  Alternative forms of management consist of not treating the areas of loose skin and fatty deposits. Liposuction may be a surgical alternative to abdominoplasty if there is good skin tone and localized abdominal fatty deposits in an individual of normal weight. Diet and exercise programs may be of benefit in the overall reduction of excess body fat and contour improvement. Risks and potential complications are also associated with alternative surgical forms of treatment. INHERENT RISKS OF ABDOMINOPLASTY SURGERY  Every surgical procedure involves a certain amount of risk and it is important that you understand these risks and the possible complications associated with them. In addition, every procedure has limitations. An individual's choice to undergo a surgical procedure is based on the comparison of the risk to potential benefit. SPECIFIC RISKS OF ABDOMINOPLASTY SURGERY  Change in Skin Sensation: It is common to experience diminished (or loss) of skin sensation in areas that have had surgery. Diminished (or complete loss of skin sensation) may not totally resolve after an abdominoplasty. Skin Contour Irregularities:  Contour and shape irregularities and depressions may occur after abdominoplasty. Visible and palpable wrinkling of skin can occur.  Residual skin irregularities at the ends of the incisions or dog ears are always a possibility as is skin pleating when there is excessive redundant skin. This may improve with time, or it can be surgically corrected. Major Wound Separation:  Wounds may separate after surgery. Should this occur, additional treatment including surgery may be necessary. Umbilicus: Malposition, scarring, unacceptable appearance or loss of the umbilicus (navel) may occur. Pubic Distortion: It is possible, though unusual, for women to develop distortion of their labia and pubic area. Should this occur, additional treatment including surgery may be necessary. Use of Platelet Gel or Fibrin Sealants Tissue Glue:  Platelet Gel (from your blood) and Fibrin sealants (from heat-treated human blood components to  inactivate virus transmission) may be used to hold tissue layers together at surgery and to diminish postoperative bruising following an abdominoplasty. Sealants have been carefully produced from screened donor blood plasma for hepatitis, syphilis, and human immunodeficiency virus (HIV). These products  have been used safely for many years as sealants in cardiovascular and general surgery. This product is thought to be of help in diminishing surgical bleeding and by adhering layers of tissue together. GENERAL RISKS OF SURGERY  Healing Issues:  Certain medical conditions, dietary supplements and medications may delay and interfere with healing. Patients with massive weight loss may have a healing delay that could result in the incisions coming apart, infection, and tissue changes resulting in the need for additional medical care, surgery, and prolonged hospitalizations. Patients with diabetes or those taking medications such as steroids on an extended basis may have prolonged healing issues. Smoking will cause a delay in the healing process, often resulting in the need for additional surgery.  There are general risks associated with healing such as swelling, bleeding, possibility of additional surgery, prolonged recovery, color changes, shape changes, infection, not meeting your goals and expectations, and added expense to the patient. There may also be a longer recovery due to the length of surgery and anesthesia. Patients with significant skin laxity (patients seeking facelifts, breast lifts, abdominoplasty, and body lifts) will continue to have the same lax  skin after surgery. The quality or elasticity of skin will not change, and recurrence of skin looseness will occur at some time in the future, quicker for some than others. There are nerve endings that may become involved with healing scars from surgery such as suction-assisted lipectomy, abdominoplasty, facelifts, body lifts, and extremity surgery. While there may not be a major nerve injury, the small nerve endings during the healing period may become too active producing a painful or oversensitive area due to the small sensory nerve involved with scar tissue. Often, massage and early non-surgical intervention resolves this. It is important to discuss post-surgical pain with your surgeon. Bleeding: It is possible, to experience a bleeding episode during or after surgery. Should postoperative  bleeding occur, it may require emergency treatment to drain accumulated blood or you may  require a blood transfusion. Increased activity too soon after surgery  can lead to increased chance of bleeding and additional surgery. It is important to follow postoperative instructions and limit exercise and strenuous activity for the instructed time. Do not take any aspirin or anti-inflammatory medications for at least ten days before or after surgery, as this may increase the risk of bleeding. Non-prescription herbs and dietary supplements can increase the risk of surgical bleeding. Hematoma can occur at any time, usually in the first three weeks following injury to the operative area.  If blood transfusions are necessary to treat blood loss, there is the risk of blood-related infections such as hepatitis and HIV (AIDS). Heparin medications that are used to prevent blood clots in veins can produce bleeding and decreased blood platelets. Infection:  Infection can occur after surgery. Should an infection occur, additional treatment including antibiotics, hospitalization, or additional surgery may be necessary. It is important to tell your surgeon of any other infections, such as ingrown toenail, insect bite, or urinary tract infection. Remote infections, infection in other part of the body, may lead to an infection in the operated area. Scarring: All surgery leaves scars, some more visible than others. Although wound healing after a surgical  procedure is expected, abnormal scars may occur within the skin and deeper tissues. Scars may be unattractive and of different color than the surrounding skin tone. Scar appearance may also vary within the same scar. Scars may be asymmetrical (appear different on the right and left side of the body). There is the possibility of visible marks in the skin from sutures. In some cases, scars may require surgical revision or treatment. Firmness:  Excessive firmness can occur after surgery due to internal scarring. The occurrence of this is not  predictable. Additional treatment including surgery may be necessary. Change in Skin Sensation: It is common to experience diminished (or loss) of skin sensation in areas that have had surgery. Diminished (or complete loss of skin sensation) may not totally resolve. Skin Contour Irregularities:  Contour and shape irregularities may occur. Visible and palpable wrinkling of skin may occur. Residual  skin irregularities at the ends of the incisions or dog ears are always a possibility when there is  excessive redundant skin. This may improve with time, or it can be surgically corrected.   Skin Discoloration / Swelling:  Some bruising and swelling will normally occur. The skin in or near the surgical site can appear either lighter or darker than surrounding skin. Although uncommon, swelling and skin discoloration may persist for long periods of time and, in rare situations, may be permanent. Skin Sensitivity:  Itching, tenderness, or exaggerated responses to hot or cold temperatures may occur after surgery. Usually this resolves during healing, but in some situations it may be chronic. Major Wound Separation:  Wounds may separate after surgery. Should this occur, additional treatment including surgery may be necessary. Sutures:  Most surgical techniques use deep sutures. You may notice these sutures after your surgery. Sutures may spontaneously poke through the skin, become visible or produce irritation that requires suture removal.  Delayed Healing:  Wound disruption or delayed wound healing is possible. Some areas of the skin may not heal normally and may take a long time to heal. Areas of skin may die. This may require frequent dressing changes or further surgery to remove the non-healed tissue. Individuals who have decreased blood supply to tissue from past surgery or radiation therapy may be at increased risk for wound healing and poor surgical outcome. Smokers have a greater risk of skin loss and wound healing complications. Damage to Deeper Structures: There is the potential for injury to deeper structures including nerves, blood vessels, muscles, and lungs (pneumothorax) during any surgical procedure. The potential for this to occur varies according to the type of procedure being performed. Injury to deeper structures may be temporary or permanent. Fat Necrosis:  Fatty tissue found deep in the skin might die. This may produce areas of firmness within the skin. Additional surgery to remove areas of fat necrosis may be necessary. There is the possibility of contour irregularities in the skin that may result from fat necrosis.   Seroma:  Fluid may accumulate between the skin and the underlying tissues following surgery, trauma  or vigorous exercise. Should this problem occur, it may require additional procedures for drainage of fluid. Surgical Anesthesia:  Both local and general anesthesia involves risk. There is the possibility of complications, injury, and even death from all forms of surgical anesthesia or sedation. Shock: In rare circumstances, your surgical procedure can cause severe trauma, particularly when multiple or extensive procedures are performed. Although serious complications can occur infections or excessive fluid loss can lead to severe illness and even death. If surgical shock occurs, hospitalization and additional treatment would be necessary. Pain:  You will experience pain after your surgery. Pain of varying intensity and duration may occur and persist after surgery. Chronic pain may occur very infrequently from nerves becoming trapped in scar tissue or due to tissue stretching. Cardiac and Pulmonary Complications:  Pulmonary complications may occur secondarily to blood clots (pulmonary emboli), fat deposits (fat emboli) or partial collapse of the lungs after general anesthesia. Pulmonary emboli can be life threatening or fatal in some circumstances. Inactivity and other conditions may increase the incidence of blood clots traveling to the lungs causing a major blood clot that may result in death. It is important to discuss if you have any past history of swelling in your legs or blood clots that may contribute to this condition. Cardiac complications are a risk with any surgery and anesthesia, even in patients without symptoms. If you experience shortness of breath, chest pains, or unusual heart beats, seek medical attention immediately. Should any of these complications occur, you may require hospitalization and additional treatment. Venous Thrombosis and Sequelae:   Thrombosed veins, which resemble cords, occasionally develop in the area of the breast or around IV sites, and usually resolve without medical or surgical treatment. It is important to discuss with me surgeon any birth control pills you are taking. Certain high estrogen pills may increase your risk of thrombosed veins. Allergic Reactions:  In Some cases, local allergies to tape, suture material and glues, blood products, topical preparations or injected agents have been reported. Serious systemic reactions including shock (anaphylaxis) may occur in response to drugs used during surgery and prescription medicines. Allergic reactions may require additional treatment. Drug Reactions:  Unexpected drug allergies, lack of proper response to medication, or illness caused by the prescribed drug are possibilities. It is important for you to inform your physician of any problems you have had with any medication or allergies to medication, prescribed or over the counter, as well as medications you now regularly take. Asymmetry:  Symmetrical body appearance may not result after surgery. Factors such as skin tone, fatty deposits, skeletal prominence, and muscle tone may contribute to normal asymmetry in body features. Most patients have differences between the right and left side of their bodies before any surgery is performed. Additional surgery may be necessary to attempt to diminish asymmetry. Surgical Wetting Solutions: There is the possibility that large volumes of fluid containing dilute local anesthetic drugs and epinephrine that is injected into fatty deposits during surgery may contribute to fluid overload or systemic reaction to these medications. Additional treatment including hospitalization may be necessary. Persistent Swelling (Lymphedema):  Persistent swelling can occur following surgery. Unsatisfactory Result:  Although results are expected, there is no guarantee or warranty expressed or implied, on the  results that may be obtained.  The body is not asymmetric and almost everyone has some degree of unevenness which may not be recognized in advance. One side of the face may be slightly larger, one side of the face droopier. The breast and trunk area exhibit the same possibilities. Many of such issues cannot be fully corrected with surgery. The more realistic your expectations as to results, the better your results will be in your eye. Some patients never achieve their desired goals or results, at no fault of the surgeon or surgery. You may be disappointed with the results of surgery. Asymmetry, unanticipated shape and size, loss of function, wound disruption, poor healing, and loss of sensation may occur after surgery. Size may be incorrect. Unsatisfactory surgical scar location or appearance may occur. It may be  necessary to perform additional surgery to improve your results. ADDITIONAL ADVISORIES  Smoking, Second-Hand Smoke Exposure, Nicotine Products (Patch, Gum, Nasal Spray):  Patients who are currently smoking or use tobacco or nicotine products (patch, gum, or nasal spray) are at a greater risk for significant surgical complications of skin dying and delayed healing and additional scarring. Individuals exposed to second-hand smoke are also at potential risk for similar complications attributable to nicotine exposure. Additionally, smoking may have a significant negative effect on anesthesia and recovery from anesthesia, with coughing and possibly increased bleeding. Individuals who are not exposed to tobacco smoke or nicotine-containing products have a significantly lower risk of   this type of complication. I acknowledge that I will inform my physician if I continue to smoke within this time frame, and understand that for my safety, the surgery, if possible, may be delayed. Smoking may have such a negative effect on your surgery that a urine test just before surgery may be done which will prove the presence of Nicotine.  If positive, your surgery may be cancelled and your surgery,   Sleep Apnea / CPAP:   Individuals who have breathing disorders such as Obstructive Sleep Apnea and who may rely upon CPAP devices (constant positive airway pressure) or utilize nighttime oxygen are advised that they are at a substantive risk for respiratory arrest and death when they take narcotic pain medications following surgery. This is an important consideration when evaluating the safety of surgical procedures in terms of very serious complications, including death, that relate to pre-existing medical conditions. Surgery may be considered only with monitoring afterwards in a hospital setting in order to reduce risk of potential respiratory complications and to safely manage pain following surgery. Medications and Herbal Dietary Supplements: There are potential adverse reactions that occur as the result of taking over the counter, herbal, and/or prescription medications. Aspirin and medications that contain aspirin interfere with bleeding. These include non-steroidal anti-inflammatories such as Motrin, Advil, and Aleve. It is very important not to stop drugs that interfere with platelets, such as Plavix, which is used after a stent. It is important if you have had a stent and are taking Plavix that you inform the plastic surgeon. Stopping Plavix may result in a heart attack, stroke and even death. Be sure to check with your prescribing physician prior to stopping any medications. You may have drug interactions that may exist with medications which you are already taking. If you have an adverse reaction, stop the drugs immediately and call for instructions. If the reaction is severe, go immediately to the nearest emergency room. When taking the prescribed pain medications after surgery, realize that they can affect your thought process and coordination.  Do not drive, do not operate complex equipment, do not make any important decisions and do not drink any alcohol while taking these medications. Be sure to take your prescribed medication only as directed. Sun Exposure - Direct or Tanning Salon:  The effects of the sun are damaging to the skin. Exposing the treated areas to sun may result in  increased scarring, color changes, and poor healing. Patients who tan, either outdoors or in a salon, should inform their surgeon and either delay treatment, or avoid tanning until the you are told that it is safe to resume. The damaging effect of sun exposure occurs even with the use sun block or clothing coverage. Travel Plans:  Any surgery holds the risk of complications that may delay healing and your return to normal life. Please let the surgeon know of any travel plans, important commitments already scheduled or planned, or time demands that are important to you, so that appropriate timing of surgery can occur. There are no guarantees that you will be able to resume all activities in the desired time frame. Long-Term Results:  Subsequent alterations in the appearance of your body may occur as the result of aging, sun exposure, weight loss, weight gain, pregnancy, menopause or other circumstances not related to your surgery. Body-Piercing Procedures:  Individuals who currently wear body-piercing jewelry in the surgical region are advised that an infection could develop from this activity. Pregnancy will affect her results. Intimate Relations After Surgery:  Surgery involves coagulating of blood vessels and increased activity of any kind may open these vessels leading to a bleed, or hematoma. Activity that increases your pulse or heart rate may cause additional bruising, swelling, and the need for return to surgery to control bleeding. It is wise to refrain from intimate physical activities until your physician states it is safe. Mental Health Disorders and Elective Surgery:    It is important that all patients seeking to undergo elective surgery have realistic expectations that focus on improvement rather than perfection. Complications or less than satisfactory results are sometimes unavoidable, may require additional surgery and often are stressful. lthough many individuals may benefit psychologically from the results of elective  surgery, effects on mental health cannot be accurately predicted. DVT/PE Risks and Advisory: There is a risk of blood clots, Deep Vein Thrombosis (DVT) and Pulmonary Embolus (PE) with every surgical procedure. It varies with the risk factors below. The higher the risk factors, the greater the risk and the more involved you must be in both understanding these risks and, when permitted by your physician, walking and moving your legs. There may also be leg stockings, squeezing active leg devices, and possibly medicines to help lower your risk. There are many conditions that may increase or affect risks of clotting. Inform your doctor about any past or present history of any of the following:  Past History of Blood Clots  Family History of Blood Clots  Birth Control Pills  Swollen Legs  History of Cancer  Large Dose Vitamins  Varicose Veins  Past Illnesses of the Heart, Liver, Lung, or Gastrointestinal Tract. I understand the risks relating to DVT/PE and how important it is to comply with therapy as  discussed with my surgeon. The methods of preventative therapy include:  Early ambulation when allowed  Compression devices (SCD/ICD)  ASA protocol when allowed (Aspirin)  Heparin protocol when allowed  Enoxaparin protocol when allowed  The risks of DVT/PE may be almost as great as the prophylactic therapy when involving Aspirin, Heparin,  and Exoxaparin. Be aware that if your surgery is elective, those patients with very high risks should consider not proceeding with such elective surgery. ADDITIONAL SURGERY NECESSARY (Re-Operations)  There are many variable conditions that may influence the long-term result of surgery.  It is unknown how your tissue may respond or how wound healing will occur after surgery. Secondary surgery may be necessary to perform additional tightening or repositioning of body structures. Should complications occur, additional surgery or other treatments may be necessary. Even though risks and complications occur infrequently, the risks cited are particularly associated with this surgery. Other complications and risks can occur but are even more uncommon. The practice of medicine and surgery is not an exact science. Although good results are expected, there is no guarantee or warranty expressed or implied, on the results that may be obtained. In some situations, it may not be possible to achieve optimal results with a single surgical procedure. You and your surgeon will discuss the options available should  additional surgery be advised. There may be additional costs and expenses for such additional  procedures, including surgical fees, facility and anesthesia fees, pathology and lab testing. PATIENT COMPLIANCE  Follow all physician instructions carefully; this is essential for the success of your outcome. It is important that the surgical incisions are not subjected to excessive force, swelling, abrasion, or motion during the time of healing. Personal and vocational activity needs to be restricted. Protective dressings and drains should not be removed unless instructed. Successful post-operative function depends on both surgery and subsequent care. Physical activity that increases your pulse or heart rate may cause bruising, swelling, fluid accumulation and the need for return to surgery. It is wise to refrain from intimate physical activities after surgery until your physician states it is safe. It is important that you participate in follow-up care, return for aftercare, and promote your recovery after surgery.            Electronically signed by:  Bruce Perez MD 7/21/2021

## 2021-10-06 LAB
EKG ATRIAL RATE: 80 BPM
EKG P AXIS: 66 DEGREES
EKG P-R INTERVAL: 152 MS
EKG Q-T INTERVAL: 388 MS
EKG QRS DURATION: 84 MS
EKG QTC CALCULATION (BAZETT): 447 MS
EKG R AXIS: -12 DEGREES
EKG T AXIS: 35 DEGREES
EKG VENTRICULAR RATE: 80 BPM

## 2021-10-06 PROCEDURE — 93010 ELECTROCARDIOGRAM REPORT: CPT | Performed by: INTERNAL MEDICINE

## 2021-10-06 RX ORDER — FUROSEMIDE 20 MG/1
20 TABLET ORAL DAILY PRN
COMMUNITY
End: 2022-03-30 | Stop reason: SDUPTHER

## 2021-10-06 NOTE — PROGRESS NOTES
Visit Information    Have you changed or started any medications since your last visit including any over-the-counter medicines, vitamins, or herbal medicines? no   Are you having any side effects from any of your medications? -  no  Have you stopped taking any of your medications? Is so, why? -  no    Have you seen any other physician or provider since your last visit? No  Have you had any other diagnostic tests since your last visit? No  Have you been seen in the emergency room and/or had an admission to a hospital since we last saw you? No  Have you had your routine dental cleaning in the past 6 months? yes     Have you activated your [employer] account? If not, what are your barriers?  Yes     Patient Care Team:  ALEISHA Wall CNP as PCP - General (Family Nurse Practitioner)  Sanders Koyanagi, DO as PCP - [facility]  Abigail Cervantes DPM as Surgeon (Podiatry)  Yelitza Gresham MD (Emergency Medicine)  ALEISHA Sung CNP as Nurse Practitioner (Family Nurse Practitioner)    Medical History Review  Past Medical, Family, and Social History reviewed and does contribute to the patient presenting condition    Health Maintenance   Topic Date Due    Hepatitis C screen  Never done    Colon cancer screen colonoscopy  Never done    Annual Wellness Visit (AWV)  Never done    Flu vaccine (1) Never done    DTaP/Tdap/Td vaccine (1 - Tdap) 06/08/2022 (Originally 5/21/1994)    Potassium monitoring  10/05/2022    Creatinine monitoring  10/05/2022    Cervical cancer screen  12/03/2024    Lipid screen  04/12/2026    COVID-19 Vaccine  Completed    HIV screen  Completed    Hepatitis A vaccine  Aged Out    Hepatitis B vaccine  Aged Out    Hib vaccine  Aged Out    Meningococcal (ACWY) vaccine  Aged Out    Pneumococcal 0-64 years Vaccine  Aged Out

## 2021-10-07 ENCOUNTER — TELEMEDICINE (OUTPATIENT)
Dept: FAMILY MEDICINE CLINIC | Age: 46
End: 2021-10-07
Payer: COMMERCIAL

## 2021-10-07 DIAGNOSIS — K21.9 GASTROESOPHAGEAL REFLUX DISEASE, UNSPECIFIED WHETHER ESOPHAGITIS PRESENT: ICD-10-CM

## 2021-10-07 DIAGNOSIS — Z12.11 COLON CANCER SCREENING: ICD-10-CM

## 2021-10-07 DIAGNOSIS — Z74.2 NEED FOR HOME HEALTH CARE: ICD-10-CM

## 2021-10-07 DIAGNOSIS — F51.01 PRIMARY INSOMNIA: ICD-10-CM

## 2021-10-07 DIAGNOSIS — G89.29 CHRONIC PAIN OF RIGHT KNEE: ICD-10-CM

## 2021-10-07 DIAGNOSIS — F41.9 MODERATE ANXIETY: ICD-10-CM

## 2021-10-07 DIAGNOSIS — E78.5 DYSLIPIDEMIA: ICD-10-CM

## 2021-10-07 DIAGNOSIS — J30.89 NON-SEASONAL ALLERGIC RHINITIS DUE TO OTHER ALLERGIC TRIGGER: ICD-10-CM

## 2021-10-07 DIAGNOSIS — L98.7 EXCESS SKIN OF ABDOMEN: ICD-10-CM

## 2021-10-07 DIAGNOSIS — I10 ESSENTIAL HYPERTENSION: Primary | ICD-10-CM

## 2021-10-07 DIAGNOSIS — M25.551 RIGHT HIP PAIN: ICD-10-CM

## 2021-10-07 DIAGNOSIS — M25.561 CHRONIC PAIN OF RIGHT KNEE: ICD-10-CM

## 2021-10-07 DIAGNOSIS — F43.10 POST TRAUMATIC STRESS DISORDER: ICD-10-CM

## 2021-10-07 DIAGNOSIS — F33.1 MODERATE EPISODE OF RECURRENT MAJOR DEPRESSIVE DISORDER (HCC): ICD-10-CM

## 2021-10-07 DIAGNOSIS — M54.2 NECK PAIN ON RIGHT SIDE: ICD-10-CM

## 2021-10-07 PROBLEM — Z98.890 S/P BILATERAL BREAST REDUCTION: Status: ACTIVE | Noted: 2021-10-07

## 2021-10-07 PROCEDURE — 99215 OFFICE O/P EST HI 40 MIN: CPT | Performed by: FAMILY MEDICINE

## 2021-10-07 PROCEDURE — G8427 DOCREV CUR MEDS BY ELIG CLIN: HCPCS | Performed by: FAMILY MEDICINE

## 2021-10-07 RX ORDER — LIDOCAINE 50 MG/G
OINTMENT TOPICAL
Qty: 1 EACH | Refills: 2 | Status: SHIPPED | OUTPATIENT
Start: 2021-10-07 | End: 2021-10-07

## 2021-10-07 RX ORDER — LIDOCAINE 40 MG/G
CREAM TOPICAL
Qty: 1 EACH | Refills: 2 | Status: SHIPPED | OUTPATIENT
Start: 2021-10-07

## 2021-10-07 ASSESSMENT — PATIENT HEALTH QUESTIONNAIRE - PHQ9
SUM OF ALL RESPONSES TO PHQ QUESTIONS 1-9: 4
SUM OF ALL RESPONSES TO PHQ9 QUESTIONS 1 & 2: 2
5. POOR APPETITE OR OVEREATING: 0
7. TROUBLE CONCENTRATING ON THINGS, SUCH AS READING THE NEWSPAPER OR WATCHING TELEVISION: 1
6. FEELING BAD ABOUT YOURSELF - OR THAT YOU ARE A FAILURE OR HAVE LET YOURSELF OR YOUR FAMILY DOWN: 0
8. MOVING OR SPEAKING SO SLOWLY THAT OTHER PEOPLE COULD HAVE NOTICED. OR THE OPPOSITE, BEING SO FIGETY OR RESTLESS THAT YOU HAVE BEEN MOVING AROUND A LOT MORE THAN USUAL: 0
3. TROUBLE FALLING OR STAYING ASLEEP: 1
1. LITTLE INTEREST OR PLEASURE IN DOING THINGS: 1
SUM OF ALL RESPONSES TO PHQ QUESTIONS 1-9: 4
SUM OF ALL RESPONSES TO PHQ QUESTIONS 1-9: 4
9. THOUGHTS THAT YOU WOULD BE BETTER OFF DEAD, OR OF HURTING YOURSELF: 0
4. FEELING TIRED OR HAVING LITTLE ENERGY: 0
2. FEELING DOWN, DEPRESSED OR HOPELESS: 1

## 2021-10-07 NOTE — PATIENT INSTRUCTIONS
Patient Education        Learning About Mindfulness for Stress  What are mindfulness and stress? Stress is what you feel when you have to handle more than you are used to. A lot of things can cause stress. You may feel stress when you go on a job interview, take a test, or run a race. This kind of short-term stress is normal and even useful. It can help you if you need to work hard or react quickly. Stress also can last a long time. Long-term stress is caused by stressful situations or events. Examples of long-term stress include long-term health problems, ongoing problems at work, and conflicts in your family. Long-term stress can harm your health. Mindfulness is a focus only on things happening in the present moment. It's a process of purposefully paying attention to and being aware of your surroundings, your emotions, your thoughts, and how your body feels. You are aware of these things, but you aren't judging these experiences as \"good\" or \"bad. \" Mindfulness can help you learn to calm your mind and body to help you cope with illness, pain, and stress. How does mindfulness help to relieve stress? Mindfulness can help quiet your mind and relax your body. Studies show that it can help some people sleep better, feel less anxious, and bring their blood pressure down. And it's been shown to help some people live and cope better with certain health problems like heart disease, depression, chronic pain, and cancer. How do you practice mindfulness? To be mindful is to pay attention, to be present, and to be accepting. · When you're mindful, you do just one thing and you pay close attention to that one thing. For example, you may sit quietly and notice your emotions or how your food tastes and smells. · When you're present, you focus on the things that are happening right now. You let go of your thoughts about the past and the future.  When you dwell on the past or the future, you miss moments that can heal and strengthen you. You may miss moments like hearing a child laugh or seeing a friendly face when you think you're all alone. · When you're accepting, you don't  the present moment. Instead you accept your thoughts and feelings as they come. You can practice anytime, anywhere, and in any way you choose. You can practice in many ways. Here are a few ideas:  · While doing your chores, like washing the dishes, let your mind focus on what's in your hand. What does the dish feel like? Is the water warm or cold? · Go outside and take a few deep breaths. What is the air like? Is it warm or cold? · When you can, take some time at the start of your day to sit alone and think. · Take a slow walk by yourself. Count your steps while you breathe in and out. · Try yoga breathing exercises, stretches, and poses to strengthen and relax your muscles. · At work, if you can, try to stop for a few moments each hour. Note how your body feels. Let yourself regroup and let your mind settle before you return to what you were doing. · If you struggle with anxiety or \"worry thoughts,\" imagine your mind as a blue brett and your worry thoughts as clouds. Now imagine those worry thoughts floating across your mind's brett. Just let them pass by as you watch. Follow-up care is a key part of your treatment and safety. Be sure to make and go to all appointments, and call your doctor if you are having problems. It's also a good idea to know your test results and keep a list of the medicines you take. Where can you learn more? Go to https://[email or web address]. org and sign in to your [employer] account. Enter Q411 in the [employer] box to learn more about \"Learning About Mindfulness for Stress. \"     If you do not have an account, please click on the \"Sign Up Now\" link. Current as of: June 16, 2021               Content Version: 13.0  © 8206-8346 [employer]wise, [employer].    Care instructions adapted under license by [facility]. If you have questions about a medical condition or this instruction, always ask your healthcare professional. [place] [address] any warranty or liability for your use of this information.
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
## 2021-10-11 ENCOUNTER — APPOINTMENT (OUTPATIENT)
Dept: CT IMAGING | Age: 46
End: 2021-10-11
Payer: COMMERCIAL

## 2021-10-11 ENCOUNTER — HOSPITAL ENCOUNTER (EMERGENCY)
Age: 46
Discharge: HOME | End: 2021-10-11
Attending: EMERGENCY MEDICINE
Payer: COMMERCIAL

## 2021-10-11 ENCOUNTER — NURSE TRIAGE (OUTPATIENT)
Dept: OTHER | Facility: CLINIC | Age: 46
End: 2021-10-11

## 2021-10-11 VITALS
TEMPERATURE: 98.1 F | RESPIRATION RATE: 16 BRPM | OXYGEN SATURATION: 98 % | BODY MASS INDEX: 31.41 KG/M2 | HEIGHT: 60 IN | WEIGHT: 160 LBS | DIASTOLIC BLOOD PRESSURE: 83 MMHG | SYSTOLIC BLOOD PRESSURE: 108 MMHG | HEART RATE: 74 BPM

## 2021-10-11 DIAGNOSIS — M54.12 CERVICAL RADICULOPATHY: ICD-10-CM
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
## 2021-10-11 PROCEDURE — 99283 EMERGENCY DEPT VISIT LOW MDM: CPT

## 2021-10-11 PROCEDURE — 6370000000 HC RX 637 (ALT 250 FOR IP): Performed by: PHYSICIAN ASSISTANT

## 2021-10-11 PROCEDURE — 72125 CT NECK SPINE W/O DYE: CPT

## 2021-10-11 RX ORDER — METAXALONE 800 MG/1
800 TABLET ORAL ONCE
Status: COMPLETED | OUTPATIENT
Start: 2021-10-11 | End: 2021-10-11

## 2021-10-11 RX ORDER — METHOCARBAMOL 750 MG/1
750 TABLET, FILM COATED ORAL 4 TIMES DAILY
Qty: 40 TABLET | Refills: 0 | Status: SHIPPED | OUTPATIENT
Start: 2021-10-11 | End: 2021-10-21

## 2021-10-11 RX ORDER — ACETAMINOPHEN AND CODEINE PHOSPHATE 300; 30 MG/1; MG/1
1-2 TABLET ORAL 3 TIMES DAILY PRN
Qty: 12 TABLET | Refills: 0 | Status: SHIPPED | OUTPATIENT
Start: 2021-10-11 | End: 2021-10-14

## 2021-10-11 RX ORDER — ACETAMINOPHEN 500 MG
1000 TABLET ORAL ONCE
Status: COMPLETED | OUTPATIENT
Start: 2021-10-11 | End: 2021-10-11

## 2021-10-11 RX ADMIN — METAXALONE 800 MG: 800 TABLET ORAL at 14:48

## 2021-10-11 RX ADMIN — ACETAMINOPHEN 1000 MG: 500 TABLET ORAL at 14:48

## 2021-10-11 ASSESSMENT — PAIN SCALES - GENERAL
PAINLEVEL_OUTOF10: 9
PAINLEVEL_OUTOF10: 10

## 2021-10-11 ASSESSMENT — PAIN DESCRIPTION - LOCATION: LOCATION: NECK;SHOULDER

## 2021-10-11 NOTE — Clinical Note
Sam Waite was seen and treated in our emergency department on 10/11/2021. She may return to work on 10/14/2021. If you have any questions or concerns, please don't hesitate to call.       Jeffery Strange PA-C
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
## 2021-10-12 ENCOUNTER — TELEPHONE (OUTPATIENT)
Dept: FAMILY MEDICINE CLINIC | Age: 46
End: 2021-10-12

## 2021-10-12 NOTE — TELEPHONE ENCOUNTER
Pt states that she does not want a Hospital f/u appt. She claimed that she does not want to add anymore appts to her insurance as she is having surgery later this month and doesn't want them to not cover the surgery.  She stated that she would call us if she needed anything in the meantime

## 2021-10-15 ENCOUNTER — HOSPITAL ENCOUNTER (OUTPATIENT)
Dept: LAB | Age: 46
Setting detail: SPECIMEN
Discharge: HOME | End: 2021-10-15
Payer: COMMERCIAL

## 2021-10-15 DIAGNOSIS — Z01.818 PREOP TESTING: Primary | ICD-10-CM

## 2021-10-19 ENCOUNTER — HOSPITAL ENCOUNTER (OUTPATIENT)
Age: 46
Setting detail: OUTPATIENT SURGERY
Discharge: HOME | End: 2021-10-19
Attending: PLASTIC SURGERY | Admitting: PLASTIC SURGERY
Payer: COMMERCIAL

## 2021-10-19 ENCOUNTER — ANESTHESIA EVENT (OUTPATIENT)
Dept: OPERATING ROOM | Age: 46
End: 2021-10-19
Payer: COMMERCIAL

## 2021-10-19 ENCOUNTER — ANESTHESIA (OUTPATIENT)
Dept: OPERATING ROOM | Age: 46
End: 2021-10-19
Payer: COMMERCIAL

## 2021-10-19 VITALS — DIASTOLIC BLOOD PRESSURE: 90 MMHG | TEMPERATURE: 97.5 F | OXYGEN SATURATION: 98 % | SYSTOLIC BLOOD PRESSURE: 133 MMHG

## 2021-10-19 VITALS
RESPIRATION RATE: 16 BRPM | BODY MASS INDEX: 28.17 KG/M2 | TEMPERATURE: 97.2 F | SYSTOLIC BLOOD PRESSURE: 123 MMHG | OXYGEN SATURATION: 100 % | DIASTOLIC BLOOD PRESSURE: 84 MMHG | WEIGHT: 165 LBS | HEIGHT: 64 IN | HEART RATE: 69 BPM

## 2021-10-19 DIAGNOSIS — Z98.890 STATUS POST PANNICULECTOMY: ICD-10-CM

## 2021-10-19 DIAGNOSIS — G89.18 POSTOPERATIVE PAIN: Primary | ICD-10-CM

## 2021-10-19 LAB — HCG, PREGNANCY URINE (POC): NEGATIVE

## 2021-10-19 PROCEDURE — 6360000002 HC RX W HCPCS: Performed by: ANESTHESIOLOGY

## 2021-10-19 PROCEDURE — 3700000001 HC ADD 15 MINUTES (ANESTHESIA): Performed by: PLASTIC SURGERY

## 2021-10-19 PROCEDURE — 2500000003 HC RX 250 WO HCPCS: Performed by: ANESTHESIOLOGY

## 2021-10-19 PROCEDURE — 64488 TAP BLOCK BI INJECTION: CPT | Performed by: ANESTHESIOLOGY

## 2021-10-19 PROCEDURE — 15847 EXC SKIN ABD ADD-ON: CPT | Performed by: PLASTIC SURGERY

## 2021-10-19 PROCEDURE — 7100000001 HC PACU RECOVERY - ADDTL 15 MIN: Performed by: PLASTIC SURGERY

## 2021-10-19 PROCEDURE — 6360000002 HC RX W HCPCS: Performed by: NURSE ANESTHETIST, CERTIFIED REGISTERED

## 2021-10-19 PROCEDURE — 15830 EXC EXCESSIVE SKIN ABDOMEN: CPT | Performed by: PLASTIC SURGERY

## 2021-10-19 PROCEDURE — C9290 INJ, BUPIVACAINE LIPOSOME: HCPCS | Performed by: ANESTHESIOLOGY

## 2021-10-19 PROCEDURE — 81025 URINE PREGNANCY TEST: CPT

## 2021-10-19 PROCEDURE — 7100000011 HC PHASE II RECOVERY - ADDTL 15 MIN: Performed by: PLASTIC SURGERY

## 2021-10-19 PROCEDURE — 7100000000 HC PACU RECOVERY - FIRST 15 MIN: Performed by: PLASTIC SURGERY

## 2021-10-19 PROCEDURE — 88304 TISSUE EXAM BY PATHOLOGIST: CPT

## 2021-10-19 PROCEDURE — 2500000003 HC RX 250 WO HCPCS: Performed by: NURSE ANESTHETIST, CERTIFIED REGISTERED

## 2021-10-19 PROCEDURE — 3700000000 HC ANESTHESIA ATTENDED CARE: Performed by: PLASTIC SURGERY

## 2021-10-19 PROCEDURE — 2580000003 HC RX 258: Performed by: ANESTHESIOLOGY

## 2021-10-19 PROCEDURE — 6370000000 HC RX 637 (ALT 250 FOR IP): Performed by: ANESTHESIOLOGY

## 2021-10-19 PROCEDURE — 3600000012 HC SURGERY LEVEL 2 ADDTL 15MIN: Performed by: PLASTIC SURGERY

## 2021-10-19 PROCEDURE — 3600000002 HC SURGERY LEVEL 2 BASE: Performed by: PLASTIC SURGERY

## 2021-10-19 PROCEDURE — 2709999900 HC NON-CHARGEABLE SUPPLY: Performed by: PLASTIC SURGERY

## 2021-10-19 PROCEDURE — 7100000010 HC PHASE II RECOVERY - FIRST 15 MIN: Performed by: PLASTIC SURGERY

## 2021-10-19 RX ORDER — SODIUM CHLORIDE 0.9 % (FLUSH) 0.9 %
10 SYRINGE (ML) INJECTION EVERY 12 HOURS SCHEDULED
Status: DISCONTINUED | OUTPATIENT
Start: 2021-10-19 | End: 2021-10-19 | Stop reason: HOSPADM

## 2021-10-19 RX ORDER — LIDOCAINE HYDROCHLORIDE 20 MG/ML
INJECTION, SOLUTION EPIDURAL; INFILTRATION; INTRACAUDAL; PERINEURAL PRN
Status: DISCONTINUED | OUTPATIENT
Start: 2021-10-19 | End: 2021-10-19 | Stop reason: SDUPTHER

## 2021-10-19 RX ORDER — HYDROCODONE BITARTRATE AND ACETAMINOPHEN 5; 325 MG/1; MG/1
1 TABLET ORAL PRN
Status: COMPLETED | OUTPATIENT
Start: 2021-10-19 | End: 2021-10-19

## 2021-10-19 RX ORDER — CIPROFLOXACIN 2 MG/ML
INJECTION, SOLUTION INTRAVENOUS PRN
Status: DISCONTINUED | OUTPATIENT
Start: 2021-10-19 | End: 2021-10-19

## 2021-10-19 RX ORDER — FENTANYL CITRATE 50 UG/ML
50 INJECTION, SOLUTION INTRAMUSCULAR; INTRAVENOUS EVERY 5 MIN PRN
Status: COMPLETED | OUTPATIENT
Start: 2021-10-19 | End: 2021-10-19

## 2021-10-19 RX ORDER — GABAPENTIN 300 MG/1
300 CAPSULE ORAL 3 TIMES DAILY
Qty: 60 CAPSULE | Refills: 0 | Status: SHIPPED | OUTPATIENT
Start: 2021-10-19 | End: 2021-12-16 | Stop reason: ALTCHOICE

## 2021-10-19 RX ORDER — BACLOFEN 10 MG/1
10 TABLET ORAL 3 TIMES DAILY
Qty: 30 TABLET | Refills: 0 | Status: SHIPPED | OUTPATIENT
Start: 2021-10-19 | End: 2021-12-16 | Stop reason: ALTCHOICE

## 2021-10-19 RX ORDER — SCOLOPAMINE TRANSDERMAL SYSTEM 1 MG/1
1 PATCH, EXTENDED RELEASE TRANSDERMAL
Status: DISCONTINUED | OUTPATIENT
Start: 2021-10-19 | End: 2021-10-19 | Stop reason: HOSPADM

## 2021-10-19 RX ORDER — ONDANSETRON 2 MG/ML
4 INJECTION INTRAMUSCULAR; INTRAVENOUS
Status: DISCONTINUED | OUTPATIENT
Start: 2021-10-19 | End: 2021-10-19 | Stop reason: HOSPADM

## 2021-10-19 RX ORDER — CIPROFLOXACIN 2 MG/ML
INJECTION, SOLUTION INTRAVENOUS PRN
Status: DISCONTINUED | OUTPATIENT
Start: 2021-10-19 | End: 2021-10-19 | Stop reason: SDUPTHER

## 2021-10-19 RX ORDER — LIDOCAINE HYDROCHLORIDE 10 MG/ML
1 INJECTION, SOLUTION EPIDURAL; INFILTRATION; INTRACAUDAL; PERINEURAL
Status: DISCONTINUED | OUTPATIENT
Start: 2021-10-19 | End: 2021-10-19 | Stop reason: HOSPADM

## 2021-10-19 RX ORDER — SODIUM CHLORIDE 0.9 % (FLUSH) 0.9 %
10 SYRINGE (ML) INJECTION PRN
Status: DISCONTINUED | OUTPATIENT
Start: 2021-10-19 | End: 2021-10-19 | Stop reason: HOSPADM

## 2021-10-19 RX ORDER — ONDANSETRON 2 MG/ML
INJECTION INTRAMUSCULAR; INTRAVENOUS PRN
Status: DISCONTINUED | OUTPATIENT
Start: 2021-10-19 | End: 2021-10-19 | Stop reason: SDUPTHER

## 2021-10-19 RX ORDER — HYDROCODONE BITARTRATE AND ACETAMINOPHEN 5; 325 MG/1; MG/1
2 TABLET ORAL PRN
Status: COMPLETED | OUTPATIENT
Start: 2021-10-19 | End: 2021-10-19

## 2021-10-19 RX ORDER — ROCURONIUM BROMIDE 10 MG/ML
INJECTION, SOLUTION INTRAVENOUS PRN
Status: DISCONTINUED | OUTPATIENT
Start: 2021-10-19 | End: 2021-10-19 | Stop reason: SDUPTHER

## 2021-10-19 RX ORDER — SODIUM CHLORIDE 9 MG/ML
25 INJECTION, SOLUTION INTRAVENOUS PRN
Status: DISCONTINUED | OUTPATIENT
Start: 2021-10-19 | End: 2021-10-19 | Stop reason: HOSPADM

## 2021-10-19 RX ORDER — PROPOFOL 10 MG/ML
INJECTION, EMULSION INTRAVENOUS PRN
Status: DISCONTINUED | OUTPATIENT
Start: 2021-10-19 | End: 2021-10-19 | Stop reason: SDUPTHER

## 2021-10-19 RX ORDER — FENTANYL CITRATE 50 UG/ML
25 INJECTION, SOLUTION INTRAMUSCULAR; INTRAVENOUS EVERY 5 MIN PRN
Status: DISCONTINUED | OUTPATIENT
Start: 2021-10-19 | End: 2021-10-19 | Stop reason: HOSPADM

## 2021-10-19 RX ORDER — BUPIVACAINE HYDROCHLORIDE 5 MG/ML
INJECTION, SOLUTION EPIDURAL; INTRACAUDAL
Status: COMPLETED | OUTPATIENT
Start: 2021-10-19 | End: 2021-10-19

## 2021-10-19 RX ORDER — FENTANYL CITRATE 50 UG/ML
INJECTION, SOLUTION INTRAMUSCULAR; INTRAVENOUS PRN
Status: DISCONTINUED | OUTPATIENT
Start: 2021-10-19 | End: 2021-10-19 | Stop reason: SDUPTHER

## 2021-10-19 RX ORDER — DEXAMETHASONE SODIUM PHOSPHATE 10 MG/ML
INJECTION INTRAMUSCULAR; INTRAVENOUS PRN
Status: DISCONTINUED | OUTPATIENT
Start: 2021-10-19 | End: 2021-10-19 | Stop reason: SDUPTHER

## 2021-10-19 RX ORDER — PROMETHAZINE HYDROCHLORIDE 25 MG/ML
6.25 INJECTION, SOLUTION INTRAMUSCULAR; INTRAVENOUS
Status: DISCONTINUED | OUTPATIENT
Start: 2021-10-19 | End: 2021-10-19 | Stop reason: HOSPADM

## 2021-10-19 RX ORDER — SODIUM CHLORIDE, SODIUM LACTATE, POTASSIUM CHLORIDE, CALCIUM CHLORIDE 600; 310; 30; 20 MG/100ML; MG/100ML; MG/100ML; MG/100ML
INJECTION, SOLUTION INTRAVENOUS CONTINUOUS
Status: DISCONTINUED | OUTPATIENT
Start: 2021-10-19 | End: 2021-10-19 | Stop reason: HOSPADM

## 2021-10-19 RX ORDER — SODIUM CHLORIDE 9 MG/ML
INJECTION, SOLUTION INTRAVENOUS CONTINUOUS
Status: DISCONTINUED | OUTPATIENT
Start: 2021-10-19 | End: 2021-10-19 | Stop reason: HOSPADM

## 2021-10-19 RX ORDER — MIDAZOLAM HYDROCHLORIDE 1 MG/ML
INJECTION INTRAMUSCULAR; INTRAVENOUS PRN
Status: DISCONTINUED | OUTPATIENT
Start: 2021-10-19 | End: 2021-10-19 | Stop reason: SDUPTHER

## 2021-10-19 RX ORDER — HYDROCODONE BITARTRATE AND ACETAMINOPHEN 5; 325 MG/1; MG/1
1 TABLET ORAL EVERY 6 HOURS PRN
Qty: 28 TABLET | Refills: 0 | Status: SHIPPED | OUTPATIENT
Start: 2021-10-19 | End: 2021-10-26

## 2021-10-19 RX ORDER — CIPROFLOXACIN 500 MG/1
500 TABLET, FILM COATED ORAL 2 TIMES DAILY
Qty: 20 TABLET | Refills: 0 | Status: SHIPPED | OUTPATIENT
Start: 2021-10-19 | End: 2021-10-29

## 2021-10-19 RX ADMIN — ONDANSETRON 4 MG: 2 INJECTION, SOLUTION INTRAMUSCULAR; INTRAVENOUS at 11:47

## 2021-10-19 RX ADMIN — SODIUM CHLORIDE, POTASSIUM CHLORIDE, SODIUM LACTATE AND CALCIUM CHLORIDE: 600; 310; 30; 20 INJECTION, SOLUTION INTRAVENOUS at 07:31

## 2021-10-19 RX ADMIN — FENTANYL CITRATE 50 MCG: 50 INJECTION INTRAMUSCULAR; INTRAVENOUS at 12:51

## 2021-10-19 RX ADMIN — Medication 25 MCG: at 11:24

## 2021-10-19 RX ADMIN — MIDAZOLAM 2 MG: 1 INJECTION INTRAMUSCULAR; INTRAVENOUS at 09:13

## 2021-10-19 RX ADMIN — FENTANYL CITRATE 50 MCG: 50 INJECTION INTRAMUSCULAR; INTRAVENOUS at 13:13

## 2021-10-19 RX ADMIN — CIPROFLOXACIN 400 MG: 2 INJECTION, SOLUTION INTRAVENOUS at 09:25

## 2021-10-19 RX ADMIN — BUPIVACAINE 20 ML: 13.3 INJECTION, SUSPENSION, LIPOSOMAL INFILTRATION at 09:33

## 2021-10-19 RX ADMIN — Medication 100 MCG: at 09:20

## 2021-10-19 RX ADMIN — Medication 25 MCG: at 10:00

## 2021-10-19 RX ADMIN — ROCURONIUM BROMIDE 50 MG: 10 INJECTION, SOLUTION INTRAVENOUS at 09:20

## 2021-10-19 RX ADMIN — PROPOFOL 160 MG: 10 INJECTION, EMULSION INTRAVENOUS at 09:20

## 2021-10-19 RX ADMIN — BUPIVACAINE HYDROCHLORIDE 20 ML: 5 INJECTION, SOLUTION EPIDURAL; INTRACAUDAL; PERINEURAL at 09:33

## 2021-10-19 RX ADMIN — FENTANYL CITRATE 50 MCG: 50 INJECTION INTRAMUSCULAR; INTRAVENOUS at 13:20

## 2021-10-19 RX ADMIN — FENTANYL CITRATE 25 MCG: 50 INJECTION INTRAMUSCULAR; INTRAVENOUS at 14:01

## 2021-10-19 RX ADMIN — PROPOFOL 25 MCG/KG/MIN: 10 INJECTION, EMULSION INTRAVENOUS at 09:25

## 2021-10-19 RX ADMIN — Medication 25 MCG: at 12:08

## 2021-10-19 RX ADMIN — LIDOCAINE HYDROCHLORIDE 100 MG: 20 INJECTION, SOLUTION EPIDURAL; INFILTRATION; INTRACAUDAL; PERINEURAL at 09:20

## 2021-10-19 RX ADMIN — FENTANYL CITRATE 50 MCG: 50 INJECTION INTRAMUSCULAR; INTRAVENOUS at 12:46

## 2021-10-19 RX ADMIN — Medication 25 MCG: at 10:03

## 2021-10-19 RX ADMIN — DEXAMETHASONE SODIUM PHOSPHATE 10 MG: 10 INJECTION INTRAMUSCULAR; INTRAVENOUS at 09:25

## 2021-10-19 RX ADMIN — HYDROCODONE BITARTRATE AND ACETAMINOPHEN 2 TABLET: 5; 325 TABLET ORAL at 14:11

## 2021-10-19 ASSESSMENT — PULMONARY FUNCTION TESTS
PIF_VALUE: 20
PIF_VALUE: 20
PIF_VALUE: 21
PIF_VALUE: 18
PIF_VALUE: 20
PIF_VALUE: 20
PIF_VALUE: 21
PIF_VALUE: 19
PIF_VALUE: 21
PIF_VALUE: 20
PIF_VALUE: 20
PIF_VALUE: 19
PIF_VALUE: 18
PIF_VALUE: 20
PIF_VALUE: 8
PIF_VALUE: 18
PIF_VALUE: 17
PIF_VALUE: 21
PIF_VALUE: 19
PIF_VALUE: 20
PIF_VALUE: 21
PIF_VALUE: 1
PIF_VALUE: 20
PIF_VALUE: 19
PIF_VALUE: 20
PIF_VALUE: 18
PIF_VALUE: 20
PIF_VALUE: 17
PIF_VALUE: 20
PIF_VALUE: 20
PIF_VALUE: 23
PIF_VALUE: 20
PIF_VALUE: 18
PIF_VALUE: 20
PIF_VALUE: 19
PIF_VALUE: 21
PIF_VALUE: 19
PIF_VALUE: 19
PIF_VALUE: 18
PIF_VALUE: 20
PIF_VALUE: 17
PIF_VALUE: 20
PIF_VALUE: 19
PIF_VALUE: 18
PIF_VALUE: 21
PIF_VALUE: 20
PIF_VALUE: 19
PIF_VALUE: 20
PIF_VALUE: 20
PIF_VALUE: 21
PIF_VALUE: 20
PIF_VALUE: 18
PIF_VALUE: 20
PIF_VALUE: 18
PIF_VALUE: 21
PIF_VALUE: 20
PIF_VALUE: 21
PIF_VALUE: 20
PIF_VALUE: 20
PIF_VALUE: 19
PIF_VALUE: 18
PIF_VALUE: 23
PIF_VALUE: 21
PIF_VALUE: 19
PIF_VALUE: 20
PIF_VALUE: 18
PIF_VALUE: 19
PIF_VALUE: 20
PIF_VALUE: 21
PIF_VALUE: 19
PIF_VALUE: 20
PIF_VALUE: 19
PIF_VALUE: 18
PIF_VALUE: 21
PIF_VALUE: 20
PIF_VALUE: 18
PIF_VALUE: 3
PIF_VALUE: 18
PIF_VALUE: 3
PIF_VALUE: 18
PIF_VALUE: 18
PIF_VALUE: 17
PIF_VALUE: 19
PIF_VALUE: 21
PIF_VALUE: 1
PIF_VALUE: 17
PIF_VALUE: 19
PIF_VALUE: 19
PIF_VALUE: 17
PIF_VALUE: 19
PIF_VALUE: 20
PIF_VALUE: 22
PIF_VALUE: 21
PIF_VALUE: 20
PIF_VALUE: 18
PIF_VALUE: 19
PIF_VALUE: 20
PIF_VALUE: 18
PIF_VALUE: 21
PIF_VALUE: 20
PIF_VALUE: 21
PIF_VALUE: 19
PIF_VALUE: 20
PIF_VALUE: 19
PIF_VALUE: 19
PIF_VALUE: 18
PIF_VALUE: 19
PIF_VALUE: 19
PIF_VALUE: 18
PIF_VALUE: 21
PIF_VALUE: 18
PIF_VALUE: 21
PIF_VALUE: 18
PIF_VALUE: 20
PIF_VALUE: 4
PIF_VALUE: 20
PIF_VALUE: 20
PIF_VALUE: 21
PIF_VALUE: 20
PIF_VALUE: 20
PIF_VALUE: 19
PIF_VALUE: 21
PIF_VALUE: 20
PIF_VALUE: 20
PIF_VALUE: 19
PIF_VALUE: 18
PIF_VALUE: 2
PIF_VALUE: 18
PIF_VALUE: 21
PIF_VALUE: 20
PIF_VALUE: 21
PIF_VALUE: 18
PIF_VALUE: 1
PIF_VALUE: 20
PIF_VALUE: 20
PIF_VALUE: 19
PIF_VALUE: 21
PIF_VALUE: 21
PIF_VALUE: 20
PIF_VALUE: 21
PIF_VALUE: 17
PIF_VALUE: 18
PIF_VALUE: 19
PIF_VALUE: 18
PIF_VALUE: 21
PIF_VALUE: 18
PIF_VALUE: 20
PIF_VALUE: 18
PIF_VALUE: 18
PIF_VALUE: 19
PIF_VALUE: 21
PIF_VALUE: 20
PIF_VALUE: 20
PIF_VALUE: 18
PIF_VALUE: 20
PIF_VALUE: 19
PIF_VALUE: 20
PIF_VALUE: 20
PIF_VALUE: 19
PIF_VALUE: 20
PIF_VALUE: 19
PIF_VALUE: 19
PIF_VALUE: 18
PIF_VALUE: 21
PIF_VALUE: 20
PIF_VALUE: 3
PIF_VALUE: 18
PIF_VALUE: 17
PIF_VALUE: 20
PIF_VALUE: 18
PIF_VALUE: 20
PIF_VALUE: 17
PIF_VALUE: 20
PIF_VALUE: 20
PIF_VALUE: 1
PIF_VALUE: 20
PIF_VALUE: 20
PIF_VALUE: 21
PIF_VALUE: 17
PIF_VALUE: 20
PIF_VALUE: 18
PIF_VALUE: 21
PIF_VALUE: 3
PIF_VALUE: 20
PIF_VALUE: 21
PIF_VALUE: 18
PIF_VALUE: 21
PIF_VALUE: 20
PIF_VALUE: 20
PIF_VALUE: 19
PIF_VALUE: 20
PIF_VALUE: 1
PIF_VALUE: 1
PIF_VALUE: 20

## 2021-10-19 ASSESSMENT — PAIN DESCRIPTION - LOCATION: LOCATION: ABDOMEN

## 2021-10-19 ASSESSMENT — PAIN SCALES - GENERAL
PAINLEVEL_OUTOF10: 7
PAINLEVEL_OUTOF10: 8
PAINLEVEL_OUTOF10: 10
PAINLEVEL_OUTOF10: 7
PAINLEVEL_OUTOF10: 10
PAINLEVEL_OUTOF10: 8

## 2021-10-19 ASSESSMENT — PAIN DESCRIPTION - PROGRESSION: CLINICAL_PROGRESSION: GRADUALLY WORSENING

## 2021-10-19 ASSESSMENT — PAIN DESCRIPTION - FREQUENCY: FREQUENCY: CONTINUOUS

## 2021-10-19 ASSESSMENT — PAIN DESCRIPTION - ORIENTATION: ORIENTATION: ANTERIOR

## 2021-10-19 ASSESSMENT — PAIN DESCRIPTION - DESCRIPTORS: DESCRIPTORS: PATIENT UNABLE TO DESCRIBE

## 2021-10-19 ASSESSMENT — PAIN - FUNCTIONAL ASSESSMENT: PAIN_FUNCTIONAL_ASSESSMENT: 0-10

## 2021-10-19 ASSESSMENT — ENCOUNTER SYMPTOMS: SHORTNESS OF BREATH: 0

## 2021-10-19 ASSESSMENT — PAIN DESCRIPTION - ONSET: ONSET: AWAKENED FROM SLEEP

## 2021-10-19 ASSESSMENT — PAIN DESCRIPTION - PAIN TYPE: TYPE: ACUTE PAIN;SURGICAL PAIN

## 2021-10-19 NOTE — ANESTHESIA PROCEDURE NOTES
Peripheral Block    Patient location during procedure: OR  Start time: 10/19/2021 9:13 AM  End time: 10/19/2021 9:23 AM  Staffing  Performed: anesthesiologist   Anesthesiologist: Buster Awan MD  Preanesthetic Checklist  Completed: patient identified, IV checked, site marked, risks and benefits discussed, surgical consent, monitors and equipment checked, pre-op evaluation, timeout performed, anesthesia consent given, oxygen available and patient being monitored  Peripheral Block  Prep: ChloraPrep  Patient monitoring: cardiac monitor, continuous pulse ox, frequent blood pressure checks and IV access  Block type: TAP  Laterality: bilateral  Injection technique: single-shot  Guidance: ultrasound guided  Local infiltration: lidocaine  Infiltration strength: 1 %  Dose: 3 mL  Provider prep: mask and sterile gloves  Local infiltration: lidocaine  Needle  Needle gauge: 21 G  Needle length: 10 cm  Needle localization: ultrasound guidance  Assessment  Injection assessment: negative aspiration for heme, no paresthesia on injection and local visualized surrounding nerve on ultrasound  Paresthesia pain: none  Slow fractionated injection: yes  Hemodynamics: stable  Additional Notes  U/S [id number].  (1) Under ultrasound guidance, a  gauge needle was inserted and placed in close proximity to the  nerve.  (2) Ultrasound was also used to visualize the spread of the anesthetic in close proximity to the nerve being blocked. (3) The nerve appeared anatomically normal, and (4 there were no apparent abnormal pathological findings on the image that were readily visible and related to the nerve being blocked. (5) A permanent ultrasound image was saved in the patient's record.     10/10 per side      Medications Administered  Bupivacaine (MARCAINE) PF injection 0.5%, 20 mL  bupivacaine liposome (EXPAREL) injection 1.3%, 20 mL  Reason for block: post-op pain management and at surgeon's request

## 2021-10-19 NOTE — ANESTHESIA PRE PROCEDURE
Department of Anesthesiology  Preprocedure Note       Name:  Lani Saravia   Age:  48 y.o.  :  1975                                          MRN:  [id number]         Date:  10/19/2021      Surgeon: Lonna Frankel):  Adore Velasco MD    Procedure: Procedure(s):  PANNICULECTOMY/ABDOMINOPLASTY, TAP BLOCK, INCISIONAL WOUND VAC, BIOPATCH    Medications prior to admission:   Prior to Admission medications    Medication Sig Start Date End Date Taking? Authorizing Provider   methocarbamol (ROBAXIN-750) 750 MG tablet Take 1 tablet by mouth 4 times daily for 10 days 10/11/21 10/21/21 Yes Stevenson Lee PA-C   budesonide (RINOCORT AQUA) 32 MCG/ACT nasal spray 1 spray by Each Nostril route daily for 30 days, THEN 1 spray daily.  10/7/21 12/6/21 Yes ALEISHA Zamarripa CNP   furosemide (LASIX) 20 MG tablet Take 20 mg by mouth 2 times daily   Yes Historical Provider, MD   Pseudoephedrine HCl (SUDAFED 24 HOUR PO) Take 1 tablet by mouth daily   Yes Historical Provider, MD   lisinopril (PRINIVIL;ZESTRIL) 40 MG tablet take 1 tablet by mouth once daily 21  Yes ALEISHA Valdez CNP   polyethyl glycol-propyl glycol 0.4-0.3 % (SYSTANE) 0.4-0.3 % ophthalmic solution Place 1 drop into both eyes as needed for Dry Eyes 21  Yes Isreal Navarro,    triamterene-hydroCHLOROthiazide (MAXZIDE-25) 37.5-25 MG per tablet take 1 tablet by mouth once daily 21  Yes Isreal Navarro DO   ARIPiprazole (ABILIFY) 20 MG tablet take 1 tablet by mouth every morning 19  Yes Historical Provider, MD   busPIRone (BUSPAR) 15 MG tablet take 1 tablet by mouth twice a day 19  Yes Historical Provider, MD   benztropine (COGENTIN) 2 MG tablet take 1 tablet by mouth twice a day if needed 19  Yes Historical Provider, MD   lidocaine (LMX) 4 % cream Apply topically BID as needed for pain 10/7/21   Renella A Gauamis, APRN - CNP   aspirin 81 MG EC tablet Take 81 mg by mouth daily    Historical Provider, MD Adapalene-Benzoyl Peroxide 0.1-2.5 % GEL daily 10/13/20   Lorna Izquierdo DO       Current medications:    Current Facility-Administered Medications   Medication Dose Route Frequency Provider Last Rate Last Admin    0.9 % sodium chloride infusion   IntraVENous Continuous Lucie Cross MD        lactated ringers infusion   IntraVENous Continuous Mar Antony  mL/hr at 10/19/21 0731 New Bag at 10/19/21 0731    sodium chloride flush 0.9 % injection 10 mL  10 mL IntraVENous 2 times per day Mar Antony MD        sodium chloride flush 0.9 % injection 10 mL  10 mL IntraVENous PRN Lucie Cross MD        0.9 % sodium chloride infusion  25 mL IntraVENous PRN Mar Antony MD        lidocaine PF 1 % injection 1 mL  1 mL IntraDERmal Once PRN Mar Antony MD        bupivacaine liposome (EXPAREL) 1.3 % injection 266 mg  20 mL SubCUTAneous Once Susan Park MD           Allergies:     Allergies   Allergen Reactions    Celebrex [Celecoxib] Shortness Of Breath and Swelling    Pcn [Penicillins]      c/o yeast infection     Trazodone And Nefazodone      Suicidal thoughts       Problem List:    Patient Active Problem List   Diagnosis Code    Seasonal allergies J30.2    HTN (hypertension) I10    Iron (Fe) deficiency anemia D50.9    Obesity E66.9    GERD (gastroesophageal reflux disease) K21.9    Bell's palsy G51.0    Post-nasal drip R09.82    Nasal obstruction J34.89    Nasal congestion R09.81    Raynaud's phenomenon without gangrene I73.00    History of low transverse  section Z98.891    Bipolar 1 disorder, depressed (HCC) F31.9    Post traumatic stress disorder F43.10    Primary insomnia F51.01    Excess skin of abdomen L98.7    Major depressive disorder, recurrent episode, mild (HCC) F33.0    Chronic pain of right knee M25.561, G89.29    Right hip pain M25.551    Neck pain on right side M54.2    History of sinus surgery Z98.890    Episodic overuse of medication Z91.14    S/P 10/05/2021    CREATININE 0.64 10/05/2021    GFRAA >60 10/05/2021    LABGLOM >60 10/05/2021    GLUCOSE 75 04/12/2021    PROT 6.9 06/16/2017    CALCIUM 9.8 04/12/2021    BILITOT 0.27 06/16/2017    ALKPHOS 41 06/16/2017    AST 22 04/12/2021    ALT 19 04/12/2021       POC Tests: No results for input(s): POCGLU, POCNA, POCK, POCCL, POCBUN, POCHEMO, POCHCT in the last 72 hours. Coags: No results found for: PROTIME, INR, APTT    HCG (If Applicable):   Lab Results   Component Value Date    HCG NEGATIVE 10/19/2021        ABGs: No results found for: PHART, PO2ART, EBJ6INV, LKP5AGS, BEART, V9PXLHWN     Type & Screen (If Applicable):  No results found for: LABABO, LABRH    Drug/Infectious Status (If Applicable):  No results found for: HIV, HEPCAB    COVID-19 Screening (If Applicable): No results found for: COVID19        Anesthesia Evaluation    Airway: Mallampati: I  TM distance: >3 FB   Neck ROM: full  Mouth opening: > = 3 FB Dental:          Pulmonary:       (-) shortness of breath                           Cardiovascular:    (+) hypertension:,     (-)  angina                Neuro/Psych:               GI/Hepatic/Renal:             Endo/Other:                     Abdominal:             Vascular:           Other Findings:             Anesthesia Plan      general     ASA 2                                 Callie Velasquez MD   10/19/2021

## 2021-10-19 NOTE — ANESTHESIA POSTPROCEDURE EVALUATION
Department of Anesthesiology  Postprocedure Note    Patient: Lyndsey Ellis  MRN: [id number]  YOB: 1975  Date of evaluation: 10/19/2021  Time:  1:49 PM     Procedure Summary     Date: 10/19/21 Room / Location: [address] [place] [address]    Anesthesia Start: 8857 Anesthesia Stop: 0786    Procedure: PANNICULECTOMY/ABDOMINOPLASTY, TAP BLOCK, INCISIONAL WOUND VAC, BIOPATCH (N/A Abdomen) Diagnosis: (DX DIASTASIS RECTUS, SYMPTOMATIC ABDOMINAL PANNUS)    Surgeons: Eli Bone MD Responsible Provider: Joanna Byers MD    Anesthesia Type: general ASA Status: 2          Anesthesia Type: general    Chery Phase I: Chery Score: 7    Chery Phase II:      Last vitals: Reviewed and per EMR flowsheets.        Anesthesia Post Evaluation    Complications: no

## 2021-10-19 NOTE — INTERVAL H&P NOTE
Interval H&P Note    Pt Name: Cesar Mcclellan  MRN: [id number]  YOB: 1975  Date of evaluation: 10/19/2021      [x] I have reviewed the H&P by AUGUSTO Jones CNP for an Interval History and Physical note. [x] I have examined  Cesar Mcclellan  There are no changes to the patient who is scheduled for a panniculectomy abdominoplasty by Dr Jose Alcantara for diastasis rectus, symptomatic abdominal pannus. The patient was involved in an [address] 10/10/21 and seen in the ED 10/11/21  for c/o right neck pain and paresthesia right arm. She was evaluated, diagnosed with cervical radiculopathy and released. (refer to epic notes) Today has minimal discomfort and denies numbness or tingling in the right extremity The patient denies fever, chills, wheezing, cough, increased SOB, chest pain, open sores or wounds. Last ASA 81mg >week ago    Vital signs: BP (!) 149/92   Pulse 84   Temp 97.8 °F (36.6 °C) (Temporal)   Resp 16   Ht 5' 4\" (1.626 m)   Wt 165 lb (74.8 kg)   LMP 10/10/2021   SpO2 98%   BMI 28.32 kg/m²     Allergies:  Celebrex [celecoxib], Pcn [penicillins], and Trazodone and nefazodone    Medications:    Prior to Admission medications    Medication Sig Start Date End Date Taking? Authorizing Provider   methocarbamol (ROBAXIN-750) 750 MG tablet Take 1 tablet by mouth 4 times daily for 10 days 10/11/21 10/21/21 Yes Ena Quinones PA-C   budesonide (RINOCORT AQUA) 32 MCG/ACT nasal spray 1 spray by Each Nostril route daily for 30 days, THEN 1 spray daily.  10/7/21 12/6/21 Yes ALEISHA Zamarripa CNP   furosemide (LASIX) 20 MG tablet Take 20 mg by mouth 2 times daily   Yes Historical Provider, MD   Pseudoephedrine HCl (SUDAFED 24 HOUR PO) Take 1 tablet by mouth daily   Yes Historical Provider, MD   lisinopril (PRINIVIL;ZESTRIL) 40 MG tablet take 1 tablet by mouth once daily 7/29/21  Yes Yenifer Hill, APRN - CNP   polyethyl glycol-propyl glycol 0.4-0.3 % (SYSTANE) 0.4-0.3 % ophthalmic solution Place 1 drop into both eyes as needed for Dry Eyes 4/20/21  Yes Wilfred Ramirez DO   triamterene-hydroCHLOROthiazide [facility]) 37.5-25 MG per tablet take 1 tablet by mouth once daily 4/20/21  Yes Wilfred Ramirez DO   ARIPiprazole (ABILIFY) 20 MG tablet take 1 tablet by mouth every morning 9/21/19  Yes Historical Provider, MD   busPIRone (BUSPAR) 15 MG tablet take 1 tablet by mouth twice a day 9/21/19  Yes Historical Provider, MD   benztropine (COGENTIN) 2 MG tablet take 1 tablet by mouth twice a day if needed 9/21/19  Yes Historical Provider, MD   lidocaine (LMX) 4 % cream Apply topically BID as needed for pain 10/7/21   ALEISHA Zamarripa CNP   aspirin 81 MG EC tablet Take 81 mg by mouth daily    Historical Provider, MD   Adapalene-Benzoyl Peroxide 0.1-2.5 % GEL daily 10/13/20   Wilfred Ramirez DO         This is a 48 y.o. female who is pleasant, cooperative, alert and oriented x3, in no acute distress    Physical Exam:  Lungs: Bilateral equal air entry, unlabored, clear to ausculation, no wheezing, rales or rhonchi, normal effort  Cardiovascular: HR 84 normal rate, regular rhythm, no murmur, gallop, rub. Abdomen: Soft, nontender, nondistended, normal bowel sounds. Neuro: cranial nerves II-XII grossly intact upper and lower extremity strength equal bilaterally  Nonguarded movement of neck with normal conversation     Labs:  Recent Labs     10/05/21  1019   HGB 12.9   HCT 39.0   WBC 5.4   MCV 96.3         K 4.0      CO2 25   BUN 10   CREATININE 0.64       No results for input(s): COVID19 in the last 720 hours.     ALEISHA Heredia CNP   Electronically signed 10/19/2021 at 7:36 AM           Ernestina Avery MD   Physician   Specialty:  Plastic Surgery   Progress Notes      Signed   Encounter Date:  7/21/2021               Signed        Expand AllCollapse All        [address] [place] [address] [facility]  [facility] [id number]         History and Physical           Chief Complaint   Patient presents with    New Patient       panniculectomy          HPI:   Marianna Crook is a 48 y.o. female who presents with symptomatic panniculectomy. Patient has lost approximately 100 pounds through diet and exercise. Patient's initial weight was 256 pounds. Patient's weight has been stable for 7 years. Patient has excess skin in her abdominal region that interferes with her daily activities and her quality of life. Patient has trouble cleaning her feet. The pannus has an odor to it. It interferes with additional exercises. It interferes with intercourse. It is painful and pulls on the abdominal skin. It prevents her close from not fitting well. A difference with her urination. Patient is here to discuss her options. Patient also continually gets rashes and has performed multiple treatments but they consist generally recur. .     Medications:      Current Facility-Administered Medications          Current Outpatient Medications   Medication Sig Dispense Refill    lidocaine (XYLOCAINE) 5 % ointment Apply topically as needed. 1 Tube 1    fexofenadine (ALLEGRA) 180 MG tablet Take 180 mg by mouth daily        polyethyl glycol-propyl glycol 0.4-0.3 % (SYSTANE) 0.4-0.3 % ophthalmic solution Place 1 drop into both eyes as needed for Dry Eyes 1 Bottle 0    triamterene-hydroCHLOROthiazide (MAXZIDE-25) 37.5-25 MG per tablet take 1 tablet by mouth once daily 90 tablet 1    lisinopril (PRINIVIL;ZESTRIL) 40 MG tablet take 1 tablet by mouth once daily 90 tablet 0    clotrimazole-betamethasone (LOTRISONE) 1-0.05 % cream Apply topically 2 times daily.  45 g 0    fluticasone (FLONASE) 50 MCG/ACT nasal spray instill 1 spray into each nostril once daily 32 g 2    clotrimazole-betamethasone (LOTRISONE) 1-0.05 % lotion apply topically twice a day 30 mL 1    Adapalene-Benzoyl Peroxide 0.1-2.5 % GEL daily 45 g 2    ARIPiprazole (ABILIFY) 20 MG tablet take 1 tablet by mouth every morning   0    busPIRone (BUSPAR) 15 MG tablet take 1 tablet by mouth twice a day   0    benztropine (COGENTIN) 2 MG tablet take 1 tablet by mouth twice a day if needed   0      No current facility-administered medications for this visit. Allergies: Allergies   Allergen Reactions    Celebrex [Celecoxib] Shortness Of Breath and Swelling    Pcn [Penicillins]         c/o yeast infection     Trazodone And Nefazodone         Suicidal thoughts      Review of Systems:   Constitutional: Negative for fever, chills, fatigue and unexpected weight change. HENT: Patient has postnasal drip. Patient has seasonal allergies. Eyes: Negative for pain and discharge. Respiratory: Negative for cough and shortness of breath. Cardiovascular: Patient has hypertension. Gastrointestinal: Patient has a history of GERD. Skin: Negative for pallor and rash. Neurological: Patient has a history of Bell's palsy. Hematological: Patient has anemia which is consistent with iron deficiency. Psychiatric/Behavioral: Negative for behavioral problems. Patient has posttraumatic stress syndrome. Patient has bipolar disorder. .       Past Medical History        Past Medical History:   Diagnosis Date    Bell's palsy      Bipolar 1 disorder, depressed ([facility] 2018    GERD (gastroesophageal reflux disease)      HTN (hypertension)      Iron (Fe) deficiency anemia      Obesity      Post traumatic stress disorder      Post-nasal drip      Seasonal allergies           Past Surgical History         Past Surgical History:   Procedure Laterality Date    BREAST REDUCTION SURGERY         SECTION         Twice    NASAL SINUS SURGERY             Social History               Socioeconomic History    Marital status:        Spouse name: Not on file    Number of children: Not on file    Years of education: Not on file    Highest education level: Not on file   Occupational History    Not on file   Tobacco Use    Smoking status: Former Smoker       Quit date: 1995       Years since quittin.5    Smokeless tobacco: Never Used   Vaping Use    Vaping Use: Never used   Substance and Sexual Activity    Alcohol use: Yes       Comment: occational wine    Drug use: No    Sexual activity: Yes       Partners: Male       Birth control/protection: Surgical       Comment: TL    Other Topics Concern    Not on file   Social History Narrative    Not on file      Social Determinants of Health          Financial Resource Strain:     Difficulty of Paying Living Expenses:    Food Insecurity:     Worried About Running Out of Food in the Last Year:     Ran Out of Food in the Last Year:    Transportation Needs:     Lack of Transportation (Medical):     Lack of Transportation (Non-Medical):    Physical Activity:     Days of Exercise per Week:     Minutes of Exercise per Session:    Stress:     Feeling of Stress :    Social Connections:     Frequency of Communication with Friends and Family:     Frequency of Social Gatherings with Friends and Family:     Attends Zoroastrian Services: Active Member of Clubs or Organizations:     Attends Club or Organization Meetings:     Marital Status:    Intimate Partner Violence:     Fear of Current or Ex-Partner:     Emotionally Abused:     Physically Abused:     Sexually Abused:          Family History         Family History   Problem Relation Age of Onset    High Blood Pressure Mother      Coronary Art Dis Mother      Thyroid Disease Mother      Depression Mother      High Blood Pressure Father      Thyroid Disease Father      Depression Father           Physical Exam:   Resp 12   Ht 5' 1\" (1.549 m)   Wt 173 lb (78.5 kg)   BMI 32.69 kg/m²    Body mass index is 32.69 kg/m². Physical Exam   Nursing note and vitals reviewed. Constitutional: Oriented to person, place, and time. Appears well-developed and well-nourished. No distress. Head: Normocephalic and atraumatic.    Eyes: Conjunctivae and EOM are normal.   Pulmonary/Chest: Effort normal. No respiratory distress. Neurological: Alert and oriented to person, place, and time. Skin: There is multiple stria present. Patient has a  scar. There is rashes present. Abdomen soft tender nondistended. There is rectus diastases present. Psychiatric: Normal mood and affect. Behavior is normal  PANNICULECTOMY HEALTH CHECKLIST:  Height:     Weight:      BMI: There is no height or weight on file to calculate BMI. Does your pannus affect your daily activities and quality of life? Yes x     No    How long: 10+ years   Which daily living activities are affected in the following ways? Cleaning of feet x   Odor    Interferes with exercise x   Icard x   Painful pulling of abdominal skin    Clothes not fitting            Urination x   Pulling of pubic hair    Seat belt not fitting    Other:      Back pain? Yes      No        Any Treatment? Yes      No        Any Testing? Yes      No   Where/What:      Have you had bariatric surgery? Yes      No x        Initial weight: 256 lb        Weight stable for how long? 7 years   Do you have a hernia? Yes      No x        Testing:  CT      Date/location:   Have you had a hernia repair in the past?  Yes      No x        Was mesh used? Yes      No x        Surgeon for hernia:     Any Rashes/Ulcers under folds of skin? Yes  x    No         Medications used:                                                        OTC Powder,                                                                                                RX \"lotrastome\"          Notes:  Also uses paper towels for rashes  Imaging:                                      Impression/Plan:        Diagnosis Orders   1. Diastasis of rectus abdominis      2.  Symptomatic abdominal panniculus             Patient Active Problem List   Diagnosis    Seasonal allergies    HTN (hypertension)    Iron (Fe) deficiency anemia    Obesity    GERD (gastroesophageal reflux disease)    Bell's palsy    Post-nasal drip    Nasal obstruction    Nasal congestion    Raynaud's phenomenon without gangrene    History of low transverse  section    Bipolar 1 disorder, depressed (HCC)    Post traumatic stress disorder    Primary insomnia    Excess skin of abdomen    Major depressive disorder, recurrent episode, mild (HCC)    Chronic pain of right knee    Right hip pain    Neck pain on right side    History of sinus surgery    Episodic overuse of medication ([address]         Plan:  Patient with symptomatic panniculus. Patient also has a rectus diastases. I discussed the differences between abdominoplasty and a panniculectomy. The risk of both procedures were discussed with patient in detail. All questions were answered. The following information was discussed with the Patient, but this is not an all-inclusive-other issues were also discussed with patient. I also discussed the following with the patient. I discussed loss of umbilicus. I discussed scars. I discussed dogears. I discussed wound healing. I discussed skin necrosis. I also discussed that status post bariatric surgery that the skin has stretched beyond its limited and there is still there to be elasticity and deformity. We discussed fat necrosis. We discussed postoperative seromas. We discussed postoperative bleeding. Also discussed malposition of the umbilicus. GENERAL INFORMATION  Panniculectomy is a surgical procedure to remove excess skin and fatty tissue from the lower abdomen wall. Panniculectomy does not treat muscle laxity of the upper abdomen. Obese individuals who intend to lose weight should postpone all forms of body contouring surgery until they have reached a stable weight. There are a variety of different techniques used by plastic surgeons for panniculectomy.  Panniculectomy can be combined with other forms of body-contouring surgery, including suction-assisted lipectomy, or performed at the same time with other elective surgeries. ALTERNATIVE TREATMENTS  Alternative forms of management consist of not treating the areas of loose skin and fatty deposits. Liposuction may be a surgical alternative to if there is good skin tone and localized abdominal fatty. If you have lost your skin laxity as apparent by multiple stretch marks you will not be a good liposuction candidate. Diet and exercise programs may be of benefit in the overall reduction of excess body fat and contour improvement. Risks and potential complications are associated with alternative surgical forms of treatment. RISKS OF PANNICULECTOMY SURGERY  Every surgical procedure involves a certain amount of risk and it is important that you understand these risks and the possible complications associated with them. In addition, every procedure has limitations. An individual's choice to undergo a surgical procedure is based on the comparison of the risk to potential benefit. Although the majority of patients do not experience these complications, you should discuss each of them with your plastic surgeon to make sure you completely understand all possible consequences of a abdominoplasty/panniculectomy. Bleeding- It is possible, though unusual, to experience a bleeding episode during or after surgery. There is blood in the pannus, and depending the size of your pannus you may be subjected to considerable blood loss. Should post-operative bleeding occur, it may require an emergency treatment to drain the accumulated blood or blood transfusion. Intra-operative blood transfusions may be required. Do not take any aspirin or anti-inflammatory medications for ten days before surgery, as this may increase the risk of bleeding. Non-prescription herbs and dietary supplements can increase the risk of surgical bleeding. Hematoma can occur at any time following injury.  If blood transfusions are needed to treat blood loss, there is a risk of blood related infections such as hepatitis and the HIV (AIDS). Heparin medications that are used to prevent blood clots in veins can produce bleeding and decreased blood platelets. Infection - Infection is can occur after this surgery. Should an infection occur, treatment including antibiotics, hospitalization, or additional surgery may be necessary. There is a greater risk of infection when body contouring procedures are performed in conjunction with abdominal surgical procedures. Change in Skin Sensation- It is common to experience diminished (or loss) of skin sensation in areas that have had surgery. Diminished (or complete loss of skin sensation) may not totally resolve after an abdominoplasty/pannicular      Skin Contour Irregularities- Contour and shape irregularities and depressions may occur after abdominoplasty. Visible and palpable wrinkling of skin can occur. Residual skin irregularities at the ends of the incisions or dog ears are always a possibility as is skin pleating when there is excessive redundant skin. This may improve with time, or it can be surgically corrected. Major Wound Separation- Wounds may separate after surgery. Should this occur, additional treatment including surgery may be necessary. Skin Discoloration / Swelling- Bruising and swelling normally occurs following panniculectomy. The skin in or near the surgical site can appear either lighter or darker than surrounding skin. Although uncommon, swelling and skin discoloration may persist for long periods of time and, in rare situations, may be permanent. Skin Sensitivity- Itching, tenderness, or exaggerated responses to hot or cold temperatures may occur after surgery. Usually this resolve during healing, but in some situations it may be chronic. Sutures- Most surgical techniques use deep sutures. You may notice these sutures after your surgery.   Sutures may spontaneously poke through the skin, become visible or produce irritation that requires removal.     Damage to Deeper Structures- There is the potential for injury to deeper structures including nerves, blood vessels, muscles, and lungs (pneumothorax), or intra-abdominal injury can occure during any surgical procedure. The potential for this to occur varies according to the type of procedure being performed. Injury to deeper structures may be temporary or permanent. Fat Necrosis- Fatty tissue found deep in the skin might die. This may produce areas of firmness within the skin. Additional surgery to remove areas of fat necrosis may be necessary. There is the possibility of contour irregularities in the skin that may result from fat necrosis. Obesity: If you're BMI is greater than 30 you may have a higher chance of complications. This may include but not limited to wound healing and infections. Also if you have other medical problems such as diabetes and hypertension it may effect your healing as well as your surgical results in bleeding. Umbilicus- Malposition, scarring, unacceptable appearance or loss of the umbilicus (navel) may occur. You may lose the umbilicus when this is combined with a hernia repairs, or due to the strech of the skin the umbilicus has stretched out so much that it can not be salvaged     Pubic Distortion- There will be distortion of their labia and pubic area. Additional treatment including surgery may be necessary. Even with additional surgery she may never have symmetry. At times you may have painful intercourse. Scarring-  All surgery leaves scars, some more visible than others. This surgery will leave large scars. Abnormal scars may occur within the skin and deeper tissues depending on your healing. Scars may be unattractive and of different color than surrounding skin.   Scar appearance may also vary within the same scar, exhibit contour variations or \"bunching\" due to the amount of excess skin. Scars may be asymmetrical (appear different between right and left side of the body). There is the possibility of visible marks in the skin from sutures. In some cases scars may require surgical revision or treatment. Surgical Anesthesia- Both local and general anesthesia involve risk. There is the possibility of complications, injury, and even death from all forms of surgical anesthesia or sedation     Asymmetry-  Symmetrical body appearance may not result from panniculectomy. Factors such as skin tone, fatty deposits, skeletal prominence, and muscle tone may contribute to normal asymmetry in body features. Additional surgery may be necessary to attempt to attempt to improve asymmetry. Allergic Reactions- In some cases, local allergies to tape, suture material and glues, blood products, topical preparations or injected agents have been reported. Serious systemic reactions including shock (anaphylaxis) may occur to drugs used during surgery and prescription medications. Allergic reactions may require additional treatment. Delayed Healing- Wound disruption or delayed wound healing is possible. Some areas of the abdomen may not heal normally and may take a long time to heal.  Some areas of skin or tissue may die. This may require frequent dressing changes or further surgery to remove the non-healed tissue. Smokers have a greater risk of skin loss and wound healing complications. Also patient with certain systemic disease or taking certain medications are at increased risk. Also infections under the pannus may effect your healing. Seroma- Fluid accumulations infrequently occur in between the skin and the abdominal wall. This may require additional procedures for drainage of fluid. Shock- In rare circumstances, your surgical procedure can cause severe trauma, particularly when multiple or extensive procedures are performed.   Although serious complications are infrequent, infections or excessive fluid loss can lead to severe illness and even death. If surgical shock occurs, hospitalization and additional treatment would be necessary. Surgical Wetting Solutions-There is the possibility that large volumes of fluid containing dilute local anesthetic drugs and epinephrine that is injected into fatty deposits during surgery may contribute to fluid overload or systemic reaction to these medications. Additional treatment including hospitalization may be necessary. Persistent Swelling (Lymphedema)- Persistent swelling in the legs can occur following panniculectomy. Pain- You will experience pain after your surgery. Pain of varying intensity and duration may occur and persist after panniculectomy. Chronic pain may occur very  from nerves becoming trapped in scar tissue after panniculectomy. There may be numbness that can occur after a panniculectomy this could be temporary or permanent     Unsatisfactory Result- There is no guarantee or warranty expressed or implied, on the results that may be obtained. You may be disappointed with the results of panniculectomy surgery. This would include risks such as asymmetry, unsatisfactory or highly visible surgical scar location, unacceptable visible deformities, bunching and rippling in the skin near the suture lines or at the ends of the incisions (dog ears), poor healing, wound disruption, and loss of sensation. It may not be possible to correct or improve the effects of surgical scars. In some situations, it may not be possible to achieve optimal results with a single surgical procedure. Additional surgery may be required to improve results. Deep Venous Thrombosis, Cardiac and Pulmonary Complications- Surgery, especially longer procedures, may be associated with the formation of, or increase in, blood clots in the venous system.   Pulmonary complications may occur secondarily to both blood clots (pulmonary emboli), fat deposits (fat emboli) or partial collapse of the lungs after general anesthesia. Pulmonary and fat emboli can be life-threatening or fatal in some circumstances. Air travel, inactivity and other conditions may increase the incidence of blood clots traveling to the lungs causing a major blood clot that may result in death. It is important to discuss with your physician any past history of blood clots, swollen legs or the use of estrogen or birth control pills that may contribute to this condition. Cardiac complications are a risk with any surgery and anesthesia, even in patients without symptoms. Should any of these complications occur, you may require hospitalization and additional treatment. If you experience shortness of breath, chest pains, or unusual heart beats, seek medical attention immediately. ADDITIONAL ADVISORIES     Long-Term Results- Subsequent alterations in the appearance of your body may occur as the result of aging, sun exposure, weight loss, weight gain, pregnancy, menopause or other circumstances not related to your surgery. Metabolic Status of Massive Weight Loss Patients- Your personal metabolic status of blood chemistry and protein levels may be abnormal following massive weight loss and surgical procedures to make a patient loose weight. Individuals with abnormalities may be a risk for serious medical and surgical complications, including delayed wound healing, infection or even in rare cases, death. Body-Piercing Procedures- Individuals who currently wear body-piercing jewelry or are seeking to undergo body-piercing procedures must consider the possibility that an infection could develop anytime following this procedure. Treatment including antibiotics, hospitalization or additional surgery may be necessary.             Intimate Relations After Surgery- Surgery involves coagulating of blood vessels and increased activity of any kind may open these vessels leading to a bleed, or hematoma. Increased activity that increased your pulse or heart rate may cause additional bruising, swelling, and the need for return to surgery and control bleeding. It is wise to refrain from sexual activity until your physician states it is safe. Medications-  There are many adverse reactions that occur as the result of taking over-the-counter, herbal, and/or prescription medications. Be sure to check with your physician about any drug interactions that may exist with medications that you are already taking. If you have an adverse reaction, stop the drugs immediately and call your plastic surgeon for further instructions. If the reaction is severe, go immediately to the nearest emergency room. When taking the prescribed pain medications after surgery, realize that they can affect your thought process and coordination. Do not drive, do not operate complex equipment, do not make any important decisions and do not drink any alcohol while taking these medications. Be sure to take your prescribed medication only as directed. If at blood thinners such as aspirin and Coumadin or Plavix etc. Is prescribed by a physician you must consult with the prescribing physician prior to stopping any of the blood thinners. Our focus is improvement rather than perfection. Complications or less than satisfactory results are sometimes unavoidable, may require additional surgery and often are stressful. Smoking, Second-Hand Smoke Exposure, Nicotine Products (Patch, Gum, Nasal Spray)-   Patients who are currently smoking, use tobacco products, or nicotine products (patch, gum, or nasal spray) are at a greater risk for significant surgical complications of skin dying, delayed healing, and additional scarring. Individuals exposed to second-hand smoke are also at potential risk for similar complications attributable to nicotine exposure.   Additionally, smokers may have a significant negative effect on anesthesia and recovery from anesthesia, with coughing and possibly increased bleeding. Individuals who are not exposed to tobacco smoke or nicotine-containing products have a significantly lower risk of this type of complication. It is important to refrain from smoking at least 6-8 weeks before surgery and I do not smoke for 8 weeks to 3 months after surgery. Post-bariatric patients: It is imperative that quit smoking at least 6-8 weeks before undergoing this procedure as it will adversely affect your outcome. ADDITIONAL SURGERY NECESSARY (RE-OPERATIONS)  There are many variable conditions that may influence the long-term result of surgery. Should complications occur, additional surgery or other treatments may be necessary. Secondary surgery may be necessary to obtain optimal results. Risks and complications can occur with any surgery, the risks cited are particularly associated with abdominoplasty/panniculectomy. Other complications and risks can occur but are even more uncommon. The practice of medicine and surgery is not an exact science. There is no guarantee or warranty expressed or implied, on the results that may be obtained. In some situations, it may not be possible to achieve optimal results with a single surgical procedure or even a multiple procedure. PATIENT COMPLIANCE   Follow all physician instructions carefully; this is essential for the success of your outcome. It is important that the surgical incisions are not subjected to excessive force, swelling, abrasion, or motion during the time of healing. Personal and vocational activity needs to be restricted. Protective dressings and drains should not be removed unless instructed by me. Successful post-operative function depends on both surgery and subsequent care. Physical activity that increases your pulse or heart rate may cause bruising, swelling, fluid accumulation and the need for return to surgery.   It is wise to refrain from intimate physical activities after surgery until your physician states it is safe. It is important that you participate in follow-up care, return for aftercare, and promote your recovery after surgery. FINANCIAL RESPONSIBILITIES  The cost of surgery involves several charges for the services provided. The total includes fees charged by your surgeon, the cost of surgical supplies, anesthesia, laboratory tests, and possible hospital charges, depending on where the surgery is performed. Depending on whether the cost of surgery is covered by an insurance plan, you will be responsible for necessary co-payments, deductibles, and charges not covered. The fees charged for this procedure do not include any potential future costs for additional procedures that you elect to have or require in order to revise, optimize, or complete your outcome. Additional costs may occur should complications develop from the surgery. Secondary surgery or hospital day-surgery charges involved with revision surgery will also be your responsibility. HEALTH INSURANCE  Most health insurance companies exclude coverage for cosmetic surgical operations any complications that might occur from surgery. Please carefully review your health insurance subscriber-information pamphlet or contact your insurance company for a detailed explanation of their policies for covering abdominoplasty/panniculectomy procedures. Most insurance plans may exclude coverage for secondary or revisionary surgery. ASPS consent abdominoplasty     GENERAL INFORMATION  Abdominoplasty is a surgical procedure to remove excess skin and fatty tissue from the middle and lower abdomen and to tighten muscles of the abdominal wall. Abdominoplasty is not a surgical treatment for being overweight. Weight changes will affect your body contouring results. You should not have body contouring until you have reached a stable weight.      ALTERNATIVE TREATMENTS  Alternative forms of management consist of not treating the areas of loose skin and fatty deposits. Liposuction may be a surgical alternative to abdominoplasty if there is good skin tone and localized abdominal fatty deposits in an individual of normal weight. Diet and exercise programs may be of benefit in the overall reduction of excess body fat and contour improvement. Risks and potential complications are also associated with alternative surgical forms of treatment. INHERENT RISKS OF ABDOMINOPLASTY SURGERY  Every surgical procedure involves a certain amount of risk and it is important that you understand these risks and the possible complications associated with them. In addition, every procedure has limitations. An individual's choice to undergo a surgical procedure is based on the comparison of the risk to potential benefit. SPECIFIC RISKS OF ABDOMINOPLASTY SURGERY  Change in Skin Sensation: It is common to experience diminished (or loss) of skin sensation in areas that have had surgery. Diminished (or complete loss of skin sensation) may not totally resolve after an abdominoplasty. Skin Contour Irregularities:  Contour and shape irregularities and depressions may occur after abdominoplasty. Visible and palpable wrinkling of skin can occur. Residual skin irregularities at the ends of the incisions or dog ears are always a possibility as is skin pleating when there is excessive redundant skin. This may improve with time, or it can be surgically corrected. Major Wound Separation:  Wounds may separate after surgery. Should this occur, additional treatment including surgery may be necessary. Umbilicus: Malposition, scarring, unacceptable appearance or loss of the umbilicus (navel) may occur. Pubic Distortion: It is possible, though unusual, for women to develop distortion of their labia and pubic area. Should this occur, additional treatment including surgery may be necessary.      Use of involved with healing scars from surgery such as suction-assisted lipectomy, abdominoplasty, facelifts, body lifts, and extremity surgery. While there may not be a major nerve injury, the small nerve endings during the healing period may become too active producing a painful or oversensitive area due to the small sensory nerve involved with scar tissue. Often, massage and early non-surgical intervention resolves this. It is important to discuss post-surgical pain with your surgeon. Bleeding: It is possible, to experience a bleeding episode during or after surgery. Should postoperative  bleeding occur, it may require emergency treatment to drain accumulated blood or you may  require a blood transfusion. Increased activity too soon after surgery  can lead to increased chance of bleeding and additional surgery. It is important to follow postoperative instructions and limit exercise and strenuous activity for the instructed time. Do not take any aspirin or anti-inflammatory medications for at least ten days before or after surgery, as this may increase the risk of bleeding. Non-prescription herbs and dietary supplements can increase the risk of surgical bleeding. Hematoma can occur at any time, usually in the first three weeks following injury to the operative area. If blood transfusions are necessary to treat blood loss, there is the risk of blood-related infections such as hepatitis and HIV (AIDS). Heparin medications that are used to prevent blood clots in veins can produce bleeding and decreased blood platelets. Infection:  Infection can occur after surgery. Should an infection occur, additional treatment including antibiotics, hospitalization, or additional surgery may be necessary. It is important to tell your surgeon of any other infections, such as ingrown toenail, insect bite, or urinary tract infection.  Remote infections, infection in other part of the body, may lead to an infection in the operated area.  Scarring: All surgery leaves scars, some more visible than others. Although wound healing after a surgical  procedure is expected, abnormal scars may occur within the skin and deeper tissues. Scars may be unattractive and of different color than the surrounding skin tone. Scar appearance may also vary within the same scar. Scars may be asymmetrical (appear different on the right and left side of the body). There is the possibility of visible marks in the skin from sutures. In some cases, scars may require surgical revision or treatment. Firmness:  Excessive firmness can occur after surgery due to internal scarring. The occurrence of this is not  predictable. Additional treatment including surgery may be necessary. Change in Skin Sensation: It is common to experience diminished (or loss) of skin sensation in areas that have had surgery. Diminished (or complete loss of skin sensation) may not totally resolve. Skin Contour Irregularities:  Contour and shape irregularities may occur. Visible and palpable wrinkling of skin may occur. Residual  skin irregularities at the ends of the incisions or dog ears are always a possibility when there is  excessive redundant skin. This may improve with time, or it can be surgically corrected. Skin Discoloration / Swelling:  Some bruising and swelling will normally occur. The skin in or near the surgical site can appear either lighter or darker than surrounding skin. Although uncommon, swelling and skin discoloration may persist for long periods of time and, in rare situations, may be permanent. Skin Sensitivity:  Itching, tenderness, or exaggerated responses to hot or cold temperatures may occur after surgery. Usually this resolves during healing, but in some situations it may be chronic. Major Wound Separation:  Wounds may separate after surgery. Should this occur, additional treatment including surgery may be necessary.   Sutures:  Most surgical techniques use illness and even death. If surgical shock occurs, hospitalization and additional treatment would be necessary. Pain:  You will experience pain after your surgery. Pain of varying intensity and duration may occur and persist after surgery. Chronic pain may occur very infrequently from nerves becoming trapped in scar tissue or due to tissue stretching. Cardiac and Pulmonary Complications:  Pulmonary complications may occur secondarily to blood clots (pulmonary emboli), fat deposits (fat emboli) or partial collapse of the lungs after general anesthesia. Pulmonary emboli can be life threatening or fatal in some circumstances. Inactivity and other conditions may increase the incidence of blood clots traveling to the lungs causing a major blood clot that may result in death. It is important to discuss if you have any past history of swelling in your legs or blood clots that may contribute to this condition. Cardiac complications are a risk with any surgery and anesthesia, even in patients without symptoms. If you experience shortness of breath, chest pains, or unusual heart beats, seek medical attention immediately. Should any of these complications occur, you may require hospitalization and additional treatment. Venous Thrombosis and Sequelae: Thrombosed veins, which resemble cords, occasionally develop in the area of the breast or around IV sites, and usually resolve without medical or surgical treatment. It is important to discuss with me surgeon any birth control pills you are taking. Certain high estrogen pills may increase your risk of thrombosed veins. Allergic Reactions:  In Some cases, local allergies to tape, suture material and glues, blood products, topical preparations or injected agents have been reported. Serious systemic reactions including shock (anaphylaxis) may occur in response to drugs used during surgery and prescription medicines. Allergic reactions may require additional treatment.   Drug Reactions:  Unexpected drug allergies, lack of proper response to medication, or illness caused by the prescribed drug are possibilities. It is important for you to inform your physician of any problems you have had with any medication or allergies to medication, prescribed or over the counter, as well as medications you now regularly take. Asymmetry:  Symmetrical body appearance may not result after surgery. Factors such as skin tone, fatty deposits, skeletal prominence, and muscle tone may contribute to normal asymmetry in body features. Most patients have differences between the right and left side of their bodies before any surgery is performed. Additional surgery may be necessary to attempt to diminish asymmetry. Surgical Wetting Solutions: There is the possibility that large volumes of fluid containing dilute local anesthetic drugs and epinephrine that is injected into fatty deposits during surgery may contribute to fluid overload or systemic reaction to these medications. Additional treatment including hospitalization may be necessary. Persistent Swelling (Lymphedema):  Persistent swelling can occur following surgery. Unsatisfactory Result:  Although results are expected, there is no guarantee or warranty expressed or implied, on the  results that may be obtained. The body is not asymmetric and almost everyone has some degree of unevenness which may not be recognized in advance. One side of the face may be slightly larger, one side of the face droopier. The breast and trunk area exhibit the same possibilities. Many of such issues cannot be fully corrected with surgery. The more realistic your expectations as to results, the better your results will be in your eye. Some patients never achieve their desired goals or results, at no fault of the surgeon or surgery. You may be disappointed with the results of surgery.  Asymmetry, unanticipated shape and size, loss of function, wound disruption, poor healing, and loss of sensation may occur after surgery. Size may be incorrect. Unsatisfactory surgical scar location or appearance may occur. It may be  necessary to perform additional surgery to improve your results. ADDITIONAL ADVISORIES  Smoking, Second-Hand Smoke Exposure, Nicotine Products (Patch, Gum, Nasal Spray):  Patients who are currently smoking or use tobacco or nicotine products (patch, gum, or nasal spray) are at a greater risk for significant surgical complications of skin dying and delayed healing and additional scarring. Individuals exposed to second-hand smoke are also at potential risk for similar complications attributable to nicotine exposure. Additionally, smoking may have a significant negative effect on anesthesia and recovery from anesthesia, with coughing and possibly increased bleeding. Individuals who are not exposed to tobacco smoke or nicotine-containing products have a significantly lower risk of   this type of complication. I acknowledge that I will inform my physician if I continue to smoke within this time frame, and understand that for my safety, the surgery, if possible, may be delayed. Smoking may have such a negative effect on your surgery that a urine test just before surgery may be done which will prove the presence of Nicotine. If positive, your surgery may be cancelled and your surgery,   Sleep Apnea / CPAP:   Individuals who have breathing disorders such as Obstructive Sleep Apnea and who may rely upon CPAP devices (constant positive airway pressure) or utilize nighttime oxygen are advised that they are at a substantive risk for respiratory arrest and death when they take narcotic pain medications following surgery. This is an important consideration when evaluating the safety of surgical procedures in terms of very serious complications, including death, that relate to pre-existing medical conditions.  Surgery may be considered only with monitoring afterwards in a hospital your return to normal life. Please let the surgeon know of any travel plans, important commitments already scheduled or planned, or time demands that are important to you, so that appropriate timing of surgery can occur. There are no guarantees that you will be able to resume all activities in the desired time frame. Long-Term Results:  Subsequent alterations in the appearance of your body may occur as the result of aging, sun exposure, weight loss, weight gain, pregnancy, menopause or other circumstances not related to your surgery. Body-Piercing Procedures:  Individuals who currently wear body-piercing jewelry in the surgical region are advised that an infection could develop from this activity. Pregnancy will affect her results. Intimate Relations After Surgery:  Surgery involves coagulating of blood vessels and increased activity of any kind may open these vessels leading to a bleed, or hematoma. Activity that increases your pulse or heart rate may cause additional bruising, swelling, and the need for return to surgery to control bleeding. It is wise to refrain from intimate physical activities until your physician states it is safe. Mental Health Disorders and Elective Surgery: It is important that all patients seeking to undergo elective surgery have realistic expectations that focus on improvement rather than perfection. Complications or less than satisfactory results are sometimes unavoidable, may require additional surgery and often are stressful. lthough many individuals may benefit psychologically from the results of elective  surgery, effects on mental health cannot be accurately predicted. DVT/PE Risks and Advisory: There is a risk of blood clots, Deep Vein Thrombosis (DVT) and Pulmonary Embolus (PE) with every surgical procedure. It varies with the risk factors below.  The higher the risk factors, the greater the risk and the more involved you must be in both understanding these risks and, when permitted by your physician, walking and moving your legs. There may also be leg stockings, squeezing active leg devices, and possibly medicines to help lower your risk. There are many conditions that may increase or affect risks of clotting. Inform your doctor about any past or present history of any of the following:  Past History of Blood Clots  Family History of Blood Clots  Birth Control Pills  Swollen Legs  History of Cancer  Large Dose Vitamins  Varicose Veins  Past Illnesses of the Heart, Liver, Lung, or Gastrointestinal Tract. I understand the risks relating to DVT/PE and how important it is to comply with therapy as  discussed with my surgeon. The methods of preventative therapy include:  Early ambulation when allowed  Compression devices (SCD/ICD)  ASA protocol when allowed (Aspirin)  Heparin protocol when allowed  Enoxaparin protocol when allowed  The risks of DVT/PE may be almost as great as the prophylactic therapy when involving Aspirin, Heparin,  and Exoxaparin. Be aware that if your surgery is elective, those patients with very high risks should consider not proceeding with such elective surgery. ADDITIONAL SURGERY NECESSARY (Re-Operations)  There are many variable conditions that may influence the long-term result of surgery. It is unknown how your tissue may respond or how wound healing will occur after surgery. Secondary surgery may be necessary to perform additional tightening or repositioning of body structures. Should complications occur, additional surgery or other treatments may be necessary. Even though risks and complications occur infrequently, the risks cited are particularly associated with this surgery. Other complications and risks can occur but are even more uncommon. The practice of medicine and surgery is not an exact science. Although good results are expected, there is no guarantee or warranty expressed or implied, on the results that may be obtained.  In some situations, it may not be possible to achieve optimal results with a single surgical procedure. You and your surgeon will discuss the options available should  additional surgery be advised. There may be additional costs and expenses for such additional  procedures, including surgical fees, facility and anesthesia fees, pathology and lab testing. PATIENT COMPLIANCE  Follow all physician instructions carefully; this is essential for the success of your outcome. It is important that the surgical incisions are not subjected to excessive force, swelling, abrasion, or motion during the time of healing. Personal and vocational activity needs to be restricted. Protective dressings and drains should not be removed unless instructed. Successful post-operative function depends on both surgery and subsequent care. Physical activity that increases your pulse or heart rate may cause bruising, swelling, fluid accumulation and the need for return to surgery. It is wise to refrain from intimate physical activities after surgery until your physician states it is safe. It is important that you participate in follow-up care, return for aftercare, and promote your recovery after surgery. Electronically signed by:  Jose Hirsch MD 7/21/2021            Office Visit on 7/21/2021    Office Visit on 7/21/2021      Detailed Report    Note shared with patient  Progress Notes Info    Author Note Status Last Update User   Jose Hirsch MD Signed Jose Hirsch MD   Last Update Date/Time: 7/21/2021  1:02 PM   Chart Review Routing History    No routing history on file.

## 2021-10-19 NOTE — OP NOTE
Operative Note      Patient: Lani Saravia  YOB: 1975  MRN: [id number]    Date of Procedure: 10/19/2021    Pre-Op Diagnosis: DX DIASTASIS RECTUS, SYMPTOMATIC ABDOMINAL PANNUS    Post-Op Diagnosis: Same       Procedure(s):  PANNICULECTOMY  ABDOMINOPLASTY,     Surgeon(s):  Olive Valadez MD    Assistant:   First Assistant: Jared Stroud    Anesthesia: General    Estimated Blood Loss (mL): less than 836     Complications: None    Specimens:   ID Type Source Tests Collected by Time Destination   A : PANNUS Tissue Abdomen SURGICAL PATHOLOGY Olive Valadez MD 10/19/2021 1140        Weight at the pannus was 5.5 pounds. Drains:   Closed/Suction Drain Right;Ventral Hip Bulb 19 [place] (Active)   Site Description Unable to view 10/19/21 1238   Dressing Status Clean;Dry; Intact 10/19/21 1238   Drainage Appearance Bloody 10/19/21 1238   Status Compressed 10/19/21 1238       Closed/Suction Drain Left;Ventral Hip Bulb 19 [place] (Active)   Site Description Unable to view 10/19/21 1238   Dressing Status Clean;Dry; Intact 10/19/21 1238   Drainage Appearance Bloody 10/19/21 1238   Status Compressed 10/19/21 1238       Urethral Catheter Non-latex;Straight-tip 16 fr (Active)   Urine Color Yellow 10/19/21 1044   Urine Appearance Clear 10/19/21 1044                   INDICATION FOR PROCEDURE:  The patient is 48 y.o. female . All the risks, benefits, and alternative treatments including but not limited to infection, bleeding, loss of umbilicus, wound healing issues, need for blood transfusion, and increased risk for all surgical aspect due to her multiple medical problems were explained to the patient and an informed consent was obtained. DESCRIPTION OF PROCEDURE:  The patient was marked in the holding area. The midlines were marked. The amount of resection was also marked. Procedure: The patient was brought into the room.   SCDs were placed and turned on prior to induction of anesthesia via endotracheal intubation. Then a Ramirez catheter was placed. Then all areas were prepped and draped in usual customary sterile manner. After all the areas were prepped and draped in usual customary sterile manner, a time-out was performed and everybody in the room was in agreement with the time-out. Then, an incision was made over the inferior border of the incision in the horizontal manner. This was done using #10 blade, then a cautery was used, and dissection was continued in the following manner: To the subcutaneous tissue through the Amarilys's to the anterior abdominal wall. Then, the dissection was continued cephalad until the superior markings were made on the right and the left of the umbilicus. Then an incision was made circumferentially around the umbilicus. This was done using a #15 blade. Then dissection was made circumferentially around the umbilicus all the way down to the anterior abdominal wall. Then dissection was continued cephalad to the rib cages bilaterally and down xiphoid centrally. Then the patient was placed in approximately a 30-degree position. The excess skin was excised. Then it was noted there was a rectus diastasis present. The rectus diastases was marked. Then the area was irrigated. All bleeding points were identified were cauterized. Then using an #0 looped PDS the superior part was imbricated to the umbilicus. Then an 3-0 Vicryl was placed at the 12:00 position of the umbilicus. Then a #0 looped PDS was used. The area in the infraumbilical area was imbricated. Then #19 Blakes were placed and brought out through a separate incision. Then the position of the umbilicus was marked. Then the suture line was stapled. Then on 2-0 Vicryl was used in an interrupted manner and after 2-0 Vicryl was placed in an interrupted manner in amarilys's fascia. Then 2-0 Vicryl was used in an interrupted manner in the deep tissue.   Then 2-0 strata fix was used in the deep dermal.  The umbilicus was brought out through the previously marked area. It was sutured in place using 3-0 Vicryl in the deep tissue. Then 3-0 strata fix was used on the skin in the periumbilical area. 2-0 Prolene was also used on the drain site. The Mastisol and Steri-Strips were placed. Then ABDs were placed and a abdominal binder was placed. The patient tolerated procedure well. Patient was transferred to recovery room in stable condition.      Electronically signed by Rosalinda Heredia MD on 10/19/2021 at 12:51 PM

## 2021-10-20 ENCOUNTER — TELEPHONE (OUTPATIENT)
Dept: SURGERY | Age: 46
End: 2021-10-20

## 2021-10-20 LAB — SURGICAL PATHOLOGY REPORT: NORMAL

## 2021-10-20 NOTE — TELEPHONE ENCOUNTER
Pt had surgery with Zhang Silva yesterday, and now has what she believes is a UTI. Patient is not sure if it's from the medication she was put on, or the catheter she had in for surgery. Patient would like a call back from clinical regarding this.

## 2021-10-21 ENCOUNTER — TELEPHONE (OUTPATIENT)
Dept: SURGERY | Age: 46
End: 2021-10-21

## 2021-10-21 NOTE — TELEPHONE ENCOUNTER
Patient not having much drainage in the right drain. Surgery 10/19/21  Patient feels she has UTI with yeast infection. Can she get something for this?

## 2021-10-23 ENCOUNTER — HOSPITAL ENCOUNTER (EMERGENCY)
Age: 46
Discharge: HOME | End: 2021-10-23
Attending: EMERGENCY MEDICINE
Payer: COMMERCIAL

## 2021-10-23 ENCOUNTER — APPOINTMENT (OUTPATIENT)
Dept: GENERAL RADIOLOGY | Age: 46
End: 2021-10-23
Payer: COMMERCIAL

## 2021-10-23 VITALS
BODY MASS INDEX: 29.27 KG/M2 | DIASTOLIC BLOOD PRESSURE: 100 MMHG | WEIGHT: 155 LBS | RESPIRATION RATE: 17 BRPM | TEMPERATURE: 98.6 F | HEIGHT: 61 IN | OXYGEN SATURATION: 98 % | HEART RATE: 86 BPM | SYSTOLIC BLOOD PRESSURE: 133 MMHG

## 2021-10-23 DIAGNOSIS — Z48.89 ENCOUNTER FOR POST SURGICAL WOUND CHECK: Primary | ICD-10-CM

## 2021-10-23 DIAGNOSIS — R05.9 COUGH: ICD-10-CM

## 2021-10-23 LAB
ABSOLUTE EOS #: 0 K/UL (ref 0–0.44)
ABSOLUTE IMMATURE GRANULOCYTE: 0.08 K/UL (ref 0–0.3)
ABSOLUTE LYMPH #: 0.68 K/UL (ref 1.1–3.7)
ABSOLUTE MONO #: 0.23 K/UL (ref 0.1–1.2)
ALBUMIN SERPL-MCNC: 3.8 G/DL (ref 3.5–5.2)
ALBUMIN/GLOBULIN RATIO: ABNORMAL (ref 1–2.5)
ALP BLD-CCNC: 47 U/L (ref 35–104)
ALT SERPL-CCNC: 16 U/L (ref 5–33)
ANION GAP SERPL CALCULATED.3IONS-SCNC: 11 MMOL/L (ref 9–17)
AST SERPL-CCNC: 21 U/L
BASOPHILS # BLD: 0 % (ref 0–2)
BASOPHILS ABSOLUTE: 0 K/UL (ref 0–0.2)
BILIRUB SERPL-MCNC: 0.25 MG/DL (ref 0.3–1.2)
BUN BLDV-MCNC: 13 MG/DL (ref 6–20)
BUN/CREAT BLD: 24 (ref 9–20)
CALCIUM SERPL-MCNC: 8.8 MG/DL (ref 8.6–10.4)
CHLORIDE BLD-SCNC: 103 MMOL/L (ref 98–107)
CO2: 25 MMOL/L (ref 20–31)
CREAT SERPL-MCNC: 0.55 MG/DL (ref 0.5–0.9)
DIFFERENTIAL TYPE: ABNORMAL
EOSINOPHILS RELATIVE PERCENT: 0 % (ref 1–4)
GFR AFRICAN AMERICAN: >60 ML/MIN
GFR NON-AFRICAN AMERICAN: >60 ML/MIN
GFR SERPL CREATININE-BSD FRML MDRD: ABNORMAL ML/MIN/{1.73_M2}
GFR SERPL CREATININE-BSD FRML MDRD: ABNORMAL ML/MIN/{1.73_M2}
GLUCOSE BLD-MCNC: 102 MG/DL (ref 70–99)
HCT VFR BLD CALC: 33.6 % (ref 36.3–47.1)
HEMOGLOBIN: 11 G/DL (ref 11.9–15.1)
IMMATURE GRANULOCYTES: 1 %
LYMPHOCYTES # BLD: 9 % (ref 24–43)
MCH RBC QN AUTO: 32.4 PG (ref 25.2–33.5)
MCHC RBC AUTO-ENTMCNC: 32.7 G/DL (ref 28.4–34.8)
MCV RBC AUTO: 99.1 FL (ref 82.6–102.9)
MONOCYTES # BLD: 3 % (ref 3–12)
NRBC AUTOMATED: 0 PER 100 WBC
PDW BLD-RTO: 13.3 % (ref 11.8–14.4)
PLATELET # BLD: 275 K/UL (ref 138–453)
PLATELET ESTIMATE: ABNORMAL
PMV BLD AUTO: 9.3 FL (ref 8.1–13.5)
POTASSIUM SERPL-SCNC: 4.2 MMOL/L (ref 3.7–5.3)
RBC # BLD: 3.39 M/UL (ref 3.95–5.11)
RBC # BLD: ABNORMAL 10*6/UL
SEG NEUTROPHILS: 87 % (ref 36–65)
SEGMENTED NEUTROPHILS ABSOLUTE COUNT: 6.51 K/UL (ref 1.5–8.1)
SODIUM BLD-SCNC: 139 MMOL/L (ref 135–144)
TOTAL PROTEIN: 6.2 G/DL (ref 6.4–8.3)
WBC # BLD: 7.5 K/UL (ref 3.5–11.3)
WBC # BLD: ABNORMAL 10*3/UL

## 2021-10-23 PROCEDURE — 71045 X-RAY EXAM CHEST 1 VIEW: CPT

## 2021-10-23 PROCEDURE — 80053 COMPREHEN METABOLIC PANEL: CPT

## 2021-10-23 PROCEDURE — 85025 COMPLETE CBC W/AUTO DIFF WBC: CPT

## 2021-10-23 PROCEDURE — 99283 EMERGENCY DEPT VISIT LOW MDM: CPT

## 2021-10-23 RX ORDER — BENZONATATE 100 MG/1
100 CAPSULE ORAL 2 TIMES DAILY PRN
Qty: 20 CAPSULE | Refills: 0 | Status: SHIPPED | OUTPATIENT
Start: 2021-10-23 | End: 2021-10-30

## 2021-10-23 RX ORDER — GUAIFENESIN 600 MG/1
600 TABLET, EXTENDED RELEASE ORAL 2 TIMES DAILY
Qty: 10 TABLET | Refills: 0 | Status: SHIPPED | OUTPATIENT
Start: 2021-10-23 | End: 2021-10-28

## 2021-10-23 ASSESSMENT — ENCOUNTER SYMPTOMS
EYES NEGATIVE: 1
SHORTNESS OF BREATH: 0
COLOR CHANGE: 0
CONSTIPATION: 0
ABDOMINAL DISTENTION: 0
COUGH: 1
SINUS PAIN: 0
APNEA: 0
CHEST TIGHTNESS: 0
DIARRHEA: 0
VOMITING: 0

## 2021-10-23 ASSESSMENT — PAIN DESCRIPTION - PAIN TYPE: TYPE: ACUTE PAIN

## 2021-10-23 ASSESSMENT — PAIN SCALES - GENERAL: PAINLEVEL_OUTOF10: 10

## 2021-10-23 NOTE — ED PROVIDER NOTES
EMERGENCY DEPARTMENT ENCOUNTER    Pt Name: Derick Ayala  MRN: [id number]  [place] 1975  Date of evaluation: 10/23/21  CHIEF COMPLAINT       Chief Complaint   Patient presents with    Fever     Pt reports subjective fever since yesterday. Pt reports that she had tummy tuck Tuesday here     Wound Check    Pruritis     Pt reports that she has been itching and wants to cough d/t itching, but reports abdominal / incisional pain when coughing      HISTORY OF PRESENT ILLNESS   78-year-old female presents emergency room for diaphoresis and chills. Symptoms have been going on for about 2 days. Patient had an Panniculectomy and abdominoplasty on 10/19/2021. Patient is concerned because of the chills and sweats. She believes she is developing an upper respiratory infection. She states that she has an itch in the back of the throat. She did cough and sneeze today causing her a lot of pain in the lower abdomen from her incision site. She was concerned that she may have damaged the incision. She reports that she did not have a thermometer at home and was not able to take her temperature but felt that she had a fever. She did take Tylenol earlier today. Patient is afebrile here in the emergency room. REVIEW OF SYSTEMS     Review of Systems   Constitutional: Positive for diaphoresis. Negative for activity change and chills. HENT: Positive for postnasal drip. Negative for congestion, sinus pain and tinnitus. Eyes: Negative. Respiratory: Positive for cough. Negative for apnea, chest tightness and shortness of breath. Gastrointestinal: Negative for abdominal distention, constipation, diarrhea and vomiting. Genitourinary: Negative for difficulty urinating and frequency. Musculoskeletal: Negative for arthralgias and myalgias. Skin: Negative for color change and rash. Neurological: Negative for dizziness. Hematological: Negative. Psychiatric/Behavioral: Negative.         PASTMEDICAL HISTORY     Past Medical History:   Diagnosis Date    Bell's palsy     Bipolar 1 disorder, depressed ([facility] [address] 2018    GERD (gastroesophageal reflux disease)     HTN (hypertension)     Iron (Fe) deficiency anemia     Obesity     Post traumatic stress disorder     Post-nasal drip     Seasonal allergies      Past Problem List  Patient Active Problem List   Diagnosis Code    Seasonal allergies J30.2    HTN (hypertension) I10    Iron (Fe) deficiency anemia D50.9    Obesity E66.9    GERD (gastroesophageal reflux disease) K21.9    Bell's palsy G51.0    Post-nasal drip R09.82    Nasal obstruction J34.89    Nasal congestion R09.81    Raynaud's phenomenon without gangrene I73.00    History of low transverse  section Z98.891    Bipolar 1 disorder, depressed ([facility] [address] F31.9    Post traumatic stress disorder F43.10    Primary insomnia F51.01    Excess skin of abdomen L98.7    Major depressive disorder, recurrent episode, mild (HCC) F33.0    Chronic pain of right knee M25.561, G89.29    Right hip pain M25.551    Neck pain on right side M54.2    History of sinus surgery Z98.890    Episodic overuse of medication Z91.14    S/P bilateral breast reduction Z98.890    Rectus diastasis M62.08    Symptomatic abdominal panniculus E65     SURGICAL HISTORY       Past Surgical History:   Procedure Laterality Date    BREAST REDUCTION SURGERY       SECTION      Twice    LIPECTOMY N/A 10/19/2021    PANNICULECTOMY/ABDOMINOPLASTY, TAP BLOCK, INCISIONAL WOUND VAC, BIOPATCH performed by Kaur Corbin MD at [facility].        Previous Medications    ADAPALENE-BENZOYL PEROXIDE 0.1-2.5 % GEL    daily    ARIPIPRAZOLE (ABILIFY) 20 MG TABLET    take 1 tablet by mouth every morning    ASPIRIN 81 MG EC TABLET    Take 81 mg by mouth daily    BACLOFEN (LIORESAL) 10 MG TABLET    Take 1 tablet by mouth 3 times daily    BENZTROPINE (COGENTIN) 2 MG TABLET    take 1 General: She is not in acute distress. Appearance: She is well-developed. HENT:      Head: Normocephalic. Eyes:      Pupils: Pupils are equal, round, and reactive to light. Cardiovascular:      Rate and Rhythm: Normal rate and regular rhythm. Heart sounds: Normal heart sounds. Pulmonary:      Effort: Pulmonary effort is normal. No respiratory distress. Breath sounds: Normal breath sounds. Abdominal:      General: Bowel sounds are normal.      Palpations: Abdomen is soft. Tenderness: There is no abdominal tenderness. Comments: Small umbilical incision  Large lower abdominal incision  Steri-Strips are covering both incisions. There is not appear to be any bleeding or drainage. There is no significant warmth swelling or redness to the skin  Patient has drains on both sides of the incision that are in place and have light red drainage in the bags   Musculoskeletal:         General: Normal range of motion. Skin:     General: Skin is warm and dry. Neurological:      Mental Status: She is alert and oriented to person, place, and time. 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 Patient is nontoxic and well-appearing. She has clear lung sounds on exam.  She has appropriately tender abdomen for postop. Incision appears to be healing appropriately there is no bleeding or drainage. The skin is not warm or red surrounding the incisions. Patient's blood work is within acceptable limits. Given history patient clinically sounds as though she is developing URI. Chest x-ray is unremarkable. I have low suspicion for emergent condition. No criteria at this time for hospitalization. Patient given medications for cough at home and instructed to follow-up with general surgery at her postop appointment.     CRITICAL CARE:       PROCEDURES:    Procedures    DIAGNOSTIC RESULTS   EKG:All EKG's are interpreted by the Emergency Department Physician who either signs or Co-signs this chart in the absence of a cardiologist.        RADIOLOGY:All plain film, CT, MRI, and formal ultrasound images (except ED bedside ultrasound) are read by the radiologist, see reports below, unless otherwisenoted in MDM or here. XR CHEST PORTABLE   Final Result   No significant abnormalities detected. LABS: All lab results were reviewed by myself, and all abnormals are listed below. Labs Reviewed   CBC WITH AUTO DIFFERENTIAL - Abnormal; Notable for the following components:       Result Value    RBC 3.39 (*)     Hemoglobin 11.0 (*)     Hematocrit 33.6 (*)     Seg Neutrophils 87 (*)     Lymphocytes 9 (*)     Eosinophils % 0 (*)     Immature Granulocytes 1 (*)     Absolute Lymph # 0.68 (*)     All other components within normal limits   COMPREHENSIVE METABOLIC PANEL W/ REFLEX TO MG FOR LOW K - Abnormal; Notable for the following components:    Glucose 102 (*)     Bun/Cre Ratio 24 (*)     Total Bilirubin 0.25 (*)     Total Protein 6.2 (*)     All other components within normal limits       EMERGENCY DEPARTMENTCOURSE:         Vitals:    Vitals:    10/23/21 1320   BP: (!) 133/100   Pulse: 86   Resp: 17   Temp: 98.6 °F (37 °C)   TempSrc: Oral   SpO2: 98%   Weight: 155 lb (70.3 kg)   Height: 5' 1\" (1.549 m)       The patient was given the following medications while in the emergency department:  Orders Placed This Encounter   Medications    benzonatate (TESSALON) 100 MG capsule     Sig: Take 1 capsule by mouth 2 times daily as needed for Cough     Dispense:  20 capsule     Refill:  0    guaiFENesin (MUCINEX) 600 MG extended release tablet     Sig: Take 1 tablet by mouth 2 times daily for 5 days     Dispense:  10 tablet     Refill:  0     CONSULTS:  None    FINAL IMPRESSION      1. Encounter for post surgical wound check    2.  Cough          DISPOSITION/PLAN   DISPOSITION        PATIENT REFERRED TO:  No follow-up provider specified. DISCHARGE MEDICATIONS:  New Prescriptions    BENZONATATE (TESSALON) 100 MG CAPSULE    Take 1 capsule by mouth 2 times daily as needed for Cough    GUAIFENESIN (MUCINEX) 600 MG EXTENDED RELEASE TABLET    Take 1 tablet by mouth 2 times daily for 5 days     Yina Alvarado MD  Attending Emergency Physician      Care during this encounter was due to an unprecedented national emergency due to COVID-19.             Brett Seo MD  10/23/21 0442

## 2021-10-23 NOTE — FLOWSHEET NOTE
Surgical dressings and abd binder reapplied discharge instructions reviewed pt released ambulatory in nad

## 2021-10-23 NOTE — FLOWSHEET NOTE
Pt arrives ambulatory with c/o possible fever pt states she had tummy tuck last week and states she knows she had a fever last night and she is concerned about a post surgical infection also c/o itching along incision line upon exam pt lert and in nad surgical dressing intact

## 2021-10-25 ENCOUNTER — TELEPHONE (OUTPATIENT)
Dept: FAMILY MEDICINE CLINIC | Age: 46
End: 2021-10-25

## 2021-10-25 DIAGNOSIS — I10 HTN, GOAL BELOW 130/80: ICD-10-CM

## 2021-10-25 RX ORDER — TRIAMTERENE AND HYDROCHLOROTHIAZIDE 37.5; 25 MG/1; MG/1
TABLET ORAL
Qty: 90 TABLET | Refills: 1 | Status: SHIPPED | OUTPATIENT
Start: 2021-10-25 | End: 2021-12-16 | Stop reason: SDUPTHER

## 2021-10-25 RX ORDER — LISINOPRIL 40 MG/1
TABLET ORAL
Qty: 90 TABLET | Refills: 1 | Status: SHIPPED | OUTPATIENT
Start: 2021-10-25 | End: 2021-12-16 | Stop reason: SDUPTHER

## 2021-10-25 NOTE — TELEPHONE ENCOUNTER
Please Approve or Refuse.   Send to Pharmacy per Pt's Request:      Next Visit Date:  12/16/2021   Last Visit Date: 10/7/2021    Hemoglobin A1C (%)   Date Value   04/12/2021 5.1             ( goal A1C is < 7)   BP Readings from Last 3 Encounters:   10/23/21 (!) 133/100   10/19/21 123/84   10/19/21 (!) 133/90          (goal 120/80)  BUN   Date Value Ref Range Status   10/23/2021 13 6 - 20 mg/dL Final     CREATININE   Date Value Ref Range Status   10/23/2021 0.55 0.50 - 0.90 mg/dL Final     Potassium   Date Value Ref Range Status   10/23/2021 4.2 3.7 - 5.3 mmol/L Final

## 2021-10-25 NOTE — TELEPHONE ENCOUNTER
[facility] ([facility]) ED Follow up Call    Reason for ED visit:   COUGH/FEVER/SURGICAL WOUND CHECK/PAIN    FU appts/Provider:    Future Appointments   Date Time Provider Elbert Ariza   10/27/2021 10:45 AM Elke Dumont MD [email or web address]   12/16/2021  8:45 AM Maira Montes MD  [id number]       VOICEMAIL DOCUMENTATION - ERASE IF NOT USED  Hi, this message is for  [facility]  This is RUSS from [place] office. Just calling to see how you are doing after your recent visit to the Emergency Room. [facility] wants to make sure you were able to fill any prescriptions and that you understand your discharge instructions. Please return our call if you need to make a follow up appointment with your provider or have any further needs. Our phone number is [phone] Have a great day.

## 2021-10-27 ENCOUNTER — OFFICE VISIT (OUTPATIENT)
Dept: SURGERY | Age: 46
End: 2021-10-27

## 2021-10-27 VITALS
BODY MASS INDEX: 29.27 KG/M2 | OXYGEN SATURATION: 100 % | HEART RATE: 90 BPM | WEIGHT: 155 LBS | HEIGHT: 61 IN | RESPIRATION RATE: 12 BRPM

## 2021-10-27 DIAGNOSIS — Z09 POSTOPERATIVE EXAMINATION: Primary | ICD-10-CM

## 2021-10-27 PROCEDURE — 99024 POSTOP FOLLOW-UP VISIT: CPT | Performed by: PLASTIC SURGERY

## 2021-10-27 NOTE — PROGRESS NOTES
[facility]  [address] [place] [address],[address]  [address] [facility] [address]       OFFICE POST-OP NOTE    Patient Name:  Marianna Crook    :  1975    MRN:  [id number]  STATUS POST  Chief Complaint   Patient presents with    Post-Op Check     panniculectomy DOS 10/19/21       SUBJECTIVE  Patient seen and examined. Patient status post panniculectomy abdominoplasty on 10/1921      PHYSICAL EXAM  Vital Signs:  Pulse 90   Resp 12   Ht 5' 1\" (1.549 m)   Wt 155 lb (70.3 kg)   LMP 10/10/2021   SpO2 100%   BMI 29.29 kg/m²     Incisions:  Suture line clean dry and intact. Umbilicus appears to be viable. Skin:  No evidence of infection. Neurologic:  Alert & oriented x 3. ASSESSMENT   Diagnosis Orders   1. Postoperative examination         PLAN  1. We will place ABD in the binder. Continue drain care. Follow-up in 1 to 2 weeks. Plan discussed with patient.     Electronically signed by:  Ora Dakin, M.D.   10/27/2021

## 2021-11-10 ENCOUNTER — OFFICE VISIT (OUTPATIENT)
Dept: SURGERY | Age: 46
End: 2021-11-10

## 2021-11-10 ENCOUNTER — TELEPHONE (OUTPATIENT)
Dept: SURGERY | Age: 46
End: 2021-11-10

## 2021-11-10 VITALS — HEIGHT: 61 IN | WEIGHT: 155 LBS | RESPIRATION RATE: 12 BRPM | BODY MASS INDEX: 29.27 KG/M2

## 2021-11-10 DIAGNOSIS — Z09 POSTOPERATIVE EXAMINATION: Primary | ICD-10-CM

## 2021-11-10 PROCEDURE — 99024 POSTOP FOLLOW-UP VISIT: CPT | Performed by: PLASTIC SURGERY

## 2021-11-10 NOTE — PROGRESS NOTES
[facility]  [address] [place] [address],[address]  [address] [facility] [address]       OFFICE POST-OP NOTE    Patient Name:  Shirin Brown    :  1975    MRN:  [id number]  STATUS POST  Chief Complaint   Patient presents with    Panniculectomy     DOS- 10/19/21       SUBJECTIVE  Patient seen and examined. Patient status post panniculectomy abdominoplasty on 10/1921      PHYSICAL EXAM  Vital Signs:  Resp 12   Ht 5' 1\" (1.549 m)   Wt 155 lb (70.3 kg)   BMI 29.29 kg/m²     Incisions:  Suture line clean dry and intact. Umbilicus appears to be viable. It has a large dogear on the left. There is some asymmetry of the scar. Skin:  No evidence of infection. Neurologic:  Alert & oriented x 3. ASSESSMENT   Diagnosis Orders   1. Postoperative examination         PLAN  We discussed liposuction of the dogear. I discussed with the patient that liposuction may not be covered. We also discussed scar revision. Discussed with the patient we would like to give her 3 months for things to settle down. Plan discussed with patient.     Electronically signed by:  Ramon Ocampo M.D.   11/10/2021

## 2021-11-10 NOTE — TELEPHONE ENCOUNTER
Surgery Scheduled  2/17/22  11:00  [facility]  Liposuction with scar revision  PAT: Phone call  Vaccinated  Patient advised by phone and mail

## 2021-11-30 ENCOUNTER — OFFICE VISIT (OUTPATIENT)
Dept: SURGERY | Age: 46
End: 2021-11-30

## 2021-11-30 VITALS
WEIGHT: 155 LBS | HEART RATE: 90 BPM | RESPIRATION RATE: 12 BRPM | HEIGHT: 61 IN | OXYGEN SATURATION: 100 % | BODY MASS INDEX: 29.27 KG/M2

## 2021-11-30 DIAGNOSIS — Z09 POSTOPERATIVE EXAMINATION: Primary | ICD-10-CM

## 2021-11-30 PROCEDURE — 99024 POSTOP FOLLOW-UP VISIT: CPT | Performed by: PLASTIC SURGERY

## 2021-11-30 NOTE — PROGRESS NOTES
[facility]  [address] [place] [address],[address]  [address] [facility] [address]       OFFICE POST-OP NOTE    Patient Name:  Kellen Truong    :  1975    MRN:  [id number]  STATUS POST  Chief Complaint   Patient presents with    Post-Op Check     panniculectomy DOS 21       SUBJECTIVE  Patient seen and examined. Patient status post panniculectomy abdominoplasty on 10/1921      PHYSICAL EXAM  Vital Signs:  Pulse 90   Resp 12   Ht 5' 1\" (1.549 m)   Wt 155 lb (70.3 kg)   SpO2 100%   BMI 29.29 kg/m²     Incisions:  Suture line clean dry and intact. Umbilicus appears to be viable. It has a large dogear on the left. There is some asymmetry of the scar. Skin:  No evidence of infection. Neurologic:  Alert & oriented x 3. ASSESSMENT   Diagnosis Orders   1. Postoperative examination         PLAN  We discussed liposuction of the dogear. I discussed with the patient that liposuction may not be covered. We also discussed scar revision. Discussed with the patient we would like to give her 3 months for things to settle down. So discussed that the patient may need additional revisions. Patient demonstrated understanding. I will see her in a month. Patient has 2 sutures. We will remove these 2 sutures. Plan discussed with patient.     Electronically signed by:  Eboni Patel M.D.   2021

## 2021-12-16 ENCOUNTER — OFFICE VISIT (OUTPATIENT)
Dept: FAMILY MEDICINE CLINIC | Age: 46
End: 2021-12-16
Payer: COMMERCIAL

## 2021-12-16 VITALS
DIASTOLIC BLOOD PRESSURE: 80 MMHG | WEIGHT: 168 LBS | TEMPERATURE: 97.8 F | SYSTOLIC BLOOD PRESSURE: 130 MMHG | HEIGHT: 61 IN | BODY MASS INDEX: 31.72 KG/M2 | OXYGEN SATURATION: 98 % | HEART RATE: 76 BPM

## 2021-12-16 DIAGNOSIS — J01.90 ACUTE BACTERIAL SINUSITIS: ICD-10-CM

## 2021-12-16 DIAGNOSIS — F31.9 BIPOLAR 1 DISORDER, DEPRESSED (HCC): ICD-10-CM

## 2021-12-16 DIAGNOSIS — F43.10 POST TRAUMATIC STRESS DISORDER: ICD-10-CM

## 2021-12-16 DIAGNOSIS — Z00.00 PREVENTATIVE HEALTH CARE: ICD-10-CM

## 2021-12-16 DIAGNOSIS — B96.89 ACUTE BACTERIAL SINUSITIS: ICD-10-CM

## 2021-12-16 DIAGNOSIS — F51.01 PRIMARY INSOMNIA: ICD-10-CM

## 2021-12-16 DIAGNOSIS — Z12.11 COLON CANCER SCREENING: ICD-10-CM

## 2021-12-16 DIAGNOSIS — I10 PRIMARY HYPERTENSION: Primary | ICD-10-CM

## 2021-12-16 DIAGNOSIS — E66.09 CLASS 1 OBESITY DUE TO EXCESS CALORIES WITH SERIOUS COMORBIDITY AND BODY MASS INDEX (BMI) OF 31.0 TO 31.9 IN ADULT: ICD-10-CM

## 2021-12-16 DIAGNOSIS — K21.9 GASTROESOPHAGEAL REFLUX DISEASE, UNSPECIFIED WHETHER ESOPHAGITIS PRESENT: ICD-10-CM

## 2021-12-16 PROCEDURE — G8484 FLU IMMUNIZE NO ADMIN: HCPCS | Performed by: FAMILY MEDICINE

## 2021-12-16 PROCEDURE — G0439 PPPS, SUBSEQ VISIT: HCPCS | Performed by: FAMILY MEDICINE

## 2021-12-16 RX ORDER — FLUTICASONE PROPIONATE 50 MCG
2 SPRAY, SUSPENSION (ML) NASAL DAILY
Qty: 16 G | Refills: 0 | Status: SHIPPED | OUTPATIENT
Start: 2021-12-16

## 2021-12-16 RX ORDER — TRIAMTERENE AND HYDROCHLOROTHIAZIDE 37.5; 25 MG/1; MG/1
TABLET ORAL
Qty: 90 TABLET | Refills: 1 | Status: SHIPPED | OUTPATIENT
Start: 2021-12-16 | End: 2022-03-30 | Stop reason: SDUPTHER

## 2021-12-16 RX ORDER — LISINOPRIL 40 MG/1
TABLET ORAL
Qty: 90 TABLET | Refills: 1 | Status: SHIPPED | OUTPATIENT
Start: 2021-12-16 | End: 2022-03-30 | Stop reason: SDUPTHER

## 2021-12-16 RX ORDER — AZITHROMYCIN 250 MG/1
TABLET, FILM COATED ORAL
Qty: 6 TABLET | Refills: 0 | Status: SHIPPED | OUTPATIENT
Start: 2021-12-16 | End: 2021-12-21

## 2021-12-16 SDOH — ECONOMIC STABILITY: INCOME INSECURITY: IN THE LAST 12 MONTHS, WAS THERE A TIME WHEN YOU WERE NOT ABLE TO PAY THE MORTGAGE OR RENT ON TIME?: NO

## 2021-12-16 SDOH — ECONOMIC STABILITY: FOOD INSECURITY: WITHIN THE PAST 12 MONTHS, YOU WORRIED THAT YOUR FOOD WOULD RUN OUT BEFORE YOU GOT MONEY TO BUY MORE.: NEVER TRUE

## 2021-12-16 SDOH — ECONOMIC STABILITY: TRANSPORTATION INSECURITY
IN THE PAST 12 MONTHS, HAS THE LACK OF TRANSPORTATION KEPT YOU FROM MEDICAL APPOINTMENTS OR FROM GETTING MEDICATIONS?: NO

## 2021-12-16 SDOH — ECONOMIC STABILITY: HOUSING INSECURITY
IN THE LAST 12 MONTHS, WAS THERE A TIME WHEN YOU DID NOT HAVE A STEADY PLACE TO SLEEP OR SLEPT IN A SHELTER (INCLUDING NOW)?: NO

## 2021-12-16 SDOH — ECONOMIC STABILITY: FOOD INSECURITY: WITHIN THE PAST 12 MONTHS, THE FOOD YOU BOUGHT JUST DIDN'T LAST AND YOU DIDN'T HAVE MONEY TO GET MORE.: NEVER TRUE

## 2021-12-16 SDOH — ECONOMIC STABILITY: TRANSPORTATION INSECURITY
IN THE PAST 12 MONTHS, HAS LACK OF TRANSPORTATION KEPT YOU FROM MEETINGS, WORK, OR FROM GETTING THINGS NEEDED FOR DAILY LIVING?: NO

## 2021-12-16 ASSESSMENT — ENCOUNTER SYMPTOMS
PHOTOPHOBIA: 0
CHEST TIGHTNESS: 0
RHINORRHEA: 0
SINUS PAIN: 0
SORE THROAT: 0
COLOR CHANGE: 0
BACK PAIN: 0
WHEEZING: 0
BLOOD IN STOOL: 0
ABDOMINAL DISTENTION: 0
COUGH: 0
ANAL BLEEDING: 0
SINUS PRESSURE: 0
DIARRHEA: 0
NAUSEA: 0
SHORTNESS OF BREATH: 0

## 2021-12-16 ASSESSMENT — PATIENT HEALTH QUESTIONNAIRE - PHQ9
SUM OF ALL RESPONSES TO PHQ QUESTIONS 1-9: 0
2. FEELING DOWN, DEPRESSED OR HOPELESS: 0
1. LITTLE INTEREST OR PLEASURE IN DOING THINGS: 0
SUM OF ALL RESPONSES TO PHQ9 QUESTIONS 1 & 2: 0
SUM OF ALL RESPONSES TO PHQ QUESTIONS 1-9: 0
SUM OF ALL RESPONSES TO PHQ QUESTIONS 1-9: 0

## 2021-12-16 ASSESSMENT — SOCIAL DETERMINANTS OF HEALTH (SDOH): HOW HARD IS IT FOR YOU TO PAY FOR THE VERY BASICS LIKE FOOD, HOUSING, MEDICAL CARE, AND HEATING?: NOT HARD AT ALL

## 2021-12-16 NOTE — PROGRESS NOTES
2021      Shelbi Patton (:  1975) is a 48 y.o. female, here for a preventive medicine evaluation, Annual Exam   .      ASSESSMENT/PLAN:    1. Primary hypertension  -     triamterene-hydroCHLOROthiazide (MAXZIDE-25) 37.5-25 MG per tablet; take 1 tablet by mouth once daily, Disp-90 tablet, R-1PATIENT DUE FOR APPOINTMENTNormal  -     lisinopril (PRINIVIL;ZESTRIL) 40 MG tablet; take 1 tablet by mouth once daily, Disp-90 tablet, R-1Normal  2. Bipolar 1 disorder, depressed ([facility]  3. Gastroesophageal reflux disease, unspecified whether esophagitis present  4. Class 1 obesity due to excess calories with serious comorbidity and body mass index (BMI) of 31.0 to 31.9 in adult  5. Primary insomnia  6. Post traumatic stress disorder  7. Acute bacterial sinusitis  -     fluticasone (FLONASE) 50 MCG/ACT nasal spray; 2 sprays by Nasal route daily, Disp-16 g, R-0Normal  -     azithromycin (ZITHROMAX) 250 MG tablet; 500 mg orally on day one followed by 250 mg daily on days two through five, Disp-6 tablet, R-0Normal  8. Preventative health care  -     Hepatitis C Antibody; Future  9. Colon cancer screening  -     Cologuard; Future      Low carb, low fat diet, increase fruits and vegetables, and exercise 4-5 times a week 30-40 minutes a day, or walk 1-2 hours per day, or wear a pedometer and get at least 10,000 steps per day. Dental exam 2-3 times /year advised. Immunizations reviewed. Health Maintenance reviewed   Smoking cessation counseling given     Annual eye exam advised. Lara received counseling on the following healthy behaviors: nutrition, exercise and medication adherence  Reviewed prior labs and health maintenance  Discussed use, benefit, and side effects of prescribed medications. Barriers to medication compliance addressed. Patient given educational materials - see patient instructions  All patient questions answered. Patient voiced understanding.     The patient's past medical, surgical, social, and family history as well as her  current medications and allergies were reviewed as documented in today's encounter. Medications, labs, diagnostic studies, consultations and [facility] Return in about 3 months (around 3/16/2022). Data Unavailable    Future Appointments   Date Time Provider Department Center   2021  1:45 PM Harriet Guillen MD [facility]   3/21/2022  2:30 PM Manas Kim MD [facility] [address]   3/30/2022 11:30 AM Harriet Guillen MD [facility]           Patient Active Problem List   Diagnosis    Seasonal allergies    HTN (hypertension)    Iron (Fe) deficiency anemia    Obesity    GERD (gastroesophageal reflux disease)    Bell's palsy    Nasal congestion    Raynaud's phenomenon without gangrene    History of low transverse  section    Bipolar 1 disorder, depressed ([facility] [address]    Post traumatic stress disorder    Primary insomnia    Excess skin of abdomen    Major depressive disorder, recurrent episode, mild (HCC)    Chronic pain of right knee    Right hip pain    Neck pain on right side    History of sinus surgery    Episodic overuse of medication    S/P bilateral breast reduction    Rectus diastasis    Symptomatic abdominal panniculus       Prior to Visit Medications    Medication Sig Taking?  Authorizing Provider   triamterene-hydroCHLOROthiazide (MAXZIDE-25) 37.5-25 MG per tablet take 1 tablet by mouth once daily Yes Manas Kim MD   lisinopril (PRINIVIL;ZESTRIL) 40 MG tablet take 1 tablet by mouth once daily Yes Manas Kim MD   fluticasone (FLONASE) 50 MCG/ACT nasal spray 2 sprays by Nasal route daily Yes Manas Kim MD   azithromycin (ZITHROMAX) 250 MG tablet 500 mg orally on day one followed by 250 mg daily on days two through five Yes Manas Kim MD   lidocaine (LMX) 4 % cream Apply topically BID as needed for pain Yes Anisa Zavaleta APRN - CNP   furosemide (LASIX) 20 MG tablet Take 20 mg by mouth 2 times daily Yes Historical Provider, MD   aspirin 81 MG EC tablet Take 81 mg by mouth daily Yes Historical Provider, MD   polyethyl glycol-propyl glycol 0.4-0.3 % (SYSTANE) 0.4-0.3 % ophthalmic solution Place 1 drop into both eyes as needed for Dry Eyes Yes Branius Littleton, DO   Adapalene-Benzoyl Peroxide 0.1-2.5 % GEL daily Yes Mary Simpsongle, DO   ARIPiprazole (ABILIFY) 20 MG tablet take 1 tablet by mouth every morning Yes Historical Provider, MD   busPIRone (BUSPAR) 15 MG tablet take 1 tablet by mouth twice a day Yes Historical Provider, MD   benztropine (COGENTIN) 2 MG tablet take 1 tablet by mouth twice a day if needed Yes Historical Provider, MD        Allergies   Allergen Reactions    Celebrex [Celecoxib] Shortness Of Breath and Swelling    Pcn [Penicillins]      c/o yeast infection     Trazodone And Nefazodone      Suicidal thoughts       Past Medical History:   Diagnosis Date    Bell's palsy     [facility]    GERD (gastroesophageal reflux disease)     HTN (hypertension)     Iron (Fe) deficiency anemia     Obesity     Post traumatic stress disorder     Post-nasal drip     Seasonal allergies        Past Surgical History:   Procedure Laterality Date    BREAST REDUCTION SURGERY       SECTION      Twice    LIPECTOMY N/A 10/19/2021    PANNICULECTOMY/ABDOMINOPLASTY, TAP BLOCK, INCISIONAL WOUND VAC, BIOPATCH performed by Beny Barba MD at [address]         .   Social History     Tobacco Use    Smoking status: Former Smoker     Quit date: 1995     Years since quittin.9    Smokeless tobacco: Never Used   Vaping Use    Vaping Use: Never used   Substance Use Topics    Alcohol use: Yes     Comment: occational wine    Drug use: No       Family History   Problem Relation Age of Onset    High Blood Pressure Mother     Coronary Art Dis Mother     Thyroid Disease Mother     Depression Mother     High Blood Pressure Father     Thyroid Disease Father     Depression Father        ADVANCE DIRECTIVE: N, <no information>    SUBJECTIVE / Reyes Rose is here for Annual Exam       Patient has history of hypertension controlled on current treatment is on multiple medications takes Lasix only as needed. Patient had blood work done recently after she had abdominal plasty done. History of bipolar disorder follows with psychiatrist is on multiple medications. Insomnia and posttraumatic stress disorder stable on current treatment. Patient has history of chronic sinusitis had surgery done in the past, reports she gets flareups currently she is complaining of postnasal drip sinus congestion was using inhaler needs refill. She denies any fever chills. GERD stable on current treatment. Obesity improving patient is on lifestyle changes. Urinary symptoms: no    Sexually active: Yes     last pap: was normal  The patient has NO history of abnormal PAP    Last mammogram:NORMAL, patient wants to hold on mammogram.  The patient has NO  family history of breast cancer    Wears seatbelts: yes     Regular exercise: yes  Ever been transfused or tattooed?: not asked  The patient reports that domestic violence in her life is absent. Last eye exam: up to date: Yes        Hearing Screening    125Hz 250Hz 500Hz 1000Hz 2000Hz 3000Hz 4000Hz 6000Hz 8000Hz   Right ear:            Left ear:               Visual Acuity Screening    Right eye Left eye Both eyes   Without correction: 20/30 20/25 20/25   With correction:          Hearing:normal :Yes    Last dental exam and preventative dental cleaning: up to date : Yes    Nutritional assessment: Body mass index is 31.74 kg/m². Elevated.      Weight is   Wt Readings from Last 3 Encounters:   12/16/21 168 lb (76.2 kg)   11/30/21 155 lb (70.3 kg)   11/10/21 155 lb (70.3 kg)       Healthful diet and Physical activity counseling to prevent CVD- low carb, low fat diet, increase fruits and vegetables, and exercise 4-5 times a day 30-40minutes a day discussed    Nicotine dependence. no  Smoker, counseling given to quit smoking. Counseling given: Not Answered      Alcohol use: no    Immunization History   Administered Date(s) Administered    COVID-19, Pfizer, LAWSON, 30mcg/0.3mL 08/27/2021, 09/19/2021       COVID-19 vaccine:  Yes     Tdap one time, then Td every 10 years-up to date:  Yes    Influenza annually-up to date:  No:  REFUSED     PPSV 23 in all adults 19-63 yo with chronic conditions,smokers, alcoholism,  nursing home residents; then PCV 13 at 71 yo-up to date: No:  or N/A    Menopause: No: EARLY    No LMP recorded. Patient is perimenopausal.     Colon cancer screening recommended at 39years old YES    The patient has NO  family history of colon cancer    Blood pressure is normal.  BP Readings from Last 3 Encounters:   12/16/21 130/80   10/23/21 (!) 133/100   10/19/21 123/84       Pulse is normal.  Pulse Readings from Last 3 Encounters:   12/16/21 76   11/30/21 90   10/27/21 90       A1c is   Lab Results   Component Value Date    LABA1C 5.1 04/12/2021       Lipid screening -    Lab Results   Component Value Date    CHOL 142 04/12/2021    CHOL 141 03/30/2016     Lab Results   Component Value Date    TRIG 43 04/12/2021    TRIG 44 03/30/2016     Lab Results   Component Value Date    HDL 57 04/12/2021    HDL 55 06/16/2017    HDL 58 03/30/2016     Lab Results   Component Value Date    LDLCHOLESTEROL 76 04/12/2021    LDLCHOLESTEROL 77 06/16/2017    LDLCHOLESTEROL 74 03/30/2016     Lab Results   Component Value Date    CHOLHDLRATIO 2.5 04/12/2021    CHOLHDLRATIO 2.6 06/16/2017    CHOLHDLRATIO 2.4 03/30/2016       Cardiovascular risk is:      The 10-year ASCVD risk score (Mar Velasquez, et al., 2013) is: 2.2%    Values used to calculate the score:      Age: 48 years      Sex: Female      Is Non- : Yes      Diabetic: No      Tobacco smoker: No Systolic Blood Pressure: 745 mmHg      Is BP treated: Yes      HDL Cholesterol: 57 mg/dL      Total Cholesterol: 142 mg/dL    Hepatitis C screening- =  No results found for: HAV, HEPAIGM, HEPBIGM, HEPBCAB, HBEAG, HEPCAB         []Negative depression screening. []1-4 = Minimal depression   []5-9 = Mild depression   []10-14 = Moderate depression   []15-19 = Moderately severe depression   []20-27 = Severe depression    PHQ Scores 12/16/2021 10/7/2021 6/8/2021 4/20/2021 7/16/2020 4/23/2018 6/16/2017   PHQ2 Score 0 2 2 0 0 2 0   PHQ9 Score 0 4 2 0 0 2 0       Health Maintenance   Topic Date Due    Hepatitis C screen  Never done    Colon cancer screen colonoscopy  Never done    Flu vaccine (1) Never done    DTaP/Tdap/Td vaccine (1 - Tdap) 06/08/2022 (Originally 5/21/1992)    COVID-19 Vaccine (3 - Booster for Pfizer series) 03/19/2022    Potassium monitoring  10/23/2022    Creatinine monitoring  10/23/2022    Diabetes screen  04/12/2024    Cervical cancer screen  12/03/2024    Lipid screen  04/12/2026    HIV screen  Completed    Hepatitis A vaccine  Aged Out    Hepatitis B vaccine  Aged Out    Hib vaccine  Aged Out    Meningococcal (ACWY) vaccine  Aged Out    Pneumococcal 0-64 years Vaccine  Aged Out       -rest of complaints with corresponding details per ROS    Review of Systems   Constitutional: Negative for activity change, appetite change, diaphoresis, fever and unexpected weight change. HENT: Positive for congestion and postnasal drip. Negative for ear discharge, hearing loss, mouth sores, nosebleeds, rhinorrhea, sinus pressure, sinus pain, sneezing and sore throat. Eyes: Negative for photophobia and visual disturbance. Respiratory: Negative for cough, chest tightness, shortness of breath and wheezing. Cardiovascular: Negative for chest pain, palpitations and leg swelling. Gastrointestinal: Negative for abdominal distention, anal bleeding, blood in stool, diarrhea and nausea. Endocrine: Negative for polyuria. Genitourinary: Negative for difficulty urinating, flank pain, frequency, menstrual problem, urgency and vaginal pain. Musculoskeletal: Negative for arthralgias, back pain, myalgias, neck pain and neck stiffness. Skin: Negative for color change and wound. Neurological: Negative for dizziness, speech difficulty, weakness, light-headedness and numbness. Psychiatric/Behavioral: Negative for agitation, decreased concentration, dysphoric mood, hallucinations, self-injury and sleep disturbance. The patient is nervous/anxious.          -vital signs stable and within normal limits except     /80   Pulse 76   Temp 97.8 °F (36.6 °C)   Ht 5' 1\" (1.549 m)   Wt 168 lb (76.2 kg)   SpO2 98%   BMI 31.74 kg/m²   Vitals:    12/16/21 0835   BP: 130/80   Pulse: 76   Temp: 97.8 °F (36.6 °C)   SpO2: 98%   Weight: 168 lb (76.2 kg)   Height: 5' 1\" (1.549 m)     Estimated body mass index is 31.74 kg/m² as calculated from the following:    Height as of this encounter: 5' 1\" (1.549 m). Weight as of this encounter: 168 lb (76.2 kg). Physical Exam  Vitals and nursing note reviewed. Constitutional:       Appearance: Normal appearance. HENT:      Head: Normocephalic and atraumatic. Nose: Congestion present. No rhinorrhea. Right Turbinates: Not enlarged. Left Turbinates: Not enlarged. Mouth/Throat:      Mouth: Mucous membranes are moist.   Eyes:      Extraocular Movements: Extraocular movements intact. Pupils: Pupils are equal, round, and reactive to light. Cardiovascular:      Rate and Rhythm: Normal rate and regular rhythm. No extrasystoles are present. Pulses: No decreased pulses. Heart sounds: Normal heart sounds. Pulmonary:      Effort: Pulmonary effort is normal.      Breath sounds: Normal breath sounds. No decreased breath sounds, wheezing or rhonchi. Abdominal:      General: Bowel sounds are normal. There is no distension. Palpations: Abdomen is soft. There is no mass. Tenderness: There is no abdominal tenderness. There is no guarding or rebound. Musculoskeletal:      Cervical back: Normal range of motion. Thoracic back: No spasms. Normal range of motion. Lumbar back: No spasms. Normal range of motion. Right lower leg: No edema. Left lower leg: No edema. Lymphadenopathy:      Cervical: No cervical adenopathy. Neurological:      Mental Status: She is alert and oriented to person, place, and time. Cranial Nerves: Cranial nerves are intact. No cranial nerve deficit. Motor: No weakness or tremor. Coordination: Romberg sign negative. Gait: Gait normal.   Psychiatric:         Attention and Perception: Attention and perception normal.         Mood and Affect: Mood is not anxious or depressed. Speech: She is communicative. Speech is not rapid and pressured. Behavior: Behavior normal. Behavior is not agitated or slowed. No flowsheet data found. I personally reviewed testing with patient.       Otherwise labs within normal limits      Lab Results   Component Value Date    WBC 7.5 10/23/2021    HGB 11.0 (L) 10/23/2021    HCT 33.6 (L) 10/23/2021    MCV 99.1 10/23/2021     10/23/2021       Lab Results   Component Value Date     10/23/2021    K 4.2 10/23/2021     10/23/2021    CO2 25 10/23/2021    BUN 13 10/23/2021    CREATININE 0.55 10/23/2021    GLUCOSE 102 10/23/2021    CALCIUM 8.8 10/23/2021        Lab Results   Component Value Date    ALT 16 10/23/2021    AST 21 10/23/2021    ALKPHOS 47 10/23/2021    BILITOT 0.25 (L) 10/23/2021       Lab Results   Component Value Date    TSH 0.77 04/12/2021       Lab Results   Component Value Date    LDLCHOLESTEROL 76 04/12/2021       Lab Results   Component Value Date    LABA1C 5.1 04/12/2021       No results found for: QSKUREAP76    No results found for: FOLATE    No results found for: IRON, TIBC, FERRITIN    No results found for: VITD25      Orders Placed This Encounter   Medications    triamterene-hydroCHLOROthiazide (MAXZIDE-25) 37.5-25 MG per tablet     Sig: take 1 tablet by mouth once daily     Dispense:  90 tablet     Refill:  1     PATIENT DUE FOR APPOINTMENT    lisinopril (PRINIVIL;ZESTRIL) 40 MG tablet     Sig: take 1 tablet by mouth once daily     Dispense:  90 tablet     Refill:  1    fluticasone (FLONASE) 50 MCG/ACT nasal spray     Si sprays by Nasal route daily     Dispense:  16 g     Refill:  0    azithromycin (ZITHROMAX) 250 MG tablet     Si mg orally on day one followed by 250 mg daily on days two through five     Dispense:  6 tablet     Refill:  0         Medications Discontinued During This Encounter   Medication Reason    baclofen (LIORESAL) 10 MG tablet Therapy completed    budesonide (RINOCORT AQUA) 32 MCG/ACT nasal spray Therapy completed    gabapentin (NEURONTIN) 300 MG capsule Therapy completed    Pseudoephedrine HCl (SUDAFED 24 HOUR PO) Therapy completed    triamterene-hydroCHLOROthiazide (MAXZIDE-25) 37.5-25 MG per tablet REORDER    lisinopril (PRINIVIL;ZESTRIL) 40 MG tablet REORDER         Orders Placed This Encounter   Procedures    Cologuard     This test is performed by an external laboratory and is used for result attachment only. It is required that this order requisition be faxed to: [facility] @@ [phone]. See [email or web address] for further information. Standing Status:   Future     Standing Expiration Date:   2022    Hepatitis C Antibody     Standing Status:   Future     Standing Expiration Date:   2022         This note was completed by using the assistance of a speech-recognition program. However, inadvertent computerized transcription errors may be present. Although every effort was made to ensure accuracy, no guarantees can be provided that every mistake has been identified and corrected by editing.       An electronic signature was used to authenticate this note.     Electronically signed by Johanna Cifuentes MD on 12/16/2021 at 9:31 AM

## 2021-12-23 ENCOUNTER — OFFICE VISIT (OUTPATIENT)
Dept: SURGERY | Age: 46
End: 2021-12-23

## 2021-12-23 VITALS
HEIGHT: 61 IN | RESPIRATION RATE: 12 BRPM | WEIGHT: 168 LBS | OXYGEN SATURATION: 100 % | BODY MASS INDEX: 31.72 KG/M2 | HEART RATE: 90 BPM

## 2021-12-23 DIAGNOSIS — L90.5 SCAR ADHERENT: Primary | ICD-10-CM

## 2021-12-23 PROCEDURE — 99024 POSTOP FOLLOW-UP VISIT: CPT | Performed by: PLASTIC SURGERY

## 2021-12-23 NOTE — PROGRESS NOTES
[facility]  [address] [place] [address],[address]  [address] [facility] [address]       OFFICE POST-OP NOTE    Patient Name:  Rigo Galindo    :  1975    MRN:  [id number]  STATUS POST  Chief Complaint   Patient presents with    Panniculectomy     upcoming scar revision sx       SUBJECTIVE  Patient seen and examined. Patient status post panniculectomy abdominoplasty on 10/19/21      PHYSICAL EXAM  Vital Signs:  Pulse 90   Resp 12   Ht 5' 1\" (1.549 m)   Wt 168 lb (76.2 kg)   SpO2 100%   BMI 31.74 kg/m²     Incisions:  Suture line clean dry and intact. Umbilicus appears to be viable. It has a large dogear on the left. There is some asymmetry of the scar. There is discomfort over the dogears. Skin:  No evidence of infection. Neurologic:  Alert & oriented x 3. ASSESSMENT   Diagnosis Orders   1. Scar adherent         PLAN  We discussed liposuction of the dogear. I discussed with the patient that liposuction may not be covered. We also discussed scar revision. Discussed with the patient we would like to give her 3 months for things to settle down. So discussed that the patient may need additional revisions. Patient demonstrated understanding. We also discussed possible liposuction. Plan discussed with patient.     Electronically signed by:  Devan Tapia M.D.   2021

## 2022-01-20 ENCOUNTER — TELEPHONE (OUTPATIENT)
Dept: FAMILY MEDICINE CLINIC | Age: 47
End: 2022-01-20

## 2022-02-04 ENCOUNTER — TELEPHONE (OUTPATIENT)
Dept: SURGERY | Age: 47
End: 2022-02-04

## 2022-02-07 NOTE — PROGRESS NOTES
Preoperative Instructions:    Stop eating solid foods at midnight the night prior to your surgery. Stop drinking clear liquids at midnight the night prior to your surgery. Arrive at the surgery center (3rd entrance) on __________2/17_____ by ____930am___________. Please stop any blood thinning medications as directed by your surgeon or prescribing physician. Failure to stop certain medications may interfere with your scheduled surgery. These may include: Aspirin, Coumadin, Plavix, NSAIDS (Motrin, Aleve, Advil, Mobic, Celebrex), Eliquis, Pradaxa, Xarelto, Fish oil, and herbal supplements. You may continue the rest of your medications through the night before surgery unless instructed otherwise. Take a shower or bath on the morning of your surgery/procedure (hibiclens if directed)      Reminders:  -If you are going home the day of your procedure, you will need a family member or friend to stay during the procedure and drive you home after your procedure. Your  must be 25years of age or older and able to sign off on your discharge instructions.    -If you are going home the same day of your surgery, someone must remain with you for the first 24 hours after your surgery if you receive sedation or anesthesia.      -Please do not wear any jewelery or body piercing the day of surgery

## 2022-02-09 ENCOUNTER — OFFICE VISIT (OUTPATIENT)
Dept: SURGERY | Age: 47
End: 2022-02-09
Payer: COMMERCIAL

## 2022-02-09 ENCOUNTER — HOSPITAL ENCOUNTER (OUTPATIENT)
Age: 47
Setting detail: SPECIMEN
Discharge: HOME | End: 2022-02-09

## 2022-02-09 VITALS — WEIGHT: 157 LBS | HEIGHT: 61 IN | BODY MASS INDEX: 29.64 KG/M2 | RESPIRATION RATE: 12 BRPM

## 2022-02-09 DIAGNOSIS — Z01.818 PRE-OP TESTING: ICD-10-CM

## 2022-02-09 DIAGNOSIS — E88.1 LIPODYSTROPHY: ICD-10-CM

## 2022-02-09 DIAGNOSIS — L90.5 SCARS: Primary | ICD-10-CM

## 2022-02-09 LAB
ANION GAP SERPL CALCULATED.3IONS-SCNC: 14 MMOL/L (ref 9–17)
BUN BLDV-MCNC: 15 MG/DL (ref 6–20)
BUN/CREAT BLD: NORMAL (ref 9–20)
CALCIUM SERPL-MCNC: 10 MG/DL (ref 8.6–10.4)
CHLORIDE BLD-SCNC: 99 MMOL/L (ref 98–107)
CO2: 22 MMOL/L (ref 20–31)
CREAT SERPL-MCNC: 0.65 MG/DL (ref 0.5–0.9)
GFR AFRICAN AMERICAN: >60 ML/MIN
GFR NON-AFRICAN AMERICAN: >60 ML/MIN
GFR SERPL CREATININE-BSD FRML MDRD: NORMAL ML/MIN/{1.73_M2}
GFR SERPL CREATININE-BSD FRML MDRD: NORMAL ML/MIN/{1.73_M2}
GLUCOSE BLD-MCNC: 84 MG/DL (ref 70–99)
HCT VFR BLD CALC: 45.9 % (ref 36.3–47.1)
HEMOGLOBIN: 14.7 G/DL (ref 11.9–15.1)
MCH RBC QN AUTO: 30.8 PG (ref 25.2–33.5)
MCHC RBC AUTO-ENTMCNC: 32 G/DL (ref 28.4–34.8)
MCV RBC AUTO: 96.2 FL (ref 82.6–102.9)
NRBC AUTOMATED: 0 PER 100 WBC
PDW BLD-RTO: 14.6 % (ref 11.8–14.4)
PLATELET # BLD: 328 K/UL (ref 138–453)
PMV BLD AUTO: 10.6 FL (ref 8.1–13.5)
POTASSIUM SERPL-SCNC: 3.8 MMOL/L (ref 3.7–5.3)
RBC # BLD: 4.77 M/UL (ref 3.95–5.11)
SODIUM BLD-SCNC: 135 MMOL/L (ref 135–144)
WBC # BLD: 5.2 K/UL (ref 3.5–11.3)

## 2022-02-09 PROCEDURE — G8428 CUR MEDS NOT DOCUMENT: HCPCS | Performed by: PLASTIC SURGERY

## 2022-02-09 PROCEDURE — G8417 CALC BMI ABV UP PARAM F/U: HCPCS | Performed by: PLASTIC SURGERY

## 2022-02-09 PROCEDURE — 99213 OFFICE O/P EST LOW 20 MIN: CPT | Performed by: PLASTIC SURGERY

## 2022-02-09 PROCEDURE — G8484 FLU IMMUNIZE NO ADMIN: HCPCS | Performed by: PLASTIC SURGERY

## 2022-02-09 PROCEDURE — 1036F TOBACCO NON-USER: CPT | Performed by: PLASTIC SURGERY

## 2022-02-11 NOTE — PROGRESS NOTES
Jordan Narvaez DR [facility]GERY  [address]  [phone]       History and Physical     Chief Complaint   Patient presents with    Pre-op Exam     Liposuction with scar revision back/flank        HPI:   Lisa Olivares is a 48 y.o. female who presents status post panniculectomy and abdominoplasty with dogears that cause irritation. Patient also has lipodystrophy of the mons pubis. She would like that addressed as well. Patient is here to discuss her surgical options regarding the dogears as well as the excess tissue on the mons and the lateral flanks    Medications:     Current Outpatient Medications   Medication Sig Dispense Refill    triamterene-hydroCHLOROthiazide (MAXZIDE-25) 37.5-25 MG per tablet take 1 tablet by mouth once daily 90 tablet 1    lisinopril (PRINIVIL;ZESTRIL) 40 MG tablet take 1 tablet by mouth once daily 90 tablet 1    fluticasone (FLONASE) 50 MCG/ACT nasal spray 2 sprays by Nasal route daily 16 g 0    lidocaine (LMX) 4 % cream Apply topically BID as needed for pain 1 each 2    furosemide (LASIX) 20 MG tablet Take 20 mg by mouth 2 times daily      aspirin 81 MG EC tablet Take 81 mg by mouth daily      Adapalene-Benzoyl Peroxide 0.1-2.5 % GEL daily 45 g 2    ARIPiprazole (ABILIFY) 20 MG tablet take 1 tablet by mouth every morning  0    busPIRone (BUSPAR) 15 MG tablet take 1 tablet by mouth twice a day  0    benztropine (COGENTIN) 2 MG tablet take 1 tablet by mouth twice a day if needed  0     No current facility-administered medications for this visit. Allergies: Allergies   Allergen Reactions    Celebrex [Celecoxib] Shortness Of Breath and Swelling    Pcn [Penicillins]      c/o yeast infection     Trazodone And Nefazodone      Suicidal thoughts     Review of Systems:   Constitutional: Negative for fever, chills, fatigue and unexpected weight change. HENT: Negative for hearing loss, sore throat and facial swelling. Eyes: Negative for pain and discharge. Respiratory: Negative for cough and shortness of breath. Patient has seasonal allergies. Cardiovascular: Patient has a history of hypertension. .   Gastrointestinal: Patient has history of GERD. Skin: Negative for pallor and rash. Neurological: Patient has a history of Bell's palsy. Hematological: Patient has iron deficiency anemia. Psychiatric/Behavioral: Negative for behavioral problems. Patient has bipolar disorder. Patient has posttraumatic stress syndrome.     Past Medical History:   Diagnosis Date    Bell's palsy     [facility]    GERD (gastroesophageal reflux disease)     HTN (hypertension)     Iron (Fe) deficiency anemia     Obesity     Post traumatic stress disorder     Post-nasal drip     Seasonal allergies      Past Surgical History:   Procedure Laterality Date    BREAST REDUCTION SURGERY       SECTION      Twice    LIPECTOMY N/A 10/19/2021    PANNICULECTOMY/ABDOMINOPLASTY, TAP BLOCK, INCISIONAL WOUND VAC, BIOPATCH performed by Garry Kirk MD at [place] [address] History     Socioeconomic History    Marital status:      Spouse name: Not on file    Number of children: Not on file    Years of education: Not on file    Highest education level: Not on file   Occupational History    Not on file   Tobacco Use    Smoking status: Former Smoker     Quit date: 1995     Years since quittin.1    Smokeless tobacco: Never Used   Vaping Use    Vaping Use: Never used   Substance and Sexual Activity    Alcohol use: Yes     Comment: occational wine    Drug use: No    Sexual activity: Yes     Partners: Male     Birth control/protection: Surgical     Comment: TL    Other Topics Concern    Not on file   Social History Narrative    Not on file     Social Determinants of Health     Financial Resource Strain: Low Risk     Difficulty of Paying Living Expenses: Not hard at all   Food Insecurity: No Food Insecurity    Worried About [address] in the Last Year: Never true    Luis Fernando of Food in the Last Year: Never true   Transportation Needs: No Transportation Needs    Lack of Transportation (Medical): No    Lack of Transportation (Non-Medical): No   Physical Activity:     Days of Exercise per Week: Not on file    Minutes of Exercise per Session: Not on file   Stress:     Feeling of Stress : Not on file   Social Connections:     Frequency of Communication with Friends and Family: Not on file    Frequency of Social Gatherings with Friends and Family: Not on file    Attends [employer] Services: Not on file    Active Member of [address] [place] [address] or Organizations: Not on file    Attends Club or Organization Meetings: Not on file    Marital Status: Not on file   Intimate Partner Violence:     Fear of Current or Ex-Partner: Not on file    Emotionally Abused: Not on file    Physically Abused: Not on file    Sexually Abused: Not on file   Housing Stability: Unknown    Unable to Pay for Housing in the Last Year: No    Number of [place] in the Last Year: Not on file    Unstable Housing in the Last Year: No     Family History   Problem Relation Age of Onset    High Blood Pressure Mother     Coronary Art Dis Mother     Thyroid Disease Mother     Depression Mother     High Blood Pressure Father     Thyroid Disease Father     Depression Father      Physical Exam:   Resp 12   Ht 5' 1\" (1.549 m)   Wt 157 lb (71.2 kg)   LMP  (LMP Unknown)   BMI 29.66 kg/m²    Body mass index is 29.66 kg/m². Physical Exam   Nursing note and vitals reviewed. Constitutional: Oriented to person, place, and time. Appears well-developed and well-nourished. No distress. Head: Normocephalic and atraumatic. Eyes: Conjunctivae and EOM are normal.   Pulmonary/Chest: Effort normal. No respiratory distress. Neurological: Alert and oriented to person, place, and time.    Skin: Patient has dogears more on the left than right. Patient has lipodystrophy of the mons pubis with ptosis of the mons pubis. Also has scar asymmetry  Psychiatric: Normal mood and affect. Behavior is normal    Imaging:   @[address]@        Impression/Plan:      Diagnosis Orders   1. Scars     2. Lipodystrophy       Patient Active Problem List   Diagnosis    Seasonal allergies    HTN (hypertension)    Iron (Fe) deficiency anemia    Obesity    GERD (gastroesophageal reflux disease)    Bell's palsy    Nasal congestion    Raynaud's phenomenon without gangrene    History of low transverse  section    Bipolar 1 disorder, depressed ([address]    Post traumatic stress disorder    Primary insomnia    Excess skin of abdomen    Major depressive disorder, recurrent episode, mild (HCC)    Chronic pain of right knee    Right hip pain    Neck pain on right side    History of sinus surgery    Episodic overuse of medication    S/P bilateral breast reduction    Rectus diastasis    Symptomatic abdominal panniculus     Plan:  We discussed scar revision with excision and possible liposuction of the dogears. We discussed months left. We discussed that the scars may continue to be symmetrical as discussed with the patient previously the body is not 100% symmetrical and it is likely not possible possible to obtain 100% symmetry. We discussed infection bleeding wound healing issues. We discussed that the scars will probably be more circumferential at this point if we have to continue chasing the dogears. We discussed the different ways that are involved she has multiple moles need to weight loss. We also discussed need for more revisions. Changes were made or implied. ASPS consent mons pubis lift    GENERAL INFORMATION  Mons pubis lift is a surgical procedure to remove excess skin and fatty tissue from the mons pubis.   Obese individuals who intend to lose weight should postpone all forms of body contouring surgery until they have reached a stable weight. ALTERNATIVE TREATMENTS  Alternative forms of management consist of not treating the areas of loose skin and fatty deposits. Liposuction may be a surgical alternative to if there is good skin tone and localized mons pubis fatty tissue. If you have lost your skin laxity as apparent by multiple stretch marks you will not be a good liposuction candidate. Diet and exercise programs may be of benefit in the overall reduction of excess body fat and contour improvement. Risks and potential complications are associated with alternative surgical forms of treatment. RISKS OF mons pubis lift SURGERY  Every surgical procedure involves a certain amount of risk and it is important that you understand these risks and the possible complications associated with them. In addition, every procedure has limitations. An individual's choice to undergo a surgical procedure is based on the comparison of the risk to potential benefit. Although the majority of patients do not experience these complications, you should discuss each of them with your plastic surgeon to make sure you completely understand all possible consequences of an abdominoplasty/panniculectomy. Bleeding- It is possible, though unusual, to experience a bleeding episode during or after surgery. Should post-operative bleeding occur, it may require an emergency treatment to drain the accumulated blood or blood transfusion. Intra-operative blood transfusions may be required. Do not take any aspirin or anti-inflammatory medications for ten days before surgery, as this may increase the risk of bleeding. Non-prescription herbs and dietary supplements can increase the risk of surgical bleeding. Hematoma can occur at any time following injury. If blood transfusions are needed to treat blood loss, there is a risk of blood related infections such as hepatitis and the HIV (AIDS).   Heparin medications that are used to prevent blood clots in veins can produce bleeding and decreased blood platelets. Infection - Infection is can occur after this surgery. Should an infection occur, treatment including antibiotics, hospitalization, or additional surgery may be necessary. There is a greater risk of infection when body contouring procedures are performed in conjunction with abdominal surgical procedures. Change in Skin Sensation- It is common to experience diminished (or loss) of skin sensation in areas that have had surgery. Diminished (or complete loss of skin sensation) may not totally resolve after an abdominoplasty/pannicular     Skin Contour Irregularities- Contour and shape irregularities and depressions may occur after mons pubis lift. Visible and palpable wrinkling of skin can occur. Residual skin irregularities at the ends of the incisions or dog ears are always a possibility as is skin pleating when there is excessive redundant skin. This may improve with time, or it can be surgically corrected. Major Wound Separation- Wounds may separate after surgery. Should this occur, additional treatment including surgery may be necessary. Skin Discoloration / Swelling- Bruising and swelling normally occurs following mons lift. The skin in or near the surgical site can appear either lighter or darker than surrounding skin. Although uncommon, swelling and skin discoloration may persist for long periods of time and, in rare situations, may be permanent. Skin Sensitivity- Itching, tenderness, or exaggerated responses to hot or cold temperatures may occur after surgery. Usually this resolve during healing, but in some situations it may be chronic. Sutures- Most surgical techniques use deep sutures. You may notice these sutures after your surgery.   Sutures may spontaneously poke through the skin, become visible or produce irritation that requires removal.    Damage to Deeper Structures- There is the potential for injury to deeper structures including nerves, blood vessels, muscles, and lungs (pneumothorax), or intra-abdominal injury can occure during any surgical procedure. The potential for this to occur varies according to the type of procedure being performed. Injury to deeper structures may be temporary or permanent. Fat Necrosis- Fatty tissue found deep in the skin might die. This may produce areas of firmness within the skin. Additional surgery to remove areas of fat necrosis may be necessary. There is the possibility of contour irregularities in the skin that may result from fat necrosis. Obesity: If you're BMI is greater than 30 you may have a higher chance of complications. This may include but not limited to wound healing and infections. Also, if you have other medical problems such as diabetes and hypertension it may affect your healing as well as your surgical results in bleeding. Pubic Distortion- There will be distortion of their labia and pubic area. Additional treatment including surgery may be necessary. Even with additional surgery she may never have symmetry. At times you may have painful intercourse. Scarring- All surgery leaves scars, some more visible than others. This surgery will leave large scars. Abnormal scars may occur within the skin and deeper tissues depending on your healing. Scars may be unattractive and of different color than surrounding skin. Scar appearance may also vary within the same scar, exhibit contour variations or \"bunching\" due to the amount of excess skin. Scars may be asymmetrical (appear different between right and left side of the body). There is the possibility of visible marks in the skin from sutures. In some cases, scars may require surgical revision or treatment. Surgical Anesthesia- Both local and general anesthesia involve risk.   There is the possibility of complications, injury, and even death from all forms of surgical anesthesia or sedation    Asymmetry- Symmetrical body appearance may not result from mons pubis lift factors such as skin tone, fatty deposits, skeletal prominence, and muscle tone may contribute to normal asymmetry in body features. Additional surgery may be necessary to attempt to attempt to improve asymmetry. Allergic Reactions- In some cases, local allergies to tape, suture material and glues, blood products, topical preparations or injected agents have been reported. Serious systemic reactions including shock (anaphylaxis) may occur to drugs used during surgery and prescription medications. Allergic reactions may require additional treatment. Delayed Healing- Wound disruption or delayed wound healing is possible. Some areas of the mons pubis may not heal normally and may take a long time to heal.  Some areas of skin or tissue may die. This may require frequent dressing changes or further surgery to remove the non-healed tissue. Smokers have a greater risk of skin loss and wound healing complications. Also, patient with certain systemic disease or taking certain medications are at increased risk. Also, infections under the pannus may affect your healing. Seroma- Fluid accumulations infrequently occur in between the skin and the mons pubis. This may require additional procedures for drainage of fluid. Shock- In rare circumstances, your surgical procedure can cause severe trauma, particularly when multiple or extensive procedures are performed. Although serious complications are infrequent, infections or excessive fluid loss can lead to severe illness and even death. If surgical shock occurs, hospitalization and additional treatment would be necessary.     Surgical Wetting Solutions-There is the possibility that large volumes of fluid containing dilute local anesthetic drugs and epinephrine that is injected into fatty deposits during surgery may contribute to fluid overload or systemic reaction to these medications. Additional treatment including hospitalization may be necessary. Persistent Swelling (Lymphedema)- Persistent swelling in the legs can occur following mons pubis lift    Pain- You will experience pain after your surgery. Pain of varying intensity and duration may occur and persist after panniculectomy. Chronic pain may occur every from nerves becoming trapped in scar tissue after panniculectomy. There may be numbness that can occur after a panniculectomy this could be temporary or permanent    Unsatisfactory Result- There is no guarantee or warranty expressed or implied, on the results that may be obtained. You may be disappointed with the results of panniculectomy surgery. This would include risks such as asymmetry, unsatisfactory or highly visible surgical scar location, unacceptable visible deformities, bunching and rippling in the skin near the suture lines or at the ends of the incisions (dog ears), poor healing, wound disruption, and loss of sensation. It may not be possible to correct or improve the effects of surgical scars. In some situations, it may not be possible to achieve optimal results with a single surgical procedure. Additional surgery may be required to improve results. Deep Venous Thrombosis, Cardiac and Pulmonary Complications- Surgery, especially longer procedures, may be associated with the formation of, or increase in, blood clots in the venous system. Pulmonary complications may occur secondarily to both blood clots (pulmonary emboli), fat deposits (fat emboli) or partial collapse of the lungs after general anesthesia. Pulmonary and fat emboli can be life-threatening or fatal in some circumstances. Air travel, inactivity and other conditions may increase the incidence of blood clots traveling to the lungs causing a major blood clot that may result in death.   It is important to discuss with your physician any past history of blood clots, swollen legs or the use of estrogen or birth control pills that may contribute to this condition. Cardiac complications are a risk with any surgery and anesthesia, even in patients without symptoms. Should any of these complications occur, you may require hospitalization and additional treatment. If you experience shortness of breath, chest pains, or unusual heart beats, seek medical attention immediately. ADDITIONAL ADVISORIES    Long-Term Results- Subsequent alterations in the appearance of your body may occur as the result of aging, sun exposure, weight loss, weight gain, pregnancy, menopause or other circumstances not related to your surgery. Metabolic Status of Massive Weight Loss Patients- Your personal metabolic status of blood chemistry and protein levels may be abnormal following massive weight loss and surgical procedures to make a patient lose weight. Individuals with abnormalities may be a risk for serious medical and surgical complications, including delayed wound healing, infection or even in rare cases, death. Body-Piercing Procedures- Individuals who currently wear body-piercing jewelry or are seeking to undergo body-piercing procedures must consider the possibility that an infection could develop anytime following this procedure. Treatment including antibiotics, hospitalization or additional surgery may be necessary. Intimate Relations After Surgery- Surgery involves coagulating of blood vessels and increased activity of any kind may open these vessels leading to a bleed, or hematoma. Increased activity that increased your pulse or heart rate may cause additional bruising, swelling, and the need for return to surgery and control bleeding. It is wise to refrain from sexual activity until your physician states it is safe. Medications- There are many adverse reactions that occur as the result of taking over the counter, herbal, and/or prescription medications.   Be sure to check with your physician about any drug interactions that may exist with medications that you are already taking. If you have an adverse reaction, stop the drugs immediately and call your plastic surgeon for further instructions. If the reaction is severe, go immediately to the nearest emergency room. When taking the prescribed pain medications after surgery, realize that they can affect your thought process and coordination. Do not drive, do not operate complex equipment, do not make any important decisions and do not drink any alcohol while taking these medications. Be sure to take your prescribed medication only as directed. If at blood thinners such as aspirin and Coumadin or Plavix etc. Is prescribed by a physician you must consult with the prescribing physician prior to stopping any of the blood thinners. Our focus is improvement rather than perfection. Complications or less than satisfactory results are sometimes unavoidable, may require additional surgery and often are stressful. Smoking, Second-Hand Smoke Exposure, Nicotine Products (Patch, Gum, Nasal Spray)-   Patients who are currently smoking, use tobacco products, or nicotine products (patch, gum, or nasal spray) are at a greater risk for significant surgical complications of skin dying, delayed healing, and additional scarring. Individuals exposed to second-hand smoke are also at potential risk for similar complications attributable to nicotine exposure. Additionally, smokers may have a significant negative effect on anesthesia and recovery from anesthesia, with coughing and possibly increased bleeding. Individuals who are not exposed to tobacco smoke or nicotine-containing products have a significantly lower risk of this type of complication. It is important to refrain from smoking at least 6-8 weeks before surgery and I do not smoke for 8 weeks to 3 months after surgery. Post-bariatric patients:  It is imperative that quit tests, and possible hospital charges, depending on where the surgery is performed. Depending on whether the cost of surgery is covered by an insurance plan, you will be responsible for necessary co-payments, deductibles, and charges not covered. The fees charged for this procedure do not include any potential future costs for additional procedures that you elect to have or require in order to revise, optimize, or complete your outcome. Additional costs may occur should complications develop from the surgery. Secondary surgery or hospital day-surgery charges involved with revision surgery will also be your responsibility. HEALTH INSURANCE  Most health insurance companies exclude coverage for cosmetic surgical operations any complications that might occur from surgery. Please carefully review your health insurance subscriber-information pamphlet or contact your insurance company for a detailed explanation of their policies for covering abdominoplasty/panniculectomy procedures. Most insurance plans may exclude coverage for secondary or revisionary surgery. The following information was discussed with the patient, but this is not an all-inclusive-other issue were also discussed with patient. GENERAL INFORMATION  The surgical treatment of scars is a procedure frequently performed by plastic surgeons. Scars are the unavoidable result of injuries, disease, or surgery. It is impossible to totally remove the presence of a scar, yet plastic surgery may improve the appearance and texture of scars. There are many different techniques of scar revision surgery. Other treatments including physical or hand therapy may be needed in addition to surgery. ALTERNATIVE TREATMENTS  Alternative forms of treatment consist of not treating the scar condition, injections of cortisone type drugs into the scar, or the use of special compressive garments/devices worn over the scar.   Dermabrasion, laser treatments and other surgical techniques may be used to revise scars. Risks and potential complications are associated with alternative forms of treatment. RISKS OF SCAR REVISION SURGERY  Every surgical procedure involves a certain amount of risk and it is important that you understand these risks and the possible complications associated with them. In addition, every procedure has limitations. An individual's choice to undergo a surgical procedure is based on the comparison of the risk to potential benefit. Although some of the patients do not experience these complications, you should an understanding of these possible complications. Bleeding- It is possible, to experience a bleeding episode during or after surgery. Intraoperative blood transfusions may be required. Should post-operative bleeding occur, it may require an emergency treatment to drain the accumulated blood or blood transfusion. Do not take any aspirin or anti-inflammatory medications for ten days before or after surgery, as this may increase the risk of bleeding. Non-prescription herbs and dietary supplements can increase the risk of surgical bleeding. Hematoma can occur at any time following injury. If blood transfusions are necessary to treat blood loss, there is the risk of blood-related infections such as hepatitis and HIV (AIDS). Heparin medications that are used to prevent blood clots in veins can produce bleeding and decreased blood platelets. Infection- Infection is unusual after surgery. Should an infection occur, additional treatment including antibiotics, hospitalization, or additional surgery may be necessary. Scarring- All surgery leaves scars, some more visible than others. Although good wound healing after a surgical procedure is expected, abnormal scars may occur within the skin and deeper tissues. Scars may be unattractive and of different color than the surrounding skin tone.   Scar appearance may also vary within the same scar.  Scars may be asymmetrical (appear different on the right and left side of the body). There is the possibility of visible marks in the skin from sutures. In some cases, scars may require surgical revision or treatment. Skin Discoloration / Swelling- Some bruising and swelling normally occurs following surgery. The skin in or near the surgical site can appear either lighter or darker than surrounding skin. Although uncommon, swelling and skin discoloration may persist for long periods of time and, in rare situations, may be permanent. Skin Sensitivity- Itching, tenderness, or exaggerated responses to hot or cold temperatures may occur after surgery. Usually this resolves during healing, but in some situations,  it may be chronic or permanent. Pain- You will experience pain after your surgery. Pain of varying intensity and duration may occur and persist after surgery. Chronic pain may occur from nerves becoming trapped in scar tissue. Damage to Deeper Structures- There is the potential for injury to deeper structures including nerves, blood vessels, muscles, and lungs (pneumothorax) during any surgical procedure. The potential for this to occur varies according to where on the body surgery is being performed. Injury to deeper structures may be temporary or permanent. Obesity: If you're BMI is greater than 30 you may have a higher chance of complications. This may include but not limited to wound healing and infections. Also, if you have other medical problems such as diabetes and hypertension it may affect your healing as well as your surgical results in bleeding. Wound Disruption- Until wound healing is complete, it is possible to split open the surgical wound where the scar revision was performed. Wound disruption can produce a poor surgical result. If this occurs, additional treatment may be necessary. Sutures- Some surgical techniques use deep sutures.   You may notice these sutures after your surgery. Sutures may spontaneously poke through the skin, become visible or produce irritation that requires removal.    Skin Contour Irregularities- Contour irregularities and depressions may occur after scar revision surgery. Visible and palpable wrinkling of skin can occur. Residual skin irregularities at the ends of the incisions or dog ears are always a possibility and may require additional surgery. This may improve with time, or it can be surgically corrected. Allergic Reactions- In some cases, local allergies to tape, suture materials and glues, blood products, topical preparations or injected agents have been reported. Serious systemic reactions including shock (anaphylaxis) may occur to drugs used during surgery and prescription medications. Allergic reactions may require additional treatment. Surgical Anesthesia- Both local and general anesthesia involve risk. There is the possibility of complications, injury, and even death from all forms of surgical anesthesia or sedation. Delayed Healing- Wound disruption or delayed wound healing is possible. Some areas of the skin may not heal normally and may take a long time to heal.  Some areas of skin may die, requiring frequent dressing changes or further surgery to remove the non-healed tissue. Smokers have a greater risk of skin loss and wound healing complications. Change in Skin Sensation- It is common to experience diminished (or loss) of skin sensation in areas that have had surgery. Diminished (or complete loss of skin sensation) may not totally resolve. Shock- In rare circumstances, your surgical procedure can cause severe trauma, particularly when multiple or extensive procedures are performed. Although serious complications are infrequent, infections or excessive fluid loss can lead to severe illness and even death.   If surgical shock occurs, hospitalization and additional treatment would be necessary. Unsatisfactory Result- There is no guarantee or warranty expressed or implied, on the results that may be obtained. You may be disappointed with the results of scar revision surgery. This would include risks such as asymmetry, unacceptable visible deformities at the ends of the incisions (dog ears), loss of function, poor healing, wound disruption, skin death and loss of sensation. It may be necessary to perform additional surgery to improve your results. Cardiac and Pulmonary Complications- Surgery, especially longer procedures, may be associated with the formation of, or increase in, blood clots in the venous system. Pulmonary complications may occur secondarily to both blood clots (pulmonary emboli), fat deposits (fat emboli) or partial collapse of the lungs after general anesthesia. Pulmonary and fat emboli can be life-threatening or fatal in some circumstances. Air travel, inactivity and other conditions may increase the incidence of blood clots traveling to the lungs causing a major blood clot that may result in death. It is important to discuss with your physician any past history of blood clots or swollen legs that may contribute to this condition. Cardiac complications are a risk with any surgery and anesthesia, even in patients without symptoms. If you experience shortness of breath, chest pains, or unusual heart beats, seek medical attention immediately. Should any of these complications occur, you may require hospitalization and additional treatment. Skin Disorders / Skin Cancer:  Skin disorders and skin cancer may occur independently of scar revision surgery. Long-Term Results- Subsequent alterations in scar appearance may occur as the result of aging, weight loss or gain, sun exposure, pregnancy, menopause, or other circumstances not related to scar revision surgery.         Smoking, Second-Hand Smoke Exposure, Nicotine Products (Patch, Gum, Nasal Spray)-   Patients complex equipment, do not make any important decisions, and do not drink any alcohol while taking these medications. Be sure to take your prescribed medication only as directed. If you are on blood thinners such as aspirin, Coumadin, and Plavix etc. you must check with the physician who prescribed him to you prior to stopping them. ADDITIONAL SURGERY NECESSARY  In some situations, it may not be possible to achieve optimal revision of scarring with a single surgical procedure. Multiple procedures may be necessary. Should complications occur, additional surgery or other treatments may be necessary. Even though risks and complications occur infrequently, the risks cited are the ones that are particularly associated with scar revision surgery. Other complications and risks can occur but are even more uncommon. The practice of medicine and surgery is not an exact science. Although good results are expected, there cannot be any guarantee or warranty expressed or implied on the results that may be obtained. PATIENT COMPLIANCE   Follow all physician instructions carefully; this is essential for the success of your outcome. Patient compliance with post-operative activity restriction is critical.  It is important that the surgical incisions are not subjected to excessive force, swelling, abrasion, or motion during the time of healing. Personal and vocational activities that involve the potential for re-injury to the scar revision must be avoided until healing is completed. Protective dressings and drains should not be removed unless instructed by your plastic surgeon. Successful post-operative function depends on both surgery and subsequent care. Physical activity that increases your pulse or heart rate may cause bruising, swelling, fluid accumulation and the need for return to surgery. It is wise to refrain from intimate physical activities after surgery until your physician states it is safe.   It is important that you participate in follow-up care, return for aftercare, and promote your recovery after surgery. HEALTH INSURANCE  Most health insurance companies exclude coverage for cosmetic surgical operations or any complications that might occur from surgery. Please carefully review your health insurance subscriber information pamphlet. Most insurance plans exclude coverage for secondary or revisionary surgery. The cost of surgery involves several charges for the services provided. The total includes fees charged by your surgeon, the cost of surgical supplies, anesthesia, laboratory tests, and possible hospital charges, depending on where the surgery is performed. Depending on whether the cost of surgery is covered by an insurance plan, you will be responsible for necessary co-payments, deductibles, and charges not covered. The fees charged for this procedure do not include any potential future costs for additional procedures that you elect to have or require in order to revise, optimize, or complete your outcome. Additional costs may occur should complications develop from the surgery. Secondary surgery or hospital day-surgery charges involved with revision surgery will also be your responsibility. I also informed the patient that photographs may be taken. These images or illustration along with my medical records created in my case may be used for obtaining insurance approval, for use in examination, may assist in education, testing, credentialing and or certifying by [facility]. These images and records may be used to communicate with referring physician. Cam Álvarez GENERAL INFORMATION  Liposuction is a surgical technique to remove unwanted deposits of fat from specific areas of the body, including the face and neck, upper arms, trunk, abdomen, buttocks, hips and thighs, and the knees, calves and ankles.   This is not a substitute for weight reduction, but a method for removing localized deposits of fatty tissue that do not respond to diet or exercise. Liposuction may be performed as a primary procedure for body contouring or combined with other surgical techniques such as facelift, abdominoplasty, or thigh lift procedures to tighten loose skin and supporting structures. The best candidates for liposuction are individuals of relatively normal weight who have excess fat in particular body areas. Having firm, elastic skin will result in a better final contour after liposuction. Skin that has diminished tone due to stretch marks, weight loss, or natural aging will not reshape itself to the new contours and may require additional surgical techniques to remove and tighten excess skin. Body-contour irregularities due to structures other than fat cannot be improved by this technique. Liposuction by itself will not improve areas of dimpled skin known as cellulite.       Suction-assisted lipectomy surgery is performed by using a hollow metal surgical instrument known as a cannula that is inserted through small skin incision(s) and is passed back and forth through the area of fatty deposit. The cannula is attached to a vacuum source, which provides the suction needed to remove the fatty tissue. There are a variety of different techniques used by plastic surgeons for liposuction and care following surgery. Liposuction may be performed under local or general anesthesia. Tumescent liposuction technique involves the infiltration of fluid containing dilute local anesthetic and epinephrine into areas of fatty deposits. This technique can reduce discomfort at the time of surgery, blood loss, and post-operative bruising. Support garments and dressings are worn to control swelling and promote healing. Your surgeon may recommend that you make arrangements to donate a unit of your own blood that would be used if a blood transfusion were necessary after surgery.     ALTERNATIVE TREATMENTS  Alternative forms of management consist of not treating the areas of fatty deposits. Diet and exercise regimens may be of benefit in the overall reduction of excess body fat. Direct removal of excess skin and fatty tissue may be necessary in addition to liposuction in some patients. Risks and potential complications are associated with alternative surgical forms of treatment. RISKS OF LIPOSUCTION SURGERY  Every surgical procedure involves a certain amount of risk and it is important that you understand these risks and the possible complications associated with them. In addition, every procedure has limitations. An individual's choice to undergo a surgical procedure is based on the comparison of the risk to potential benefit. Although the majority of patients do not experience these complications, you should discuss each of them with your plastic surgeon to make sure you completely understand all possible consequences of liposuction. Patient Selection- Individuals with poor skin tone, medical problems, obesity, or unrealistic expectations may not be candidates for liposuction. Bleeding- It is possible, though unusual, to experience a bleeding episode during or after surgery. Should post-operative bleeding occur, it may require an emergency treatment to drain the accumulated blood or blood transfusion. Intra-operative blood transfusions may be required. Hematoma can occur at any time following injury and may contribute to infection or other problems. Heparin medications that are used to prevent blood clots in veins can produce bleeding and decreased blood platelets. Do not take any aspirin or anti-inflammatory medications for ten days before or after surgery, as this may increase the risk of bleeding. Non-prescription herbs and dietary supplements can increase the risk of surgical bleeding.   If blood transfusions are needed to treat blood loss, there is a risk of blood-related infections such as hepatitis and HIV (AIDS). Heparin medications that are used to prevent blood clots in veins can produce bleeding and decreased blood platelets. If you are on any blood thinners, he would need to check with your prescribing physician to determine if you are allowed to discontinue down prior to surgery. Infection- Infection is unusual after surgery. Should an infection occur, additional treatment including antibiotics, hospitalization, or additional surgery may be necessary. In extremely rare instances, life-threatening infections, including toxic shock syndrome have been noted after liposuction surgery. Scarring- All surgery leaves scars, some more visible than others. Although good wound healing after a surgical procedure is expected, abnormal scars may occur within the skin and deeper tissues. Scars may be unattractive and of different color than surrounding skin. Scar appearance may also vary within the same scar, exhibit contour variations and \"bunching\" due to the amount of excess skin. Scars may be asymmetrical (appear different between right and left side of the body). There is the possibility of visible marks in the skin from sutures. In some cases, scars may require surgical revision or treatment. Change in Skin Sensation- It is common to experience diminished (or loss) of skin sensation in areas that have had surgery. This usually resolves over a period of time. Diminished (or complete loss of skin sensation) infrequently occurs and may not totally resolve. Skin Discoloration / Swelling- Bruising and swelling normally occurs following liposuction. The skin in or near the surgical site can appear either lighter or darker than surrounding skin. Although uncommon, swelling and skin discoloration may persist for long periods of time and, in rare situations, may be permanent.       Skin Contour Irregularities- Contour and shape irregularities and depressions may occur after liposuction. Visible and palpable wrinkling of skin can occur. Residual skin irregularities at the ends of the incisions or dog ears are always a possibility as is skin pleating when there is excessive redundant skin. This may improve with time, or it can be surgically corrected. Asymmetry- Symmetrical body appearance may not result from liposuction surgery. Factors such as skin tone, fatty deposits, skeletal prominence, and muscle tone may contribute to normal asymmetry in body features. Additional surgery may be necessary to attempt to improve asymmetry. Seroma- Fluid accumulations infrequently occur in areas where liposuction has been performed. Additional treatments or surgery to drain accumulations of fluid may be necessary. Surgical Anesthesia- Both local and general anesthesia involve risk. There is the possibility of complications, injury, and even death from all forms of surgical anesthesia or sedation. Pain- You will experience pain after your surgery. Pain of varying intensity and duration may occur and persist after liposuction surgery. Chronic pain may occur very infrequently from nerves becoming trapped in scar tissue. Skin Sensitivity- Itching, tenderness, or exaggerated responses to hot or cold temperatures may occur after surgery. Usually this resolve during healing, but in rare situations it may be chronic. Damage to Deeper Structures- There is the potential for injury to deeper structures including nerves, blood vessels, muscles, and lungs (pneumothorax) during any surgical procedure. The potential for this to occur varies according to the type of procedure being performed. Injury to deeper structures may be temporary or permanent. Delayed Healing- Wound disruption or delayed wound healing is possible. Some areas may not heal normally and may take a long time to heal.  Some areas of skin may die.   This may require frequent dressing changes or further surgery to remove the non-healed tissue. Smokers have a greater risk of skin loss and wound healing complications. Allergic Reactions- In rare cases, local allergies to tape, suture material and glues, blood products, topical preparations or injected agents have been reported. Serious systemic reactions including shock (anaphylaxis) may occur to drugs used during surgery and prescription medications. Allergic reactions may require additional treatment. Fat Necrosis- Fatty tissue found deep in the skin might die. This may produce areas of firmness within the skin. Additional surgery to remove areas of fat necrosis may be necessary. There is the possibility of contour irregularities in the skin that may result from fat necrosis. Pubic Distortion- It is possible, though unusual, for women to develop distortion of their labia and pubic area. Should this occur, additional treatment including surgery may be necessary. Umbilicus- Malposition, scarring, unacceptable appearance or loss of the umbilicus (navel) may occur. Persistent Swelling (Lymphedema)- Persistent swelling in the legs can occur following liposuction. Surgical Shock- In rare circumstances, liposuction can cause severe trauma, particularly when multiple or extensive areas are suctioned at one time. Although serious complications are infrequent, infections or excessive fluid/blood loss can lead to severe illness and even death. If surgical shock occurs after liposuction, hospitalization and additional treatment would be necessary. Individuals undergoing liposuction procedures where a large volume of fat is removed are at greater risk of complications. Patients contemplating large volume liposuction, greater than 5000 cc's, may be advised to have postoperative monitoring and aftercare that involves overnight hospitalization.     Deep Venous Thrombosis, Cardiac and Pulmonary Complications- Surgery, especially longer procedures, may be associated with the formation of, or increase in, blood clots in the venous system. Fat embolism syndrome occurs when fat droplets are trapped in the lungs. This is a very rare and possibly fatal complication of suction assisted Lipectomy. Pulmonary complications may occur secondarily to both blood clots (pulmonary emboli), fat deposits (fat emboli) or partial collapse of the lungs after general anesthesia. Pulmonary and fat emboli can be life-threatening or fatal in some circumstances. Inactivity and other conditions may increase the incidence of blood clots traveling to the lungs causing a major blood clot that may result in death. It is important to discuss with your physician any past history of blood clots, swollen legs or the use of estrogen or birth control pills that may contribute to this condition. Cardiac complications are a risk with any surgery and anesthesia, even in patients without symptoms. If you experience shortness of breath, chest pains, or unusual heart beats, seek medical attention immediately. Should any of these complications occur, you may require hospitalization and additional treatment. Tumescent Liposuction-There is the possibility that large volumes of fluid containing dilute local anesthetic drugs and epinephrine that is injected into fatty deposits during surgery may contribute to fluid overload or systemic reaction to these medications. Additional treatment including hospitalization may be necessary. Unsatisfactory Result- Although good results are expected, there is no guarantee or warranty expressed or implied, on the results that may be obtained. You may be disappointed with the results of liposuction surgery.   This would include risks such as asymmetry, unsatisfactory or highly visible surgical scar location, unacceptable visible deformities, bunching and rippling in the skin near the suture lines or at the ends of the incisions (dog ears), poor healing, wound disruption, and loss of sensation. It may not be possible to correct or improve the effects of surgical scars. Additional surgery may be required to attempt to improve results. ADDITIONAL ADVISORIES    Metabolic Status of Massive Weight Loss Patients- Your personal metabolic status of blood chemistry and protein levels may be abnormal following massive weight loss and surgical procedures to make a patient lose weight. Individuals with abnormalities may be at risk for serious medical and surgical complications, including delayed wound healing, infection or even in rare cases, death. If you're BMI is greater than 30 UR at high-risk of postsurgical complications such as when healing issues and infections. Long-Term Results- Subsequent alterations in the appearance of your body may occur as the result of aging, sun exposure, weight loss, weight gain, pregnancy, menopause or other circumstances not related to your surgery. Intimate Relations After Surgery- Surgery involves coagulating of blood vessels and increased activity of any kind may open these vessels leading to a bleed, or hematoma. Activity that increases your pulse or heart rate may cause additional bruising, swelling, and the need for return to surgery and control bleeding. It is wise to refrain from sexual activity until your physician states it is safe. Body-Piercing Procedures- Individuals who currently wear body-piercing jewelry or are seeking to undergo body-piercing procedures must consider the possibility that an infection could develop anytime following this procedure. Treatment including antibiotics, hospitalization or additional surgery may be necessary. Mental Health Disorders and Elective Surgery- It is important that all patients seeking to undergo elective surgery have realistic expectations that focus on improvement rather than perfection.   Complications or less than satisfactory results are sometimes unavoidable, may require additional surgery and often are stressful. Please openly discuss with your surgeon, prior to surgery, any history that you may have of significant emotional depression or mental health disorders. Although many individuals may benefit psychologically from the results of elective surgery, effects on mental health cannot be accurately predicted. Medications- There are many adverse reactions that occur as the result of taking over the counter, herbal, and/or prescription medications. Be sure to check with your physician about any drug interactions that may exist with medications which you are already taking. If you have an adverse reaction, stop the drugs immediately and call your plastic surgeon for further instructions. If the reaction is severe, go immediately to the nearest emergency room. When taking the prescribed pain medications after surgery, realize that they can affect your thought process and coordination. Do not drive, do not operate complex equipment, do not make any important decisions and do not drink any alcohol while taking these medications. Be sure to take your prescribed medication only as directed. Smoking, Second-Hand Smoke Exposure, Nicotine Products (Patch, Gum, Nasal Spray)-   Patients who are currently smoking, use tobacco products, or nicotine products (patch, gum, or nasal spray) are at a greater risk for significant surgical complications of skin dying, delayed healing, and additional scarring. Individuals exposed to second-hand smoke are also at potential risk for similar complications attributable to nicotine exposure. Additionally, smoking may have a significant negative effect on anesthesia and recovery from anesthesia, with coughing and possibly increased bleeding. Individuals who are not exposed to tobacco smoke or nicotine-containing products have a significantly lower risk of this type of complication.  You must stop smoking 6 weeks before and 6 weeks after your surgery. ADDITIONAL SURGERY NECESSARY  There are many variable conditions in addition to risk and potential surgical complications that may influence the long-term result from liposuction. Secondary surgery may be necessary to obtain optimal results. Even though risks and complications occur infrequently, the risks cited are particularly associated with a liposuction surgery. Other complications and risks can occur but are even more uncommon. Should complications occur, additional surgery or other treatments may be necessary. The practice of medicine and surgery is not an exact science. Although good results are expected, there is no guarantee or warranty expressed or implied, on the results that may be obtained. PATIENT COMPLIANCE   Follow all physician instructions carefully; this is essential for the success of your outcome. It is important that the surgical incisions are not subjected to excessive force, swelling, abrasion, or motion during the time of healing. Personal and vocational activity needs to be restricted. Protective dressings and drains should not be removed unless instructed by your plastic surgeon. Successful post-operative function depends on both surgery and subsequent care. Physical activity that increases your pulse or heart rate may cause bruising, swelling, fluid accumulation and the need for return to surgery. It is wise to refrain from intimate physical activities after surgery until your physician states it is safe. It is important that you participate in follow-up care, return for aftercare, and promote your recovery after surgery. HEALTH INSURANCE  Most health insurance companies exclude coverage for cosmetic surgical operations such as liposuction surgery or any complications that might occur from surgery.   Please carefully review your health insurance subscriber-information pamphlet or contact your insurance company for a detailed explanation of their policies. Most insurance plans exclude coverage for secondary or revisionary surgery. FINANCIAL RESPONSIBILITIES  The cost of surgery involves several charges for the services provided. The total includes fees charged by your surgeon, the cost of surgical supplies, anesthesia, laboratory tests, and possible outpatient hospital charges, depending on where the surgery is performed. Depending on whether the cost of surgery is covered by an insurance plan, you will be responsible for necessary co-payments, deductibles, and charges not covered. The fees charged for this procedure do not include any potential future costs for additional procedures that you elect to have or require in order to revise, optimize, or complete your outcome. Additional costs may occur should complications develop from the surgery. Secondary surgery or hospital day-surgery charges involved with revision surgery will also be your responsibility. In signing the consent for this surgery/procedure, you acknowledge that you have been informed about its risk and consequences and accept responsibility for the clinical decisions that were made along with the financial costs of all future treatments.       Electronically signed by:  Cassia Bowen MD 2/11/2022

## 2022-02-16 ENCOUNTER — ANESTHESIA EVENT (OUTPATIENT)
Dept: OPERATING ROOM | Age: 47
End: 2022-02-16
Payer: COMMERCIAL

## 2022-02-16 ENCOUNTER — HOSPITAL ENCOUNTER (OUTPATIENT)
Age: 47
Setting detail: SPECIMEN
Discharge: HOME | End: 2022-02-16
Payer: COMMERCIAL

## 2022-02-16 DIAGNOSIS — E65 SYMPTOMATIC ABDOMINAL PANNICULUS: ICD-10-CM

## 2022-02-16 DIAGNOSIS — E65 SYMPTOMATIC ABDOMINAL PANNICULUS: Primary | ICD-10-CM

## 2022-02-16 DIAGNOSIS — N39.0 UTI (URINARY TRACT INFECTION), BACTERIAL: ICD-10-CM

## 2022-02-16 DIAGNOSIS — A49.9 UTI (URINARY TRACT INFECTION), BACTERIAL: ICD-10-CM

## 2022-02-16 LAB
-: NORMAL
AMORPHOUS: NORMAL
BACTERIA: NORMAL
BILIRUBIN URINE: NEGATIVE
CASTS UA: NORMAL /LPF (ref 0–2)
COLOR: YELLOW
COMMENT UA: ABNORMAL
CRYSTALS, UA: NORMAL /HPF
EPITHELIAL CELLS UA: NORMAL /HPF (ref 0–5)
GLUCOSE URINE: NEGATIVE
KETONES, URINE: NEGATIVE
LEUKOCYTE ESTERASE, URINE: NEGATIVE
MUCUS: NORMAL
NITRITE, URINE: NEGATIVE
OTHER OBSERVATIONS UA: NORMAL
PH UA: 6.5 (ref 5–8)
PROTEIN UA: NEGATIVE
RBC UA: NORMAL /HPF (ref 0–2)
RENAL EPITHELIAL, UA: NORMAL /HPF
SPECIFIC GRAVITY UA: 1 (ref 1–1.03)
TRICHOMONAS: NORMAL
TURBIDITY: CLEAR
URINE HGB: NEGATIVE
UROBILINOGEN, URINE: NORMAL
WBC UA: NORMAL /HPF (ref 0–5)
YEAST: NORMAL

## 2022-02-16 PROCEDURE — 81001 URINALYSIS AUTO W/SCOPE: CPT

## 2022-02-17 ENCOUNTER — ANESTHESIA (OUTPATIENT)
Dept: OPERATING ROOM | Age: 47
End: 2022-02-17
Payer: COMMERCIAL

## 2022-02-17 ENCOUNTER — HOSPITAL ENCOUNTER (OUTPATIENT)
Age: 47
Setting detail: OBSERVATION
Discharge: HOME | End: 2022-02-18
Attending: PLASTIC SURGERY | Admitting: PLASTIC SURGERY
Payer: COMMERCIAL

## 2022-02-17 VITALS — DIASTOLIC BLOOD PRESSURE: 79 MMHG | OXYGEN SATURATION: 100 % | SYSTOLIC BLOOD PRESSURE: 148 MMHG | TEMPERATURE: 97.2 F

## 2022-02-17 DIAGNOSIS — Z01.818 PRE-OP TESTING: Primary | ICD-10-CM

## 2022-02-17 DIAGNOSIS — G89.18 POST-OP PAIN: ICD-10-CM

## 2022-02-17 PROBLEM — Z98.890 STATUS POST SCAR REVISION: Status: ACTIVE | Noted: 2022-02-17

## 2022-02-17 LAB
HCG, PREGNANCY URINE (POC): NEGATIVE
HCT VFR BLD CALC: 37.9 % (ref 36–46)
HEMOGLOBIN: 12.6 G/DL (ref 12–16)
MCH RBC QN AUTO: 31.3 PG (ref 26–34)
MCHC RBC AUTO-ENTMCNC: 33.1 G/DL (ref 31–37)
MCV RBC AUTO: 94.5 FL (ref 80–100)
NRBC AUTOMATED: ABNORMAL PER 100 WBC
PDW BLD-RTO: 15.3 % (ref 12.5–15.4)
PLATELET # BLD: 313 K/UL (ref 140–450)
PMV BLD AUTO: 8.4 FL (ref 6–12)
RBC # BLD: 4.02 M/UL (ref 4–5.2)
WBC # BLD: 11.8 K/UL (ref 3.5–11)

## 2022-02-17 PROCEDURE — 81025 URINE PREGNANCY TEST: CPT

## 2022-02-17 PROCEDURE — 7100000010 HC PHASE II RECOVERY - FIRST 15 MIN: Performed by: PLASTIC SURGERY

## 2022-02-17 PROCEDURE — 2709999900 HC NON-CHARGEABLE SUPPLY: Performed by: PLASTIC SURGERY

## 2022-02-17 PROCEDURE — 2500000003 HC RX 250 WO HCPCS: Performed by: ANESTHESIOLOGY

## 2022-02-17 PROCEDURE — 88304 TISSUE EXAM BY PATHOLOGIST: CPT

## 2022-02-17 PROCEDURE — 6370000000 HC RX 637 (ALT 250 FOR IP): Performed by: PLASTIC SURGERY

## 2022-02-17 PROCEDURE — 7100000001 HC PACU RECOVERY - ADDTL 15 MIN: Performed by: PLASTIC SURGERY

## 2022-02-17 PROCEDURE — 3700000001 HC ADD 15 MINUTES (ANESTHESIA): Performed by: PLASTIC SURGERY

## 2022-02-17 PROCEDURE — 3600000002 HC SURGERY LEVEL 2 BASE: Performed by: PLASTIC SURGERY

## 2022-02-17 PROCEDURE — 2720000010 HC SURG SUPPLY STERILE: Performed by: PLASTIC SURGERY

## 2022-02-17 PROCEDURE — 6360000002 HC RX W HCPCS: Performed by: PLASTIC SURGERY

## 2022-02-17 PROCEDURE — 6360000002 HC RX W HCPCS: Performed by: ANESTHESIOLOGY

## 2022-02-17 PROCEDURE — 6370000000 HC RX 637 (ALT 250 FOR IP)

## 2022-02-17 PROCEDURE — 15839 EXC EXCESSIVE SKN OTHER AREA: CPT | Performed by: PLASTIC SURGERY

## 2022-02-17 PROCEDURE — 6360000002 HC RX W HCPCS

## 2022-02-17 PROCEDURE — 2500000003 HC RX 250 WO HCPCS: Performed by: NURSE ANESTHETIST, CERTIFIED REGISTERED

## 2022-02-17 PROCEDURE — 3600000012 HC SURGERY LEVEL 2 ADDTL 15MIN: Performed by: PLASTIC SURGERY

## 2022-02-17 PROCEDURE — 6360000002 HC RX W HCPCS: Performed by: NURSE ANESTHETIST, CERTIFIED REGISTERED

## 2022-02-17 PROCEDURE — 3700000000 HC ANESTHESIA ATTENDED CARE: Performed by: PLASTIC SURGERY

## 2022-02-17 PROCEDURE — 7100000000 HC PACU RECOVERY - FIRST 15 MIN: Performed by: PLASTIC SURGERY

## 2022-02-17 PROCEDURE — 7100000011 HC PHASE II RECOVERY - ADDTL 15 MIN: Performed by: PLASTIC SURGERY

## 2022-02-17 PROCEDURE — 85027 COMPLETE CBC AUTOMATED: CPT

## 2022-02-17 PROCEDURE — 2580000003 HC RX 258: Performed by: PLASTIC SURGERY

## 2022-02-17 PROCEDURE — 15877 SUCTION LIPECTOMY TRUNK: CPT | Performed by: PLASTIC SURGERY

## 2022-02-17 PROCEDURE — 2580000003 HC RX 258: Performed by: ANESTHESIOLOGY

## 2022-02-17 PROCEDURE — G0378 HOSPITAL OBSERVATION PER HR: HCPCS

## 2022-02-17 RX ORDER — ONDANSETRON 2 MG/ML
4 INJECTION INTRAMUSCULAR; INTRAVENOUS EVERY 6 HOURS PRN
Status: DISCONTINUED | OUTPATIENT
Start: 2022-02-17 | End: 2022-02-18 | Stop reason: HOSPADM

## 2022-02-17 RX ORDER — ROCURONIUM BROMIDE 10 MG/ML
INJECTION, SOLUTION INTRAVENOUS PRN
Status: DISCONTINUED | OUTPATIENT
Start: 2022-02-17 | End: 2022-02-17 | Stop reason: SDUPTHER

## 2022-02-17 RX ORDER — CEFAZOLIN SODIUM 2 G/50ML
SOLUTION INTRAVENOUS PRN
Status: DISCONTINUED | OUTPATIENT
Start: 2022-02-17 | End: 2022-02-17 | Stop reason: SDUPTHER

## 2022-02-17 RX ORDER — SODIUM CHLORIDE 0.9 % (FLUSH) 0.9 %
10 SYRINGE (ML) INJECTION EVERY 12 HOURS SCHEDULED
Status: DISCONTINUED | OUTPATIENT
Start: 2022-02-17 | End: 2022-02-17 | Stop reason: HOSPADM

## 2022-02-17 RX ORDER — BACLOFEN 10 MG/1
10 TABLET ORAL 3 TIMES DAILY
Status: DISCONTINUED | OUTPATIENT
Start: 2022-02-17 | End: 2022-02-18 | Stop reason: HOSPADM

## 2022-02-17 RX ORDER — HYDROCODONE BITARTRATE AND ACETAMINOPHEN 5; 325 MG/1; MG/1
1 TABLET ORAL ONCE
Status: DISCONTINUED | OUTPATIENT
Start: 2022-02-17 | End: 2022-02-18 | Stop reason: HOSPADM

## 2022-02-17 RX ORDER — SODIUM CHLORIDE 9 MG/ML
25 INJECTION, SOLUTION INTRAVENOUS PRN
Status: DISCONTINUED | OUTPATIENT
Start: 2022-02-17 | End: 2022-02-17 | Stop reason: HOSPADM

## 2022-02-17 RX ORDER — ONDANSETRON 4 MG/1
4 TABLET, ORALLY DISINTEGRATING ORAL ONCE
Status: COMPLETED | OUTPATIENT
Start: 2022-02-17 | End: 2022-02-17

## 2022-02-17 RX ORDER — FENTANYL CITRATE 50 UG/ML
INJECTION, SOLUTION INTRAMUSCULAR; INTRAVENOUS PRN
Status: DISCONTINUED | OUTPATIENT
Start: 2022-02-17 | End: 2022-02-17 | Stop reason: SDUPTHER

## 2022-02-17 RX ORDER — MORPHINE SULFATE 1 MG/ML
1 INJECTION, SOLUTION EPIDURAL; INTRATHECAL; INTRAVENOUS EVERY 5 MIN PRN
Status: DISCONTINUED | OUTPATIENT
Start: 2022-02-17 | End: 2022-02-17 | Stop reason: HOSPADM

## 2022-02-17 RX ORDER — MEPERIDINE HYDROCHLORIDE 50 MG/ML
12.5 INJECTION INTRAMUSCULAR; INTRAVENOUS; SUBCUTANEOUS EVERY 5 MIN PRN
Status: DISCONTINUED | OUTPATIENT
Start: 2022-02-17 | End: 2022-02-17 | Stop reason: HOSPADM

## 2022-02-17 RX ORDER — PROPOFOL 10 MG/ML
INJECTION, EMULSION INTRAVENOUS PRN
Status: DISCONTINUED | OUTPATIENT
Start: 2022-02-17 | End: 2022-02-17 | Stop reason: SDUPTHER

## 2022-02-17 RX ORDER — LIDOCAINE HYDROCHLORIDE 10 MG/ML
1 INJECTION, SOLUTION EPIDURAL; INFILTRATION; INTRACAUDAL; PERINEURAL
Status: DISCONTINUED | OUTPATIENT
Start: 2022-02-17 | End: 2022-02-17 | Stop reason: HOSPADM

## 2022-02-17 RX ORDER — VANCOMYCIN HYDROCHLORIDE 1 G/20ML
INJECTION, POWDER, LYOPHILIZED, FOR SOLUTION INTRAVENOUS
Status: DISPENSED
Start: 2022-02-17 | End: 2022-02-18

## 2022-02-17 RX ORDER — OXYCODONE HYDROCHLORIDE 5 MG/1
10 TABLET ORAL EVERY 4 HOURS PRN
Status: DISCONTINUED | OUTPATIENT
Start: 2022-02-17 | End: 2022-02-18 | Stop reason: HOSPADM

## 2022-02-17 RX ORDER — SODIUM CHLORIDE 0.9 % (FLUSH) 0.9 %
5-40 SYRINGE (ML) INJECTION EVERY 12 HOURS SCHEDULED
Status: DISCONTINUED | OUTPATIENT
Start: 2022-02-17 | End: 2022-02-17 | Stop reason: HOSPADM

## 2022-02-17 RX ORDER — ONDANSETRON 2 MG/ML
INJECTION INTRAMUSCULAR; INTRAVENOUS PRN
Status: DISCONTINUED | OUTPATIENT
Start: 2022-02-17 | End: 2022-02-17 | Stop reason: SDUPTHER

## 2022-02-17 RX ORDER — LIDOCAINE HYDROCHLORIDE 10 MG/ML
INJECTION, SOLUTION EPIDURAL; INFILTRATION; INTRACAUDAL; PERINEURAL PRN
Status: DISCONTINUED | OUTPATIENT
Start: 2022-02-17 | End: 2022-02-17 | Stop reason: SDUPTHER

## 2022-02-17 RX ORDER — GABAPENTIN 300 MG/1
300 CAPSULE ORAL 3 TIMES DAILY
Qty: 30 CAPSULE | Refills: 0 | Status: SHIPPED | OUTPATIENT
Start: 2022-02-17 | End: 2022-02-27

## 2022-02-17 RX ORDER — ACETAMINOPHEN 325 MG/1
650 TABLET ORAL EVERY 6 HOURS
Status: DISCONTINUED | OUTPATIENT
Start: 2022-02-17 | End: 2022-02-18 | Stop reason: HOSPADM

## 2022-02-17 RX ORDER — SODIUM CHLORIDE 0.9 % (FLUSH) 0.9 %
10 SYRINGE (ML) INJECTION EVERY 12 HOURS SCHEDULED
Status: DISCONTINUED | OUTPATIENT
Start: 2022-02-17 | End: 2022-02-18 | Stop reason: HOSPADM

## 2022-02-17 RX ORDER — LIDOCAINE HYDROCHLORIDE 10 MG/ML
INJECTION, SOLUTION EPIDURAL; INFILTRATION; INTRACAUDAL; PERINEURAL
Status: DISPENSED
Start: 2022-02-17 | End: 2022-02-18

## 2022-02-17 RX ORDER — ONDANSETRON 4 MG/1
TABLET, ORALLY DISINTEGRATING ORAL
Status: COMPLETED
Start: 2022-02-17 | End: 2022-02-17

## 2022-02-17 RX ORDER — SODIUM CHLORIDE 0.9 % (FLUSH) 0.9 %
10 SYRINGE (ML) INJECTION PRN
Status: DISCONTINUED | OUTPATIENT
Start: 2022-02-17 | End: 2022-02-18 | Stop reason: HOSPADM

## 2022-02-17 RX ORDER — SODIUM CHLORIDE 0.9 % (FLUSH) 0.9 %
5-40 SYRINGE (ML) INJECTION PRN
Status: DISCONTINUED | OUTPATIENT
Start: 2022-02-17 | End: 2022-02-17 | Stop reason: HOSPADM

## 2022-02-17 RX ORDER — SODIUM CHLORIDE 0.9 % (FLUSH) 0.9 %
10 SYRINGE (ML) INJECTION PRN
Status: DISCONTINUED | OUTPATIENT
Start: 2022-02-17 | End: 2022-02-17 | Stop reason: HOSPADM

## 2022-02-17 RX ORDER — GENTAMICIN SULFATE 40 MG/ML
INJECTION, SOLUTION INTRAMUSCULAR; INTRAVENOUS
Status: DISPENSED
Start: 2022-02-17 | End: 2022-02-18

## 2022-02-17 RX ORDER — TRANEXAMIC ACID 10 MG/ML
INJECTION, SOLUTION INTRAVENOUS
Status: COMPLETED
Start: 2022-02-17 | End: 2022-02-17

## 2022-02-17 RX ORDER — ONDANSETRON 2 MG/ML
4 INJECTION INTRAMUSCULAR; INTRAVENOUS
Status: COMPLETED | OUTPATIENT
Start: 2022-02-17 | End: 2022-02-17

## 2022-02-17 RX ORDER — SODIUM CHLORIDE 9 MG/ML
INJECTION, SOLUTION INTRAVENOUS CONTINUOUS
Status: DISCONTINUED | OUTPATIENT
Start: 2022-02-17 | End: 2022-02-18 | Stop reason: HOSPADM

## 2022-02-17 RX ORDER — SODIUM CHLORIDE 9 MG/ML
25 INJECTION, SOLUTION INTRAVENOUS PRN
Status: DISCONTINUED | OUTPATIENT
Start: 2022-02-17 | End: 2022-02-18 | Stop reason: HOSPADM

## 2022-02-17 RX ORDER — TRANEXAMIC ACID 10 MG/ML
INJECTION, SOLUTION INTRAVENOUS PRN
Status: DISCONTINUED | OUTPATIENT
Start: 2022-02-17 | End: 2022-02-17 | Stop reason: SDUPTHER

## 2022-02-17 RX ORDER — OXYCODONE HYDROCHLORIDE 5 MG/1
5 TABLET ORAL EVERY 4 HOURS PRN
Status: DISCONTINUED | OUTPATIENT
Start: 2022-02-17 | End: 2022-02-18 | Stop reason: HOSPADM

## 2022-02-17 RX ORDER — SODIUM CHLORIDE, SODIUM LACTATE, POTASSIUM CHLORIDE, CALCIUM CHLORIDE 600; 310; 30; 20 MG/100ML; MG/100ML; MG/100ML; MG/100ML
INJECTION, SOLUTION INTRAVENOUS CONTINUOUS
Status: DISCONTINUED | OUTPATIENT
Start: 2022-02-17 | End: 2022-02-18 | Stop reason: HOSPADM

## 2022-02-17 RX ORDER — DIPHENHYDRAMINE HYDROCHLORIDE 50 MG/ML
12.5 INJECTION INTRAMUSCULAR; INTRAVENOUS
Status: DISCONTINUED | OUTPATIENT
Start: 2022-02-17 | End: 2022-02-17 | Stop reason: HOSPADM

## 2022-02-17 RX ORDER — HYDROCODONE BITARTRATE AND ACETAMINOPHEN 5; 325 MG/1; MG/1
1 TABLET ORAL EVERY 6 HOURS PRN
Qty: 28 TABLET | Refills: 0 | Status: SHIPPED | OUTPATIENT
Start: 2022-02-17 | End: 2022-02-24

## 2022-02-17 RX ORDER — GABAPENTIN 100 MG/1
100 CAPSULE ORAL 3 TIMES DAILY
Status: DISCONTINUED | OUTPATIENT
Start: 2022-02-17 | End: 2022-02-18 | Stop reason: HOSPADM

## 2022-02-17 RX ORDER — NEOSTIGMINE METHYLSULFATE 5 MG/5 ML
SYRINGE (ML) INTRAVENOUS PRN
Status: DISCONTINUED | OUTPATIENT
Start: 2022-02-17 | End: 2022-02-17 | Stop reason: SDUPTHER

## 2022-02-17 RX ORDER — ONDANSETRON 2 MG/ML
INJECTION INTRAMUSCULAR; INTRAVENOUS
Status: DISPENSED
Start: 2022-02-17 | End: 2022-02-18

## 2022-02-17 RX ORDER — GLYCOPYRROLATE 1 MG/5 ML
SYRINGE (ML) INTRAVENOUS PRN
Status: DISCONTINUED | OUTPATIENT
Start: 2022-02-17 | End: 2022-02-17 | Stop reason: SDUPTHER

## 2022-02-17 RX ORDER — MORPHINE SULFATE 10 MG/ML
INJECTION, SOLUTION INTRAMUSCULAR; INTRAVENOUS PRN
Status: DISCONTINUED | OUTPATIENT
Start: 2022-02-17 | End: 2022-02-17 | Stop reason: SDUPTHER

## 2022-02-17 RX ORDER — DEXAMETHASONE SODIUM PHOSPHATE 10 MG/ML
INJECTION INTRAMUSCULAR; INTRAVENOUS PRN
Status: DISCONTINUED | OUTPATIENT
Start: 2022-02-17 | End: 2022-02-17 | Stop reason: SDUPTHER

## 2022-02-17 RX ORDER — BACLOFEN 10 MG/1
10 TABLET ORAL 3 TIMES DAILY
Qty: 30 TABLET | Refills: 0 | Status: SHIPPED | OUTPATIENT
Start: 2022-02-17 | End: 2022-02-27

## 2022-02-17 RX ORDER — HYDROCODONE BITARTRATE AND ACETAMINOPHEN 5; 325 MG/1; MG/1
TABLET ORAL
Status: COMPLETED
Start: 2022-02-17 | End: 2022-02-17

## 2022-02-17 RX ORDER — MIDAZOLAM HYDROCHLORIDE 1 MG/ML
INJECTION INTRAMUSCULAR; INTRAVENOUS PRN
Status: DISCONTINUED | OUTPATIENT
Start: 2022-02-17 | End: 2022-02-17 | Stop reason: SDUPTHER

## 2022-02-17 RX ORDER — CIPROFLOXACIN 500 MG/1
500 TABLET, FILM COATED ORAL 2 TIMES DAILY
Qty: 20 TABLET | Refills: 0 | Status: SHIPPED | OUTPATIENT
Start: 2022-02-17 | End: 2022-02-27

## 2022-02-17 RX ORDER — PROMETHAZINE HYDROCHLORIDE 25 MG/1
12.5 TABLET ORAL EVERY 6 HOURS PRN
Status: DISCONTINUED | OUTPATIENT
Start: 2022-02-17 | End: 2022-02-18 | Stop reason: HOSPADM

## 2022-02-17 RX ADMIN — LIDOCAINE HYDROCHLORIDE 50 MG: 10 INJECTION, SOLUTION EPIDURAL; INFILTRATION; INTRACAUDAL at 14:53

## 2022-02-17 RX ADMIN — TRANEXAMIC ACID 1 G: 10 INJECTION, SOLUTION INTRAVENOUS at 17:08

## 2022-02-17 RX ADMIN — HYDROMORPHONE HYDROCHLORIDE 0.5 MG: 1 INJECTION, SOLUTION INTRAMUSCULAR; INTRAVENOUS; SUBCUTANEOUS at 18:55

## 2022-02-17 RX ADMIN — SODIUM CHLORIDE, POTASSIUM CHLORIDE, SODIUM LACTATE AND CALCIUM CHLORIDE: 600; 310; 30; 20 INJECTION, SOLUTION INTRAVENOUS at 14:49

## 2022-02-17 RX ADMIN — HYDROCODONE BITARTRATE AND ACETAMINOPHEN 1 TABLET: 5; 325 TABLET ORAL at 18:56

## 2022-02-17 RX ADMIN — MORPHINE SULFATE 5 MG: 10 INJECTION, SOLUTION INTRAMUSCULAR; INTRAVENOUS at 18:17

## 2022-02-17 RX ADMIN — CEFAZOLIN SODIUM 2000 MG: 2 SOLUTION INTRAVENOUS at 15:04

## 2022-02-17 RX ADMIN — DEXAMETHASONE SODIUM PHOSPHATE 10 MG: 10 INJECTION INTRAMUSCULAR; INTRAVENOUS at 15:15

## 2022-02-17 RX ADMIN — Medication 0.4 MG: at 18:21

## 2022-02-17 RX ADMIN — GABAPENTIN 100 MG: 100 CAPSULE ORAL at 21:31

## 2022-02-17 RX ADMIN — ONDANSETRON 4 MG: 2 INJECTION INTRAMUSCULAR; INTRAVENOUS at 22:34

## 2022-02-17 RX ADMIN — ONDANSETRON 4 MG: 2 INJECTION INTRAMUSCULAR; INTRAVENOUS at 18:42

## 2022-02-17 RX ADMIN — ACETAMINOPHEN 650 MG: 325 TABLET ORAL at 21:31

## 2022-02-17 RX ADMIN — ONDANSETRON 4 MG: 4 TABLET, ORALLY DISINTEGRATING ORAL at 19:55

## 2022-02-17 RX ADMIN — FENTANYL CITRATE 50 MCG: 50 INJECTION, SOLUTION INTRAMUSCULAR; INTRAVENOUS at 15:47

## 2022-02-17 RX ADMIN — FENTANYL CITRATE 100 MCG: 50 INJECTION, SOLUTION INTRAMUSCULAR; INTRAVENOUS at 14:54

## 2022-02-17 RX ADMIN — Medication 2 MG: at 18:22

## 2022-02-17 RX ADMIN — HYDROMORPHONE HYDROCHLORIDE 0.5 MG: 1 INJECTION, SOLUTION INTRAMUSCULAR; INTRAVENOUS; SUBCUTANEOUS at 18:42

## 2022-02-17 RX ADMIN — SODIUM CHLORIDE: 9 INJECTION, SOLUTION INTRAVENOUS at 21:28

## 2022-02-17 RX ADMIN — FENTANYL CITRATE 50 MCG: 50 INJECTION, SOLUTION INTRAMUSCULAR; INTRAVENOUS at 16:20

## 2022-02-17 RX ADMIN — ONDANSETRON 4 MG: 2 INJECTION INTRAMUSCULAR; INTRAVENOUS at 18:14

## 2022-02-17 RX ADMIN — ROCURONIUM BROMIDE 50 MG: 10 INJECTION INTRAVENOUS at 14:53

## 2022-02-17 RX ADMIN — MIDAZOLAM HYDROCHLORIDE 2 MG: 1 INJECTION, SOLUTION INTRAMUSCULAR; INTRAVENOUS at 14:49

## 2022-02-17 RX ADMIN — MORPHINE SULFATE 2 MG: 10 INJECTION, SOLUTION INTRAMUSCULAR; INTRAVENOUS at 18:28

## 2022-02-17 RX ADMIN — SODIUM CHLORIDE, POTASSIUM CHLORIDE, SODIUM LACTATE AND CALCIUM CHLORIDE: 600; 310; 30; 20 INJECTION, SOLUTION INTRAVENOUS at 15:46

## 2022-02-17 RX ADMIN — DEXTROSE MONOHYDRATE 1000 MG: 5 INJECTION INTRAVENOUS at 23:30

## 2022-02-17 RX ADMIN — BACLOFEN 10 MG: 10 TABLET ORAL at 21:31

## 2022-02-17 RX ADMIN — MORPHINE SULFATE 3 MG: 10 INJECTION, SOLUTION INTRAMUSCULAR; INTRAVENOUS at 18:22

## 2022-02-17 RX ADMIN — PROPOFOL 200 MG: 10 INJECTION, EMULSION INTRAVENOUS at 14:53

## 2022-02-17 ASSESSMENT — PULMONARY FUNCTION TESTS
PIF_VALUE: 23
PIF_VALUE: 1
PIF_VALUE: 22
PIF_VALUE: 23
PIF_VALUE: 25
PIF_VALUE: 23
PIF_VALUE: 24
PIF_VALUE: 23
PIF_VALUE: 24
PIF_VALUE: 24
PIF_VALUE: 21
PIF_VALUE: 23
PIF_VALUE: 25
PIF_VALUE: 25
PIF_VALUE: 23
PIF_VALUE: 23
PIF_VALUE: 21
PIF_VALUE: 2
PIF_VALUE: 24
PIF_VALUE: 23
PIF_VALUE: 25
PIF_VALUE: 23
PIF_VALUE: 24
PIF_VALUE: 22
PIF_VALUE: 24
PIF_VALUE: 24
PIF_VALUE: 22
PIF_VALUE: 22
PIF_VALUE: 24
PIF_VALUE: 23
PIF_VALUE: 4
PIF_VALUE: 23
PIF_VALUE: 23
PIF_VALUE: 25
PIF_VALUE: 23
PIF_VALUE: 24
PIF_VALUE: 23
PIF_VALUE: 24
PIF_VALUE: 22
PIF_VALUE: 25
PIF_VALUE: 23
PIF_VALUE: 23
PIF_VALUE: 22
PIF_VALUE: 27
PIF_VALUE: 25
PIF_VALUE: 2
PIF_VALUE: 23
PIF_VALUE: 25
PIF_VALUE: 24
PIF_VALUE: 23
PIF_VALUE: 24
PIF_VALUE: 23
PIF_VALUE: 24
PIF_VALUE: 22
PIF_VALUE: 23
PIF_VALUE: 22
PIF_VALUE: 25
PIF_VALUE: 24
PIF_VALUE: 23
PIF_VALUE: 8
PIF_VALUE: 23
PIF_VALUE: 22
PIF_VALUE: 24
PIF_VALUE: 24
PIF_VALUE: 23
PIF_VALUE: 23
PIF_VALUE: 1
PIF_VALUE: 23
PIF_VALUE: 25
PIF_VALUE: 23
PIF_VALUE: 16
PIF_VALUE: 23
PIF_VALUE: 24
PIF_VALUE: 24
PIF_VALUE: 23
PIF_VALUE: 1
PIF_VALUE: 23
PIF_VALUE: 25
PIF_VALUE: 24
PIF_VALUE: 23
PIF_VALUE: 23
PIF_VALUE: 22
PIF_VALUE: 23
PIF_VALUE: 23
PIF_VALUE: 24
PIF_VALUE: 23
PIF_VALUE: 23
PIF_VALUE: 24
PIF_VALUE: 23
PIF_VALUE: 0
PIF_VALUE: 24
PIF_VALUE: 22
PIF_VALUE: 22
PIF_VALUE: 26
PIF_VALUE: 2
PIF_VALUE: 24
PIF_VALUE: 23
PIF_VALUE: 25
PIF_VALUE: 23
PIF_VALUE: 25
PIF_VALUE: 23
PIF_VALUE: 22
PIF_VALUE: 23
PIF_VALUE: 24
PIF_VALUE: 5
PIF_VALUE: 23
PIF_VALUE: 24
PIF_VALUE: 22
PIF_VALUE: 23
PIF_VALUE: 23
PIF_VALUE: 22
PIF_VALUE: 23
PIF_VALUE: 1
PIF_VALUE: 23
PIF_VALUE: 24
PIF_VALUE: 23
PIF_VALUE: 2
PIF_VALUE: 23
PIF_VALUE: 23
PIF_VALUE: 22
PIF_VALUE: 23
PIF_VALUE: 27
PIF_VALUE: 24
PIF_VALUE: 22
PIF_VALUE: 23
PIF_VALUE: 22
PIF_VALUE: 24
PIF_VALUE: 23
PIF_VALUE: 22
PIF_VALUE: 23
PIF_VALUE: 23
PIF_VALUE: 24
PIF_VALUE: 21
PIF_VALUE: 21
PIF_VALUE: 23
PIF_VALUE: 23
PIF_VALUE: 1
PIF_VALUE: 23
PIF_VALUE: 2
PIF_VALUE: 0
PIF_VALUE: 24
PIF_VALUE: 24
PIF_VALUE: 2
PIF_VALUE: 23
PIF_VALUE: 22
PIF_VALUE: 23
PIF_VALUE: 0
PIF_VALUE: 3
PIF_VALUE: 24
PIF_VALUE: 23
PIF_VALUE: 22
PIF_VALUE: 1
PIF_VALUE: 23
PIF_VALUE: 24
PIF_VALUE: 22
PIF_VALUE: 24
PIF_VALUE: 5
PIF_VALUE: 22
PIF_VALUE: 23
PIF_VALUE: 24
PIF_VALUE: 24
PIF_VALUE: 23
PIF_VALUE: 24
PIF_VALUE: 26
PIF_VALUE: 23
PIF_VALUE: 22
PIF_VALUE: 23
PIF_VALUE: 23
PIF_VALUE: 22
PIF_VALUE: 25
PIF_VALUE: 22
PIF_VALUE: 2
PIF_VALUE: 24
PIF_VALUE: 1
PIF_VALUE: 23
PIF_VALUE: 2
PIF_VALUE: 22
PIF_VALUE: 3
PIF_VALUE: 3
PIF_VALUE: 24
PIF_VALUE: 23
PIF_VALUE: 24
PIF_VALUE: 24
PIF_VALUE: 23
PIF_VALUE: 24
PIF_VALUE: 23
PIF_VALUE: 22
PIF_VALUE: 23
PIF_VALUE: 15
PIF_VALUE: 23
PIF_VALUE: 24
PIF_VALUE: 24
PIF_VALUE: 23
PIF_VALUE: 24

## 2022-02-17 ASSESSMENT — PAIN SCALES - GENERAL
PAINLEVEL_OUTOF10: 7
PAINLEVEL_OUTOF10: 6
PAINLEVEL_OUTOF10: 7
PAINLEVEL_OUTOF10: 6
PAINLEVEL_OUTOF10: 6
PAINLEVEL_OUTOF10: 5

## 2022-02-17 ASSESSMENT — PAIN DESCRIPTION - PAIN TYPE
TYPE: SURGICAL PAIN

## 2022-02-17 ASSESSMENT — PAIN DESCRIPTION - LOCATION
LOCATION: ABDOMEN

## 2022-02-17 ASSESSMENT — PAIN DESCRIPTION - DESCRIPTORS
DESCRIPTORS: BURNING

## 2022-02-17 NOTE — OP NOTE
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 Post-Op Diagnosis: Same       Procedure(s):  ABDOMINAL LIPECTOMY SUCTION W  1500 placed  1300 Suctioned out  Scar revision of the abdomen measuring 65 cm with complex closure of the defect size    MONS LIFT    Surgeon(s):  Tracie Malcolm MD    Assistant:   First Assistant: Rubén Pascual RN    Anesthesia: General    Estimated Blood Loss (mL): 489     Complications: None    Specimens:   ID Type Source Tests Collected by Time Destination   A : ABDOMINAL FAT NECROSIS Tissue Tissue SURGICAL PATHOLOGY Tracie Malcolm MD 2/17/2022 1628        Implants:  * No implants in log *      Drains:   Closed/Suction Drain Left Abdomen Bulb 19 [place]ian (Active)   Dressing Status Clean;Dry 02/17/22 1812   Drainage Appearance Bloody 02/17/22 1812   Status To bulb suction 02/17/22 1812       [REMOVED] Closed/Suction Drain Right;Ventral Hip Bulb 19 [place]ian (Removed)       [REMOVED] Closed/Suction Drain Left;Ventral Hip Bulb 19 [place]ian (Removed)         INDICATION FOR PROCEDURE:  The patient is 48 y.o. female . All the risks, benefits, and alternative treatments were explained to the patient and an informed consent was obtained. No guarantees were made. The risk of both scar revision liposuction and dogear resection of mass left was discussed with patient in detail. All questions were answered. We also discussed the need for additional procedures. DESCRIPTION OF PROCEDURE:  The patient was marked in the holding area. The patient was brought into the room, was placed on the table in the supine position. SCDs were placed and turned on prior to induction of anesthesia via endotracheal patient.   Then all areas were prepped and draped in usual customary sterile manner. A timeout was performed. Everybody in the room was in agreement with the timeout. Then over her scar several stab wounds were made for the cannula. Then tumescent solution was infused in the right and the left abdomen in the right and left flank. A total of 700 was infused on the left side and 800 on the right side. Then using 3 and 4 caliber cannulize liposuction was performed in the upper abdomen lower abdomen and right and left flank. The dogear that was located on the patient's left side was unable to be suctioned adequately due to the scar tissue that was present. A total of 1300 cc was aspirated out. This was 700 the left and 600 on the right. After the liposuction was completed then the attention was turned to the mons pubis. An incision was made over the mons pubis over the previously marked area which was confirmed with the patient. Dissection was made through the skin and subcutaneous tissue. The excess tissue over the mons pubis was excised. In the central area where the mons pubis is located patient also had fat necrosis. The fat necrosis is at the abdominal side. Then attention was turned to the abdominal area of fat necrosis which was located in the central area over the mons pubis. Then using a #10 blade and cautery the area of fat necrosis was excised. After the excision was completed the area was inspected any bleeding point was in the foot was cauterized. Then the area was irrigated and reinspected. No other bleeding point was identified. There was also fat necrosis over the right side of the abdomen. These areas along the scar line were excised with the scar. There was also fat necrosis on the left side of the abdomen. This area along with the scar was excised. The total scar excision was on the abdomen only. After the abdominal scar excision was completed then the mons pubis was elevated. And was stapled into place.   Then there was another area of fat necrosis on the right side. An incision was made over that area and the fat necrosis was removed. And it was stapled in place. Then patient had scar asymmetry on the abdomen. The right side scar was elevated and the left side scar was lowered in the following manner. An incision was made over the right side. The excess skin and soft tissue was removed all the way down to the fascia. The area was irrigated. Any bleeding point was identified was cauterized. The area was inspected any bleeding post was not applied and was cauterized. Then attention was turned to the left abdomen scar. Due to the liposuction the area had deflated. So the scar could be lowered. There was the dogear on the patient's left side that was scarred in and could not be liposuctioned. At this point it was determined that the best course of action would be to excise as much of the dogear as possible. As I had discussed with the patient earlier that the dogear wraps around the back and we were unable to excise everything in 1 sitting and patient will need additional procedures. The excision was made through the skin and subcutaneous tissue to the fascia on the abdomen. The area was inspected. Any bleeding point was in the foot was cauterized. Then everything was stapled back into place. The patient was inspected. The scars appear to be more symmetrical upon inspection. Then the staples were removed. Thus extensive dissection was made superiorly medially and laterally and imperative to prevent undue tension onto the scar. After the resection of the mons was performed the mons pubis was sutured to the lower abdominal skin after excision of the abdominal scar. Then a #19 Alexi drain was placed. Then using 2-0 Vicryl in interrupted manner the deep tissue was closed. Then using 2-0 Quill the skin was closed in a subcuticular manner over the entire incision. Then Mastisol and Steri-Strips were placed.   Then ABDs and tape were

## 2022-02-17 NOTE — H&P
[address] [facility]LAST[facility] SURGERY  [address]  [phone]       History and Physical     No chief complaint on file. HPI:   Moshe Law is a 48 y.o. female who presents status post panniculectomy and abdominoplasty with dogears that cause irritation. Patient also has lipodystrophy of the mons pubis. She would like that addressed as well. Patient is here to discuss her surgical options regarding the dogears as well as the excess tissue on the mons and the lateral flanks    Medications:     Current Facility-Administered Medications   Medication Dose Route Frequency Provider Last Rate Last Admin    lidocaine PF 1 % injection 1 mL  1 mL IntraDERmal Once PRN Francesco Hicks MD        lactated ringers infusion   IntraVENous Continuous Francesco Hicks MD        sodium chloride flush 0.9 % injection 10 mL  10 mL IntraVENous 2 times per day Francesco Hicks MD        sodium chloride flush 0.9 % injection 10 mL  10 mL IntraVENous PRN Francesco Hicks MD        0.9 % sodium chloride infusion  25 mL IntraVENous PRN Francesco Hicks MD        sodium chloride flush 0.9 % injection 5-40 mL  5-40 mL IntraVENous 2 times per day Livia Manjarrez MD        sodium chloride flush 0.9 % injection 5-40 mL  5-40 mL IntraVENous PRN Livia Manjarrez MD        0.9 % sodium chloride infusion  25 mL IntraVENous PRN Livia Manjarrez MD        meperidine (DEMEROL) injection 12.5 mg  12.5 mg IntraVENous Q5 Min PRN Livia Manjarrez MD        morphine (PF) injection 1 mg  1 mg IntraVENous Q5 Min PRN Livia Manjarrez MD        HYDROmorphone (DILAUDID) injection 0.5 mg  0.5 mg IntraVENous Q5 Min PRN Livia Manjarrez MD        ondansetron [facility]) injection 4 mg  4 mg IntraVENous Once PRN Livia Manjarrez MD        diphenhydrAMINE (BENADRYL) injection 12.5 mg  12.5 mg IntraVENous Once PRN Livia Manjarrez MD          Allergies:      Allergies   Allergen Reactions    Celebrex [Celecoxib] Shortness Of Breath and Swelling    Pcn [Penicillins]      c/o yeast infection     Trazodone And Nefazodone      Suicidal thoughts     Review of Systems:   Constitutional: Negative for fever, chills, fatigue and unexpected weight change. HENT: Negative for hearing loss, sore throat and facial swelling. Eyes: Negative for pain and discharge. Respiratory: Negative for cough and shortness of breath. Patient has seasonal allergies. Cardiovascular: Patient has a history of hypertension. .   Gastrointestinal: Patient has history of GERD. Skin: Negative for pallor and rash. Neurological: Patient has a history of Bell's palsy. Hematological: Patient has iron deficiency anemia. Psychiatric/Behavioral: Negative for behavioral problems. Patient has bipolar disorder. Patient has posttraumatic stress syndrome.     Past Medical History:   Diagnosis Date    Bell's palsy     Bipolar 1 disorder, depressed ([facility] [address] 2018    GERD (gastroesophageal reflux disease)     HTN (hypertension)     Iron (Fe) deficiency anemia     Obesity     Post traumatic stress disorder     Post-nasal drip     Seasonal allergies      Past Surgical History:   Procedure Laterality Date    BREAST REDUCTION SURGERY       SECTION      Twice    LIPECTOMY N/A 10/19/2021    PANNICULECTOMY/ABDOMINOPLASTY, TAP BLOCK, INCISIONAL WOUND VAC, BIOPATCH performed by Omar Rodriguez MD at [place] [address] History     Socioeconomic History    Marital status:      Spouse name: Not on file    Number of children: Not on file    Years of education: Not on file    Highest education level: Not on file   Occupational History    Not on file   Tobacco Use    Smoking status: Former Smoker     Quit date: 1995     Years since quittin.1    Smokeless tobacco: Never Used   Vaping Use    Vaping Use: Never used   Substance and Sexual Activity    Alcohol use: Yes     Comment: occational wine    Drug use: No    Sexual activity: Yes     Partners: reviewed. Constitutional: Oriented to person, place, and time. Appears well-developed and well-nourished. No distress. Head: Normocephalic and atraumatic. Eyes: Conjunctivae and EOM are normal.   Pulmonary/Chest: Effort normal. No respiratory distress. Neurological: Alert and oriented to person, place, and time. Skin: Patient has dogears more on the left than right. Patient has lipodystrophy of the mons pubis with ptosis of the mons pubis. Also has scar asymmetry  Psychiatric: Normal mood and affect. Behavior is normal    Imaging:   @[address]@        Impression/Plan:      Diagnosis Orders   1. Scars     2. Lipodystrophy       Patient Active Problem List   Diagnosis    Seasonal allergies    HTN (hypertension)    Iron (Fe) deficiency anemia    Obesity    GERD (gastroesophageal reflux disease)    Bell's palsy    Nasal congestion    Raynaud's phenomenon without gangrene    History of low transverse  section    Bipolar 1 disorder, depressed ([address]    Post traumatic stress disorder    Primary insomnia    Excess skin of abdomen    Major depressive disorder, recurrent episode, mild (HCC)    Chronic pain of right knee    Right hip pain    Neck pain on right side    History of sinus surgery    Episodic overuse of medication    S/P bilateral breast reduction    Rectus diastasis    Symptomatic abdominal panniculus     Plan:  We discussed scar revision with excision and possible liposuction of the dogears. We discussed months left. We discussed that the scars may continue to be symmetrical as discussed with the patient previously the body is not 100% symmetrical and it is likely not possible possible to obtain 100% symmetry. We discussed infection bleeding wound healing issues. We discussed that the scars will probably be more circumferential at this point if we have to continue chasing the dogears.   We discussed the different ways that are involved she has multiple moles need to weight loss. We also discussed need for more revisions. No guarantees were stated or implied. We also discussed the risk of mons pubis lift . We discussed lymphedema of the mons pubis. We discussed asymmetry of the mons pubis. We discussed all the risk of liposuction. ASPS consent mons pubis lift    GENERAL INFORMATION  Mons pubis lift is a surgical procedure to remove excess skin and fatty tissue from the mons pubis. Obese individuals who intend to lose weight should postpone all forms of body contouring surgery until they have reached a stable weight. ALTERNATIVE TREATMENTS  Alternative forms of management consist of not treating the areas of loose skin and fatty deposits. Liposuction may be a surgical alternative to if there is good skin tone and localized mons pubis fatty tissue. If you have lost your skin laxity as apparent by multiple stretch marks you will not be a good liposuction candidate. Diet and exercise programs may be of benefit in the overall reduction of excess body fat and contour improvement. Risks and potential complications are associated with alternative surgical forms of treatment. RISKS OF mons pubis lift SURGERY  Every surgical procedure involves a certain amount of risk and it is important that you understand these risks and the possible complications associated with them. In addition, every procedure has limitations. An individual's choice to undergo a surgical procedure is based on the comparison of the risk to potential benefit. Although the majority of patients do not experience these complications, you should discuss each of them with your plastic surgeon to make sure you completely understand all possible consequences of an abdominoplasty/panniculectomy. Bleeding- It is possible, though unusual, to experience a bleeding episode during or after surgery.      Should post-operative bleeding occur, it may require an emergency treatment to drain the accumulated blood or blood transfusion. Intra-operative blood transfusions may be required. Do not take any aspirin or anti-inflammatory medications for ten days before surgery, as this may increase the risk of bleeding. Non-prescription herbs and dietary supplements can increase the risk of surgical bleeding. Hematoma can occur at any time following injury. If blood transfusions are needed to treat blood loss, there is a risk of blood related infections such as hepatitis and the HIV (AIDS). Heparin medications that are used to prevent blood clots in veins can produce bleeding and decreased blood platelets. Infection - Infection is can occur after this surgery. Should an infection occur, treatment including antibiotics, hospitalization, or additional surgery may be necessary. There is a greater risk of infection when body contouring procedures are performed in conjunction with abdominal surgical procedures. Change in Skin Sensation- It is common to experience diminished (or loss) of skin sensation in areas that have had surgery. Diminished (or complete loss of skin sensation) may not totally resolve after an abdominoplasty/pannicular     Skin Contour Irregularities- Contour and shape irregularities and depressions may occur after mons pubis lift. Visible and palpable wrinkling of skin can occur. Residual skin irregularities at the ends of the incisions or dog ears are always a possibility as is skin pleating when there is excessive redundant skin. This may improve with time, or it can be surgically corrected. Major Wound Separation- Wounds may separate after surgery. Should this occur, additional treatment including surgery may be necessary. Skin Discoloration / Swelling- Bruising and swelling normally occurs following mons lift. The skin in or near the surgical site can appear either lighter or darker than surrounding skin.   Although uncommon, swelling and skin discoloration may persist for long periods of time and, in rare situations, may be permanent. Skin Sensitivity- Itching, tenderness, or exaggerated responses to hot or cold temperatures may occur after surgery. Usually this resolve during healing, but in some situations it may be chronic. Sutures- Most surgical techniques use deep sutures. You may notice these sutures after your surgery. Sutures may spontaneously poke through the skin, become visible or produce irritation that requires removal.    Damage to Deeper Structures- There is the potential for injury to deeper structures including nerves, blood vessels, muscles, and lungs (pneumothorax), or intra-abdominal injury can occure during any surgical procedure. The potential for this to occur varies according to the type of procedure being performed. Injury to deeper structures may be temporary or permanent. Fat Necrosis- Fatty tissue found deep in the skin might die. This may produce areas of firmness within the skin. Additional surgery to remove areas of fat necrosis may be necessary. There is the possibility of contour irregularities in the skin that may result from fat necrosis. Obesity: If you're BMI is greater than 30 you may have a higher chance of complications. This may include but not limited to wound healing and infections. Also, if you have other medical problems such as diabetes and hypertension it may affect your healing as well as your surgical results in bleeding. Pubic Distortion- There will be distortion of their labia and pubic area. Additional treatment including surgery may be necessary. Even with additional surgery she may never have symmetry. At times you may have painful intercourse. Scarring- All surgery leaves scars, some more visible than others. This surgery will leave large scars. Abnormal scars may occur within the skin and deeper tissues depending on your healing.   Scars may be unattractive and of different color than surrounding skin. Scar appearance may also vary within the same scar, exhibit contour variations or \"bunching\" due to the amount of excess skin. Scars may be asymmetrical (appear different between right and left side of the body). There is the possibility of visible marks in the skin from sutures. In some cases, scars may require surgical revision or treatment. Surgical Anesthesia- Both local and general anesthesia involve risk. There is the possibility of complications, injury, and even death from all forms of surgical anesthesia or sedation    Asymmetry- Symmetrical body appearance may not result from mons pubis lift factors such as skin tone, fatty deposits, skeletal prominence, and muscle tone may contribute to normal asymmetry in body features. Additional surgery may be necessary to attempt to attempt to improve asymmetry. Allergic Reactions- In some cases, local allergies to tape, suture material and glues, blood products, topical preparations or injected agents have been reported. Serious systemic reactions including shock (anaphylaxis) may occur to drugs used during surgery and prescription medications. Allergic reactions may require additional treatment. Delayed Healing- Wound disruption or delayed wound healing is possible. Some areas of the mons pubis may not heal normally and may take a long time to heal.  Some areas of skin or tissue may die. This may require frequent dressing changes or further surgery to remove the non-healed tissue. Smokers have a greater risk of skin loss and wound healing complications. Also, patient with certain systemic disease or taking certain medications are at increased risk. Also, infections under the pannus may affect your healing. Seroma- Fluid accumulations infrequently occur in between the skin and the mons pubis. This may require additional procedures for drainage of fluid.       Shock- In rare circumstances, your surgical procedure can cause severe trauma, particularly when multiple or extensive procedures are performed. Although serious complications are infrequent, infections or excessive fluid loss can lead to severe illness and even death. If surgical shock occurs, hospitalization and additional treatment would be necessary. Surgical Wetting Solutions-There is the possibility that large volumes of fluid containing dilute local anesthetic drugs and epinephrine that is injected into fatty deposits during surgery may contribute to fluid overload or systemic reaction to these medications. Additional treatment including hospitalization may be necessary. Persistent Swelling (Lymphedema)- Persistent swelling in the legs can occur following mons pubis lift    Pain- You will experience pain after your surgery. Pain of varying intensity and duration may occur and persist after panniculectomy. Chronic pain may occur every from nerves becoming trapped in scar tissue after panniculectomy. There may be numbness that can occur after a panniculectomy this could be temporary or permanent    Unsatisfactory Result- There is no guarantee or warranty expressed or implied, on the results that may be obtained. You may be disappointed with the results of panniculectomy surgery. This would include risks such as asymmetry, unsatisfactory or highly visible surgical scar location, unacceptable visible deformities, bunching and rippling in the skin near the suture lines or at the ends of the incisions (dog ears), poor healing, wound disruption, and loss of sensation. It may not be possible to correct or improve the effects of surgical scars. In some situations, it may not be possible to achieve optimal results with a single surgical procedure. Additional surgery may be required to improve results.     Deep Venous Thrombosis, Cardiac and Pulmonary Complications- Surgery, especially longer procedures, may be associated with the formation of, or increase in, blood clots in the venous system. Pulmonary complications may occur secondarily to both blood clots (pulmonary emboli), fat deposits (fat emboli) or partial collapse of the lungs after general anesthesia. Pulmonary and fat emboli can be life-threatening or fatal in some circumstances. Air travel, inactivity and other conditions may increase the incidence of blood clots traveling to the lungs causing a major blood clot that may result in death. It is important to discuss with your physician any past history of blood clots, swollen legs or the use of estrogen or birth control pills that may contribute to this condition. Cardiac complications are a risk with any surgery and anesthesia, even in patients without symptoms. Should any of these complications occur, you may require hospitalization and additional treatment. If you experience shortness of breath, chest pains, or unusual heart beats, seek medical attention immediately. ADDITIONAL ADVISORIES    Long-Term Results- Subsequent alterations in the appearance of your body may occur as the result of aging, sun exposure, weight loss, weight gain, pregnancy, menopause or other circumstances not related to your surgery. Metabolic Status of Massive Weight Loss Patients- Your personal metabolic status of blood chemistry and protein levels may be abnormal following massive weight loss and surgical procedures to make a patient lose weight. Individuals with abnormalities may be a risk for serious medical and surgical complications, including delayed wound healing, infection or even in rare cases, death. Body-Piercing Procedures- Individuals who currently wear body-piercing jewelry or are seeking to undergo body-piercing procedures must consider the possibility that an infection could develop anytime following this procedure. Treatment including antibiotics, hospitalization or additional surgery may be necessary.      Intimate Relations After Surgery- Surgery involves coagulating of blood vessels and increased activity of any kind may open these vessels leading to a bleed, or hematoma. Increased activity that increased your pulse or heart rate may cause additional bruising, swelling, and the need for return to surgery and control bleeding. It is wise to refrain from sexual activity until your physician states it is safe. Medications- There are many adverse reactions that occur as the result of taking over the counter, herbal, and/or prescription medications. Be sure to check with your physician about any drug interactions that may exist with medications that you are already taking. If you have an adverse reaction, stop the drugs immediately and call your plastic surgeon for further instructions. If the reaction is severe, go immediately to the nearest emergency room. When taking the prescribed pain medications after surgery, realize that they can affect your thought process and coordination. Do not drive, do not operate complex equipment, do not make any important decisions and do not drink any alcohol while taking these medications. Be sure to take your prescribed medication only as directed. If at blood thinners such as aspirin and Coumadin or Plavix etc. Is prescribed by a physician you must consult with the prescribing physician prior to stopping any of the blood thinners. Our focus is improvement rather than perfection. Complications or less than satisfactory results are sometimes unavoidable, may require additional surgery and often are stressful. Smoking, Second-Hand Smoke Exposure, Nicotine Products (Patch, Gum, Nasal Spray)-   Patients who are currently smoking, use tobacco products, or nicotine products (patch, gum, or nasal spray) are at a greater risk for significant surgical complications of skin dying, delayed healing, and additional scarring.   Individuals exposed to second-hand smoke are also at potential risk for similar complications attributable to nicotine exposure. Additionally, smokers may have a significant negative effect on anesthesia and recovery from anesthesia, with coughing and possibly increased bleeding. Individuals who are not exposed to tobacco smoke or nicotine-containing products have a significantly lower risk of this type of complication. It is important to refrain from smoking at least 6-8 weeks before surgery and I do not smoke for 8 weeks to 3 months after surgery. Post-bariatric patients: It is imperative that quit smoking at least 6-8 weeks before undergoing this procedure as it will adversely affect your outcome. ADDITIONAL SURGERY NECESSARY (RE-OPERATIONS)  There are many variable conditions that may influence the long-term result of surgery. Should complications occur, additional surgery or other treatments may be necessary. Secondary surgery may be necessary to obtain optimal results. Risks and complications can occur with any surgery, the risks cited are particularly associated with abdominoplasty/panniculectomy. Other complications and risks can occur but are even more uncommon. The practice of medicine and surgery is not an exact science. There is no guarantee or warranty expressed or implied, on the results that may be obtained. In some situations, it may not be possible to achieve optimal results with a single surgical procedure or even a multiple procedure. PATIENT COMPLIANCE   Follow all physician instructions carefully; this is essential for the success of your outcome. It is important that the surgical incisions are not subjected to excessive force, swelling, abrasion, or motion during the time of healing. Personal and vocational activity needs to be restricted. Protective dressings and drains should not be removed unless instructed by me. Successful post-operative function depends on both surgery and subsequent care.   Physical activity that increases your pulse or heart rate may cause bruising, swelling, fluid accumulation and the need for return to surgery. It is wise to refrain from intimate physical activities after surgery until your physician states it is safe. It is important that you participate in follow-up care, return for aftercare, and promote your recovery after surgery. FINANCIAL RESPONSIBILITIES  The cost of surgery involves several charges for the services provided. The total includes fees charged by your surgeon, the cost of surgical supplies, anesthesia, laboratory tests, and possible hospital charges, depending on where the surgery is performed. Depending on whether the cost of surgery is covered by an insurance plan, you will be responsible for necessary co-payments, deductibles, and charges not covered. The fees charged for this procedure do not include any potential future costs for additional procedures that you elect to have or require in order to revise, optimize, or complete your outcome. Additional costs may occur should complications develop from the surgery. Secondary surgery or hospital day-surgery charges involved with revision surgery will also be your responsibility. HEALTH INSURANCE  Most health insurance companies exclude coverage for cosmetic surgical operations any complications that might occur from surgery. Please carefully review your health insurance subscriber-information pamphlet or contact your insurance company for a detailed explanation of their policies for covering abdominoplasty/panniculectomy procedures. Most insurance plans may exclude coverage for secondary or revisionary surgery. The following information was discussed with the patient, but this is not an all-inclusive-other issue were also discussed with patient. GENERAL INFORMATION  The surgical treatment of scars is a procedure frequently performed by plastic surgeons. Scars are the unavoidable result of injuries, disease, or surgery.   It is impossible to totally remove the presence of a scar, yet plastic surgery may improve the appearance and texture of scars. There are many different techniques of scar revision surgery. Other treatments including physical or hand therapy may be needed in addition to surgery. ALTERNATIVE TREATMENTS  Alternative forms of treatment consist of not treating the scar condition, injections of cortisone type drugs into the scar, or the use of special compressive garments/devices worn over the scar. Dermabrasion, laser treatments and other surgical techniques may be used to revise scars. Risks and potential complications are associated with alternative forms of treatment. RISKS OF SCAR REVISION SURGERY  Every surgical procedure involves a certain amount of risk and it is important that you understand these risks and the possible complications associated with them. In addition, every procedure has limitations. An individual's choice to undergo a surgical procedure is based on the comparison of the risk to potential benefit. Although some of the patients do not experience these complications, you should an understanding of these possible complications. Bleeding- It is possible, to experience a bleeding episode during or after surgery. Intraoperative blood transfusions may be required. Should post-operative bleeding occur, it may require an emergency treatment to drain the accumulated blood or blood transfusion. Do not take any aspirin or anti-inflammatory medications for ten days before or after surgery, as this may increase the risk of bleeding. Non-prescription herbs and dietary supplements can increase the risk of surgical bleeding. Hematoma can occur at any time following injury. If blood transfusions are necessary to treat blood loss, there is the risk of blood-related infections such as hepatitis and HIV (AIDS).   Heparin medications that are used to prevent blood clots in veins can produce bleeding and decreased blood platelets. Infection- Infection is unusual after surgery. Should an infection occur, additional treatment including antibiotics, hospitalization, or additional surgery may be necessary. Scarring- All surgery leaves scars, some more visible than others. Although good wound healing after a surgical procedure is expected, abnormal scars may occur within the skin and deeper tissues. Scars may be unattractive and of different color than the surrounding skin tone. Scar appearance may also vary within the same scar. Scars may be asymmetrical (appear different on the right and left side of the body). There is the possibility of visible marks in the skin from sutures. In some cases, scars may require surgical revision or treatment. Skin Discoloration / Swelling- Some bruising and swelling normally occurs following surgery. The skin in or near the surgical site can appear either lighter or darker than surrounding skin. Although uncommon, swelling and skin discoloration may persist for long periods of time and, in rare situations, may be permanent. Skin Sensitivity- Itching, tenderness, or exaggerated responses to hot or cold temperatures may occur after surgery. Usually this resolves during healing, but in some situations,  it may be chronic or permanent. Pain- You will experience pain after your surgery. Pain of varying intensity and duration may occur and persist after surgery. Chronic pain may occur from nerves becoming trapped in scar tissue. Damage to Deeper Structures- There is the potential for injury to deeper structures including nerves, blood vessels, muscles, and lungs (pneumothorax) during any surgical procedure. The potential for this to occur varies according to where on the body surgery is being performed. Injury to deeper structures may be temporary or permanent. Obesity: If you're BMI is greater than 30 you may have a higher chance of complications.   This may include but not limited to wound healing and infections. Also, if you have other medical problems such as diabetes and hypertension it may affect your healing as well as your surgical results in bleeding. Wound Disruption- Until wound healing is complete, it is possible to split open the surgical wound where the scar revision was performed. Wound disruption can produce a poor surgical result. If this occurs, additional treatment may be necessary. Sutures- Some surgical techniques use deep sutures. You may notice these sutures after your surgery. Sutures may spontaneously poke through the skin, become visible or produce irritation that requires removal.    Skin Contour Irregularities- Contour irregularities and depressions may occur after scar revision surgery. Visible and palpable wrinkling of skin can occur. Residual skin irregularities at the ends of the incisions or dog ears are always a possibility and may require additional surgery. This may improve with time, or it can be surgically corrected. Allergic Reactions- In some cases, local allergies to tape, suture materials and glues, blood products, topical preparations or injected agents have been reported. Serious systemic reactions including shock (anaphylaxis) may occur to drugs used during surgery and prescription medications. Allergic reactions may require additional treatment. Surgical Anesthesia- Both local and general anesthesia involve risk. There is the possibility of complications, injury, and even death from all forms of surgical anesthesia or sedation. Delayed Healing- Wound disruption or delayed wound healing is possible. Some areas of the skin may not heal normally and may take a long time to heal.  Some areas of skin may die, requiring frequent dressing changes or further surgery to remove the non-healed tissue. Smokers have a greater risk of skin loss and wound healing complications.     Change in Skin Sensation- It is common to experience diminished (or loss) of skin sensation in areas that have had surgery. Diminished (or complete loss of skin sensation) may not totally resolve. Shock- In rare circumstances, your surgical procedure can cause severe trauma, particularly when multiple or extensive procedures are performed. Although serious complications are infrequent, infections or excessive fluid loss can lead to severe illness and even death. If surgical shock occurs, hospitalization and additional treatment would be necessary. Unsatisfactory Result- There is no guarantee or warranty expressed or implied, on the results that may be obtained. You may be disappointed with the results of scar revision surgery. This would include risks such as asymmetry, unacceptable visible deformities at the ends of the incisions (dog ears), loss of function, poor healing, wound disruption, skin death and loss of sensation. It may be necessary to perform additional surgery to improve your results. Cardiac and Pulmonary Complications- Surgery, especially longer procedures, may be associated with the formation of, or increase in, blood clots in the venous system. Pulmonary complications may occur secondarily to both blood clots (pulmonary emboli), fat deposits (fat emboli) or partial collapse of the lungs after general anesthesia. Pulmonary and fat emboli can be life-threatening or fatal in some circumstances. Air travel, inactivity and other conditions may increase the incidence of blood clots traveling to the lungs causing a major blood clot that may result in death. It is important to discuss with your physician any past history of blood clots or swollen legs that may contribute to this condition. Cardiac complications are a risk with any surgery and anesthesia, even in patients without symptoms. If you experience shortness of breath, chest pains, or unusual heart beats, seek medical attention immediately.   Should any of these complications occur, you may require hospitalization and additional treatment. Skin Disorders / Skin Cancer:  Skin disorders and skin cancer may occur independently of scar revision surgery. Long-Term Results- Subsequent alterations in scar appearance may occur as the result of aging, weight loss or gain, sun exposure, pregnancy, menopause, or other circumstances not related to scar revision surgery. Smoking, Second-Hand Smoke Exposure, Nicotine Products (Patch, Gum, Nasal Spray)-   Patients who are currently smoking, use tobacco products, or nicotine products (patch, gum, or nasal spray) are at a greater risk for significant surgical complications of skin dying, delayed healing, and additional scarring. Individuals exposed to second-hand smoke are also at potential risk for similar complications attributable to nicotine exposure. Additionally, smoking may have a significant negative effect on anesthesia and recovery from anesthesia, with coughing and possibly increased bleeding. Individuals who are not exposed to tobacco smoke or nicotine-containing products have a significantly lower risk of this type of complication. This also may apply to secondhand smoke as well. Mental Health Disorders and Elective Surgery- It is important that all patients seeking to undergo elective surgery have realistic expectations that focus on improvement rather than perfection. Complications or less than satisfactory results are sometimes unavoidable, may require additional surgery and often are stressful. Please openly discuss with your surgeon, prior to surgery, any history that you may have of significant emotional depression or mental health disorders. Although many individuals may benefit psychologically from the results of elective surgery, effects on mental health cannot be accurately predicted.     Medications- There are many adverse reactions that occur as the result of taking over the counter, herbal, and/or prescription medications. Be sure to check with your physician about any drug interactions that may exist with medications which you are already taking. If you have an adverse reaction, stop the drugs immediately and call your plastic surgeon for further instructions. If the reaction is severe, go immediately to the nearest emergency room. When taking the prescribed pain medications after surgery, realize that they can affect your thought process and coordination. Do not drive, do not operate complex equipment, do not make any important decisions, and do not drink any alcohol while taking these medications. Be sure to take your prescribed medication only as directed. If you are on blood thinners such as aspirin, Coumadin, and Plavix etc. you must check with the physician who prescribed him to you prior to stopping them. ADDITIONAL SURGERY NECESSARY  In some situations, it may not be possible to achieve optimal revision of scarring with a single surgical procedure. Multiple procedures may be necessary. Should complications occur, additional surgery or other treatments may be necessary. Even though risks and complications occur infrequently, the risks cited are the ones that are particularly associated with scar revision surgery. Other complications and risks can occur but are even more uncommon. The practice of medicine and surgery is not an exact science. Although good results are expected, there cannot be any guarantee or warranty expressed or implied on the results that may be obtained. PATIENT COMPLIANCE   Follow all physician instructions carefully; this is essential for the success of your outcome. Patient compliance with post-operative activity restriction is critical.  It is important that the surgical incisions are not subjected to excessive force, swelling, abrasion, or motion during the time of healing.   Personal and vocational activities that involve the potential for re-injury to the scar revision must be avoided until healing is completed. Protective dressings and drains should not be removed unless instructed by your plastic surgeon. Successful post-operative function depends on both surgery and subsequent care. Physical activity that increases your pulse or heart rate may cause bruising, swelling, fluid accumulation and the need for return to surgery. It is wise to refrain from intimate physical activities after surgery until your physician states it is safe. It is important that you participate in follow-up care, return for aftercare, and promote your recovery after surgery. HEALTH INSURANCE  Most health insurance companies exclude coverage for cosmetic surgical operations or any complications that might occur from surgery. Please carefully review your health insurance subscriber information pamphlet. Most insurance plans exclude coverage for secondary or revisionary surgery. The cost of surgery involves several charges for the services provided. The total includes fees charged by your surgeon, the cost of surgical supplies, anesthesia, laboratory tests, and possible hospital charges, depending on where the surgery is performed. Depending on whether the cost of surgery is covered by an insurance plan, you will be responsible for necessary co-payments, deductibles, and charges not covered. The fees charged for this procedure do not include any potential future costs for additional procedures that you elect to have or require in order to revise, optimize, or complete your outcome. Additional costs may occur should complications develop from the surgery. Secondary surgery or hospital day-surgery charges involved with revision surgery will also be your responsibility. I also informed the patient that photographs may be taken.   These images or illustration along with my medical records created in my case may be used for obtaining insurance approval, for use in examination, may assist in education, testing, credentialing and or certifying by [facility]. These images and records may be used to communicate with referring physician. Tito Reich GENERAL INFORMATION  Liposuction is a surgical technique to remove unwanted deposits of fat from specific areas of the body, including the face and neck, upper arms, trunk, abdomen, buttocks, hips and thighs, and the knees, calves and ankles. This is not a substitute for weight reduction, but a method for removing localized deposits of fatty tissue that do not respond to diet or exercise. Liposuction may be performed as a primary procedure for body contouring or combined with other surgical techniques such as facelift, abdominoplasty, or thigh lift procedures to tighten loose skin and supporting structures. The best candidates for liposuction are individuals of relatively normal weight who have excess fat in particular body areas. Having firm, elastic skin will result in a better final contour after liposuction. Skin that has diminished tone due to stretch marks, weight loss, or natural aging will not reshape itself to the new contours and may require additional surgical techniques to remove and tighten excess skin. Body-contour irregularities due to structures other than fat cannot be improved by this technique. Liposuction by itself will not improve areas of dimpled skin known as cellulite.       Suction-assisted lipectomy surgery is performed by using a hollow metal surgical instrument known as a cannula that is inserted through small skin incision(s) and is passed back and forth through the area of fatty deposit. The cannula is attached to a vacuum source, which provides the suction needed to remove the fatty tissue. There are a variety of different techniques used by plastic surgeons for liposuction and care following surgery. Liposuction may be performed under local or general anesthesia. Tumescent liposuction technique involves the infiltration of fluid containing dilute local anesthetic and epinephrine into areas of fatty deposits. This technique can reduce discomfort at the time of surgery, blood loss, and post-operative bruising. Support garments and dressings are worn to control swelling and promote healing. Your surgeon may recommend that you make arrangements to donate a unit of your own blood that would be used if a blood transfusion were necessary after surgery. ALTERNATIVE TREATMENTS  Alternative forms of management consist of not treating the areas of fatty deposits. Diet and exercise regimens may be of benefit in the overall reduction of excess body fat. Direct removal of excess skin and fatty tissue may be necessary in addition to liposuction in some patients. Risks and potential complications are associated with alternative surgical forms of treatment. RISKS OF LIPOSUCTION SURGERY  Every surgical procedure involves a certain amount of risk and it is important that you understand these risks and the possible complications associated with them. In addition, every procedure has limitations. An individual's choice to undergo a surgical procedure is based on the comparison of the risk to potential benefit. Although the majority of patients do not experience these complications, you should discuss each of them with your plastic surgeon to make sure you completely understand all possible consequences of liposuction. Patient Selection- Individuals with poor skin tone, medical problems, obesity, or unrealistic expectations may not be candidates for liposuction. Bleeding- It is possible, though unusual, to experience a bleeding episode during or after surgery. Should post-operative bleeding occur, it may require an emergency treatment to drain the accumulated blood or blood transfusion. Intra-operative blood transfusions may be required.   Hematoma can occur at any time following injury and may contribute to infection or other problems. Heparin medications that are used to prevent blood clots in veins can produce bleeding and decreased blood platelets. Do not take any aspirin or anti-inflammatory medications for ten days before or after surgery, as this may increase the risk of bleeding. Non-prescription herbs and dietary supplements can increase the risk of surgical bleeding. If blood transfusions are needed to treat blood loss, there is a risk of blood-related infections such as hepatitis and HIV (AIDS). Heparin medications that are used to prevent blood clots in veins can produce bleeding and decreased blood platelets. If you are on any blood thinners, he would need to check with your prescribing physician to determine if you are allowed to discontinue down prior to surgery. Infection- Infection is unusual after surgery. Should an infection occur, additional treatment including antibiotics, hospitalization, or additional surgery may be necessary. In extremely rare instances, life-threatening infections, including toxic shock syndrome have been noted after liposuction surgery. Scarring- All surgery leaves scars, some more visible than others. Although good wound healing after a surgical procedure is expected, abnormal scars may occur within the skin and deeper tissues. Scars may be unattractive and of different color than surrounding skin. Scar appearance may also vary within the same scar, exhibit contour variations and \"bunching\" due to the amount of excess skin. Scars may be asymmetrical (appear different between right and left side of the body). There is the possibility of visible marks in the skin from sutures. In some cases, scars may require surgical revision or treatment. Change in Skin Sensation- It is common to experience diminished (or loss) of skin sensation in areas that have had surgery. This usually resolves over a period of time. Diminished (or complete loss of skin sensation) infrequently occurs and may not totally resolve. Skin Discoloration / Swelling- Bruising and swelling normally occurs following liposuction. The skin in or near the surgical site can appear either lighter or darker than surrounding skin. Although uncommon, swelling and skin discoloration may persist for long periods of time and, in rare situations, may be permanent. Skin Contour Irregularities- Contour and shape irregularities and depressions may occur after liposuction. Visible and palpable wrinkling of skin can occur. Residual skin irregularities at the ends of the incisions or dog ears are always a possibility as is skin pleating when there is excessive redundant skin. This may improve with time, or it can be surgically corrected. Asymmetry- Symmetrical body appearance may not result from liposuction surgery. Factors such as skin tone, fatty deposits, skeletal prominence, and muscle tone may contribute to normal asymmetry in body features. Additional surgery may be necessary to attempt to improve asymmetry. Seroma- Fluid accumulations infrequently occur in areas where liposuction has been performed. Additional treatments or surgery to drain accumulations of fluid may be necessary. Surgical Anesthesia- Both local and general anesthesia involve risk. There is the possibility of complications, injury, and even death from all forms of surgical anesthesia or sedation. Pain- You will experience pain after your surgery. Pain of varying intensity and duration may occur and persist after liposuction surgery. Chronic pain may occur very infrequently from nerves becoming trapped in scar tissue. Skin Sensitivity- Itching, tenderness, or exaggerated responses to hot or cold temperatures may occur after surgery. Usually this resolve during healing, but in rare situations it may be chronic.     Damage to Deeper Structures- There is the potential for injury to deeper structures including nerves, blood vessels, muscles, and lungs (pneumothorax) during any surgical procedure. The potential for this to occur varies according to the type of procedure being performed. Injury to deeper structures may be temporary or permanent. Delayed Healing- Wound disruption or delayed wound healing is possible. Some areas may not heal normally and may take a long time to heal.  Some areas of skin may die. This may require frequent dressing changes or further surgery to remove the non-healed tissue. Smokers have a greater risk of skin loss and wound healing complications. Allergic Reactions- In rare cases, local allergies to tape, suture material and glues, blood products, topical preparations or injected agents have been reported. Serious systemic reactions including shock (anaphylaxis) may occur to drugs used during surgery and prescription medications. Allergic reactions may require additional treatment. Fat Necrosis- Fatty tissue found deep in the skin might die. This may produce areas of firmness within the skin. Additional surgery to remove areas of fat necrosis may be necessary. There is the possibility of contour irregularities in the skin that may result from fat necrosis. Pubic Distortion- It is possible, though unusual, for women to develop distortion of their labia and pubic area. Should this occur, additional treatment including surgery may be necessary. Umbilicus- Malposition, scarring, unacceptable appearance or loss of the umbilicus (navel) may occur. Persistent Swelling (Lymphedema)- Persistent swelling in the legs can occur following liposuction. Surgical Shock- In rare circumstances, liposuction can cause severe trauma, particularly when multiple or extensive areas are suctioned at one time. Although serious complications are infrequent, infections or excessive fluid/blood loss can lead to severe illness and even death.   If surgical shock occurs after liposuction, hospitalization and additional treatment would be necessary. Individuals undergoing liposuction procedures where a large volume of fat is removed are at greater risk of complications. Patients contemplating large volume liposuction, greater than 5000 cc's, may be advised to have postoperative monitoring and aftercare that involves overnight hospitalization. Deep Venous Thrombosis, Cardiac and Pulmonary Complications- Surgery, especially longer procedures, may be associated with the formation of, or increase in, blood clots in the venous system. Fat embolism syndrome occurs when fat droplets are trapped in the lungs. This is a very rare and possibly fatal complication of suction assisted Lipectomy. Pulmonary complications may occur secondarily to both blood clots (pulmonary emboli), fat deposits (fat emboli) or partial collapse of the lungs after general anesthesia. Pulmonary and fat emboli can be life-threatening or fatal in some circumstances. Inactivity and other conditions may increase the incidence of blood clots traveling to the lungs causing a major blood clot that may result in death. It is important to discuss with your physician any past history of blood clots, swollen legs or the use of estrogen or birth control pills that may contribute to this condition. Cardiac complications are a risk with any surgery and anesthesia, even in patients without symptoms. If you experience shortness of breath, chest pains, or unusual heart beats, seek medical attention immediately. Should any of these complications occur, you may require hospitalization and additional treatment. Tumescent Liposuction-There is the possibility that large volumes of fluid containing dilute local anesthetic drugs and epinephrine that is injected into fatty deposits during surgery may contribute to fluid overload or systemic reaction to these medications.   Additional treatment including hospitalization may be necessary. Unsatisfactory Result- Although good results are expected, there is no guarantee or warranty expressed or implied, on the results that may be obtained. You may be disappointed with the results of liposuction surgery. This would include risks such as asymmetry, unsatisfactory or highly visible surgical scar location, unacceptable visible deformities, bunching and rippling in the skin near the suture lines or at the ends of the incisions (dog ears), poor healing, wound disruption, and loss of sensation. It may not be possible to correct or improve the effects of surgical scars. Additional surgery may be required to attempt to improve results. ADDITIONAL ADVISORIES    Metabolic Status of Massive Weight Loss Patients- Your personal metabolic status of blood chemistry and protein levels may be abnormal following massive weight loss and surgical procedures to make a patient lose weight. Individuals with abnormalities may be at risk for serious medical and surgical complications, including delayed wound healing, infection or even in rare cases, death. If you're BMI is greater than 30 UR at high-risk of postsurgical complications such as when healing issues and infections. Long-Term Results- Subsequent alterations in the appearance of your body may occur as the result of aging, sun exposure, weight loss, weight gain, pregnancy, menopause or other circumstances not related to your surgery. Intimate Relations After Surgery- Surgery involves coagulating of blood vessels and increased activity of any kind may open these vessels leading to a bleed, or hematoma. Activity that increases your pulse or heart rate may cause additional bruising, swelling, and the need for return to surgery and control bleeding. It is wise to refrain from sexual activity until your physician states it is safe.     Body-Piercing Procedures- Individuals who currently wear body-piercing jewelry or are seeking to undergo body-piercing procedures must consider the possibility that an infection could develop anytime following this procedure. Treatment including antibiotics, hospitalization or additional surgery may be necessary. Mental Health Disorders and Elective Surgery- It is important that all patients seeking to undergo elective surgery have realistic expectations that focus on improvement rather than perfection. Complications or less than satisfactory results are sometimes unavoidable, may require additional surgery and often are stressful. Please openly discuss with your surgeon, prior to surgery, any history that you may have of significant emotional depression or mental health disorders. Although many individuals may benefit psychologically from the results of elective surgery, effects on mental health cannot be accurately predicted. Medications- There are many adverse reactions that occur as the result of taking over the counter, herbal, and/or prescription medications. Be sure to check with your physician about any drug interactions that may exist with medications which you are already taking. If you have an adverse reaction, stop the drugs immediately and call your plastic surgeon for further instructions. If the reaction is severe, go immediately to the nearest emergency room. When taking the prescribed pain medications after surgery, realize that they can affect your thought process and coordination. Do not drive, do not operate complex equipment, do not make any important decisions and do not drink any alcohol while taking these medications. Be sure to take your prescribed medication only as directed.   Smoking, Second-Hand Smoke Exposure, Nicotine Products (Patch, Gum, Nasal Spray)-   Patients who are currently smoking, use tobacco products, or nicotine products (patch, gum, or nasal spray) are at a greater risk for significant surgical complications of skin dying, delayed healing, and additional scarring. Individuals exposed to second-hand smoke are also at potential risk for similar complications attributable to nicotine exposure. Additionally, smoking may have a significant negative effect on anesthesia and recovery from anesthesia, with coughing and possibly increased bleeding. Individuals who are not exposed to tobacco smoke or nicotine-containing products have a significantly lower risk of this type of complication. You must stop smoking 6 weeks before and 6 weeks after your surgery. ADDITIONAL SURGERY NECESSARY  There are many variable conditions in addition to risk and potential surgical complications that may influence the long-term result from liposuction. Secondary surgery may be necessary to obtain optimal results. Even though risks and complications occur infrequently, the risks cited are particularly associated with a liposuction surgery. Other complications and risks can occur but are even more uncommon. Should complications occur, additional surgery or other treatments may be necessary. The practice of medicine and surgery is not an exact science. Although good results are expected, there is no guarantee or warranty expressed or implied, on the results that may be obtained. PATIENT COMPLIANCE   Follow all physician instructions carefully; this is essential for the success of your outcome. It is important that the surgical incisions are not subjected to excessive force, swelling, abrasion, or motion during the time of healing. Personal and vocational activity needs to be restricted. Protective dressings and drains should not be removed unless instructed by your plastic surgeon. Successful post-operative function depends on both surgery and subsequent care. Physical activity that increases your pulse or heart rate may cause bruising, swelling, fluid accumulation and the need for return to surgery.   It is wise to refrain from intimate physical activities after surgery until your physician states it is safe. It is important that you participate in follow-up care, return for aftercare, and promote your recovery after surgery. HEALTH INSURANCE  Most health insurance companies exclude coverage for cosmetic surgical operations such as liposuction surgery or any complications that might occur from surgery. Please carefully review your health insurance subscriber-information pamphlet or contact your insurance company for a detailed explanation of their policies. Most insurance plans exclude coverage for secondary or revisionary surgery. FINANCIAL RESPONSIBILITIES  The cost of surgery involves several charges for the services provided. The total includes fees charged by your surgeon, the cost of surgical supplies, anesthesia, laboratory tests, and possible outpatient hospital charges, depending on where the surgery is performed. Depending on whether the cost of surgery is covered by an insurance plan, you will be responsible for necessary co-payments, deductibles, and charges not covered. The fees charged for this procedure do not include any potential future costs for additional procedures that you elect to have or require in order to revise, optimize, or complete your outcome. Additional costs may occur should complications develop from the surgery. Secondary surgery or hospital day-surgery charges involved with revision surgery will also be your responsibility. In signing the consent for this surgery/procedure, you acknowledge that you have been informed about its risk and consequences and accept responsibility for the clinical decisions that were made along with the financial costs of all future treatments. The following information was discussed with the patient, but this is not an all-inclusive-other issue were also discussed with patient.   GENERAL INFORMATION  The surgical treatment of scars is a procedure frequently performed by plastic surgeons. Scars are the unavoidable result of injuries, disease, or surgery. It is impossible to totally remove the presence of a scar, yet plastic surgery may improve the appearance and texture of scars. There are many different techniques of scar revision surgery. Other treatments including physical or hand therapy may be needed in addition to surgery. ALTERNATIVE TREATMENTS  Alternative forms of treatment consist of not treating the scar condition, injections of cortisone type drugs into the scar, or the use of special compressive garments/devices worn over the scar. Dermabrasion, laser treatments and other surgical techniques may be used to revise scars. Risks and potential complications are associated with alternative forms of treatment. RISKS OF SCAR REVISION SURGERY  Every surgical procedure involves a certain amount of risk and it is important that you understand these risks and the possible complications associated with them. In addition, every procedure has limitations. An individual's choice to undergo a surgical procedure is based on the comparison of the risk to potential benefit. Although some of the patients do not experience these complications, you should an understanding of these possible complications. Bleeding- It is possible, to experience a bleeding episode during or after surgery. Intraoperative blood transfusions may be required. Should post-operative bleeding occur, it may require an emergency treatment to drain the accumulated blood or blood transfusion. Do not take any aspirin or anti-inflammatory medications for ten days before or after surgery, as this may increase the risk of bleeding. Non-prescription herbs and dietary supplements can increase the risk of surgical bleeding. Hematoma can occur at any time following injury.   If blood transfusions are necessary to treat blood loss, there is the risk of blood-related infections such as hepatitis and HIV (AIDS). Heparin medications that are used to prevent blood clots in veins can produce bleeding and decreased blood platelets. Infection- Infection is unusual after surgery. Should an infection occur, additional treatment including antibiotics, hospitalization, or additional surgery may be necessary. Scarring- All surgery leaves scars, some more visible than others. Although good wound healing after a surgical procedure is expected, abnormal scars may occur within the skin and deeper tissues. Scars may be unattractive and of different color than the surrounding skin tone. Scar appearance may also vary within the same scar. Scars may be asymmetrical (appear different on the right and left side of the body). There is the possibility of visible marks in the skin from sutures. In some cases, scars may require surgical revision or treatment. Skin Discoloration / Swelling- Some bruising and swelling normally occurs following surgery. The skin in or near the surgical site can appear either lighter or darker than surrounding skin. Although uncommon, swelling and skin discoloration may persist for long periods of time and, in rare situations, may be permanent. Skin Sensitivity- Itching, tenderness, or exaggerated responses to hot or cold temperatures may occur after surgery. Usually this resolves during healing, but in some situations,  it may be chronic or permanent. Pain- You will experience pain after your surgery. Pain of varying intensity and duration may occur and persist after surgery. Chronic pain may occur from nerves becoming trapped in scar tissue. Damage to Deeper Structures- There is the potential for injury to deeper structures including nerves, blood vessels, muscles, and lungs (pneumothorax) during any surgical procedure. The potential for this to occur varies according to where on the body surgery is being performed.  Injury to deeper structures may be temporary or permanent. Obesity: If you're BMI is greater than 30 you may have a higher chance of complications. This may include but not limited to wound healing and infections. Also, if you have other medical problems such as diabetes and hypertension it may affect your healing as well as your surgical results in bleeding. Wound Disruption- Until wound healing is complete, it is possible to split open the surgical wound where the scar revision was performed. Wound disruption can produce a poor surgical result. If this occurs, additional treatment may be necessary. Sutures- Some surgical techniques use deep sutures. You may notice these sutures after your surgery. Sutures may spontaneously poke through the skin, become visible or produce irritation that requires removal.    Skin Contour Irregularities- Contour irregularities and depressions may occur after scar revision surgery. Visible and palpable wrinkling of skin can occur. Residual skin irregularities at the ends of the incisions or dog ears are always a possibility and may require additional surgery. This may improve with time, or it can be surgically corrected. Allergic Reactions- In some cases, local allergies to tape, suture materials and glues, blood products, topical preparations or injected agents have been reported. Serious systemic reactions including shock (anaphylaxis) may occur to drugs used during surgery and prescription medications. Allergic reactions may require additional treatment. Surgical Anesthesia- Both local and general anesthesia involve risk. There is the possibility of complications, injury, and even death from all forms of surgical anesthesia or sedation. Delayed Healing- Wound disruption or delayed wound healing is possible.   Some areas of the skin may not heal normally and may take a long time to heal.  Some areas of skin may die, requiring frequent dressing changes or further surgery to remove the non-healed tissue. Smokers have a greater risk of skin loss and wound healing complications. Change in Skin Sensation- It is common to experience diminished (or loss) of skin sensation in areas that have had surgery. Diminished (or complete loss of skin sensation) may not totally resolve. Shock- In rare circumstances, your surgical procedure can cause severe trauma, particularly when multiple or extensive procedures are performed. Although serious complications are infrequent, infections or excessive fluid loss can lead to severe illness and even death. If surgical shock occurs, hospitalization and additional treatment would be necessary. Unsatisfactory Result- There is no guarantee or warranty expressed or implied, on the results that may be obtained. You may be disappointed with the results of scar revision surgery. This would include risks such as asymmetry, unacceptable visible deformities at the ends of the incisions (dog ears), loss of function, poor healing, wound disruption, skin death and loss of sensation. It may be necessary to perform additional surgery to improve your results. Cardiac and Pulmonary Complications- Surgery, especially longer procedures, may be associated with the formation of, or increase in, blood clots in the venous system. Pulmonary complications may occur secondarily to both blood clots (pulmonary emboli), fat deposits (fat emboli) or partial collapse of the lungs after general anesthesia. Pulmonary and fat emboli can be life-threatening or fatal in some circumstances. Air travel, inactivity and other conditions may increase the incidence of blood clots traveling to the lungs causing a major blood clot that may result in death. It is important to discuss with your physician any past history of blood clots or swollen legs that may contribute to this condition. Cardiac complications are a risk with any surgery and anesthesia, even in patients without symptoms.   If you experience shortness of breath, chest pains, or unusual heart beats, seek medical attention immediately. Should any of these complications occur, you may require hospitalization and additional treatment. Skin Disorders / Skin Cancer:  Skin disorders and skin cancer may occur independently of scar revision surgery. Long-Term Results- Subsequent alterations in scar appearance may occur as the result of aging, weight loss or gain, sun exposure, pregnancy, menopause, or other circumstances not related to scar revision surgery. Smoking, Second-Hand Smoke Exposure, Nicotine Products (Patch, Gum, Nasal Spray)-   Patients who are currently smoking, use tobacco products, or nicotine products (patch, gum, or nasal spray) are at a greater risk for significant surgical complications of skin dying, delayed healing, and additional scarring. Individuals exposed to second-hand smoke are also at potential risk for similar complications attributable to nicotine exposure. Additionally, smoking may have a significant negative effect on anesthesia and recovery from anesthesia, with coughing and possibly increased bleeding. Individuals who are not exposed to tobacco smoke or nicotine-containing products have a significantly lower risk of this type of complication. This also may apply to secondhand smoke as well. Mental Health Disorders and Elective Surgery- It is important that all patients seeking to undergo elective surgery have realistic expectations that focus on improvement rather than perfection. Complications or less than satisfactory results are sometimes unavoidable, may require additional surgery and often are stressful. Please openly discuss with your surgeon, prior to surgery, any history that you may have of significant emotional depression or mental health disorders.   Although many individuals may benefit psychologically from the results of elective surgery, effects on mental health cannot be swelling, abrasion, or motion during the time of healing. Personal and vocational activities that involve the potential for re-injury to the scar revision must be avoided until healing is completed. Protective dressings and drains should not be removed unless instructed by your plastic surgeon. Successful post-operative function depends on both surgery and subsequent care. Physical activity that increases your pulse or heart rate may cause bruising, swelling, fluid accumulation and the need for return to surgery. It is wise to refrain from intimate physical activities after surgery until your physician states it is safe. It is important that you participate in follow-up care, return for aftercare, and promote your recovery after surgery. HEALTH INSURANCE  Most health insurance companies exclude coverage for cosmetic surgical operations or any complications that might occur from surgery. Please carefully review your health insurance subscriber information pamphlet. Most insurance plans exclude coverage for secondary or revisionary surgery. The cost of surgery involves several charges for the services provided. The total includes fees charged by your surgeon, the cost of surgical supplies, anesthesia, laboratory tests, and possible hospital charges, depending on where the surgery is performed. Depending on whether the cost of surgery is covered by an insurance plan, you will be responsible for necessary co-payments, deductibles, and charges not covered. The fees charged for this procedure do not include any potential future costs for additional procedures that you elect to have or require in order to revise, optimize, or complete your outcome. Additional costs may occur should complications develop from the surgery. Secondary surgery or hospital day-surgery charges involved with revision surgery will also be your responsibility. I also informed the patient that photographs may be taken.   These images or illustration along with my medical records created in my case may be used for obtaining insurance approval, for use in examination, may assist in education, testing, credentialing and or certifying by [facility]. These images and records may be used to communicate with referring physician.       Electronically signed by:  Tobias Medina MD 2/17/2022

## 2022-02-17 NOTE — ANESTHESIA POSTPROCEDURE EVALUATION
POST- ANESTHESIA EVALUATION       Pt Name: Mac Post  MRN: [id number]  [place]: 1975  Date of evaluation: 2/17/2022  Time:  6:46 PM      /89   Pulse 71   Temp 96.8 °F (36 °C) (Temporal) Comment: warm blankets and warming device  Resp 14   Ht 5' 1\" (1.549 m)   Wt 158 lb (71.7 kg)   LMP  (LMP Unknown)   SpO2 99%   BMI 29.85 kg/m²      Consciousness Level  Awake  Cardiopulmonary Status  Stable  Pain Adequately Treated YES  Nausea / Vomiting  NO  Adequate Hydration  YES  Anesthesia Related Complications NONE      Electronically signed by Jose Elias Centeno MD on 2/17/2022 at 6:46 PM       Department of Anesthesiology  Postprocedure Note    Patient: Mac Post  MRN: [id number]  [place]: 1975  Date of evaluation: 2/17/2022  Time:  6:46 PM     Procedure Summary     Date: 02/17/22 Room / Location: [facility] [place] [address] [place] [address]    Anesthesia Start: 6236 Anesthesia Stop: 1837    Procedure: ABDOMINAL LIPECTOMY SUCTION WITH SCAR REVISION AND MONS LIFT (N/A Abdomen) 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        Anesthesia Post Evaluation

## 2022-02-17 NOTE — ANESTHESIA PRE PROCEDURE
Department of Anesthesiology  Preprocedure Note       Name:  Damon Wagoner   Age:  48 y.o.  :  1975                                          MRN:  [id number]         Date:  2022      Surgeon: Selma Alatorre):  Fernando Miramontes MD    Procedure: Procedure(s):  ABDOMINAL LIPECTOMY SUCTION WITH SCAR REVISION AND MONS LIFT    Medications prior to admission:   Prior to Admission medications    Medication Sig Start Date End Date Taking?  Authorizing Provider   triamterene-hydroCHLOROthiazide (MAXZIDE-25) 37.5-25 MG per tablet take 1 tablet by mouth once daily 21  Yes Shital Dao MD   lisinopril (PRINIVIL;ZESTRIL) 40 MG tablet take 1 tablet by mouth once daily 21  Yes Shital Dao MD   fluticasone Valerio Sours) 50 MCG/ACT nasal spray 2 sprays by Nasal route daily 21  Yes Shital Dao MD   lidocaine (LMX) 4 % cream Apply topically BID as needed for pain 10/7/21  Yes ALEISHA Zaamrripa - CNP   furosemide (LASIX) 20 MG tablet Take 20 mg by mouth 2 times daily   Yes Historical Provider, MD   aspirin 81 MG EC tablet Take 81 mg by mouth daily   Yes Historical Provider, MD   Adapalene-Benzoyl Peroxide 0.1-2.5 % GEL daily 10/13/20  Yes Sabrina Norman DO   ARIPiprazole (ABILIFY) 20 MG tablet take 1 tablet by mouth every morning 19  Yes Historical Provider, MD   busPIRone (BUSPAR) 15 MG tablet take 1 tablet by mouth twice a day 19  Yes Historical Provider, MD   benztropine (COGENTIN) 2 MG tablet take 1 tablet by mouth twice a day if needed 19  Yes Historical Provider, MD       Current medications:    Current Facility-Administered Medications   Medication Dose Route Frequency Provider Last Rate Last Admin    lidocaine PF 1 % injection 1 mL  1 mL IntraDERmal Once PRN Delaney John MD        lactated ringers infusion   IntraVENous Continuous Delaney John MD        sodium chloride flush 0.9 % injection 10 mL  10 mL IntraVENous 2 times per day MD Zoe Montelongo Sandoval sodium chloride flush 0.9 % injection 10 mL  10 mL IntraVENous PRN Amos Ruggiero MD        0.9 % sodium chloride infusion  25 mL IntraVENous PRN Amos Ruggiero MD           Allergies:     Allergies   Allergen Reactions    Celebrex [Celecoxib] Shortness Of Breath and Swelling    Pcn [Penicillins]      c/o yeast infection     Trazodone And Nefazodone      Suicidal thoughts       Problem List:    Patient Active Problem List   Diagnosis Code    Seasonal allergies J30.2    HTN (hypertension) I10    Iron (Fe) deficiency anemia D50.9    Obesity E66.9    GERD (gastroesophageal reflux disease) K21.9    Bell's palsy G51.0    Nasal congestion R09.81    Raynaud's phenomenon without gangrene I73.00    History of low transverse  section Z98.891    Bipolar 1 disorder, depressed (HCC) F31.9    Post traumatic stress disorder F43.10    Primary insomnia F51.01    Excess skin of abdomen L98.7    Major depressive disorder, recurrent episode, mild (HCC) F33.0    Chronic pain of right knee M25.561, G89.29    Right hip pain M25.551    Neck pain on right side M54.2    History of sinus surgery Z98.890    Episodic overuse of medication Z91.14    S/P bilateral breast reduction Z98.890    Rectus diastasis M62.08    Symptomatic abdominal panniculus E65       Past Medical History:        Diagnosis Date    Bell's palsy     Bipolar 1 disorder, depressed ([facility] [address] 2018    GERD (gastroesophageal reflux disease)     HTN (hypertension)     Iron (Fe) deficiency anemia     Obesity     Post traumatic stress disorder     Post-nasal drip     Seasonal allergies        Past Surgical History:        Procedure Laterality Date    BREAST REDUCTION SURGERY       SECTION      Twice    LIPECTOMY N/A 10/19/2021    PANNICULECTOMY/ABDOMINOPLASTY, TAP BLOCK, INCISIONAL WOUND VAC, BIOPATCH performed by Won Jacobsen MD at [address]         Social History:    Social History     Tobacco Use    Smoking status: Former Smoker     Quit date: 1995     Years since quittin.1    Smokeless tobacco: Never Used   Substance Use Topics    Alcohol use: Yes     Comment: occational wine                                Counseling given: Not Answered      Vital Signs (Current):   Vitals:    22 1526 22 0928   BP:  131/83   Pulse:  76   Resp:  18   Temp:  98 °F (36.7 °C)   TempSrc:  Temporal   SpO2:  99%   Weight: 157 lb (71.2 kg) 158 lb (71.7 kg)   Height: 5' 1\" (1.549 m) 5' 1\" (1.549 m)                                              BP Readings from Last 3 Encounters:   22 131/83   21 130/80   10/23/21 (!) 133/100       NPO Status: Time of last liquid consumption:                         Time of last solid consumption:                         Date of last liquid consumption: 22                        Date of last solid food consumption: 22    BMI:   Wt Readings from Last 3 Encounters:   22 158 lb (71.7 kg)   22 157 lb (71.2 kg)   21 168 lb (76.2 kg)     Body mass index is 29.85 kg/m². CBC:   Lab Results   Component Value Date    WBC 5.2 2022    RBC 4.77 2022    HGB 14.7 2022    HCT 45.9 2022    MCV 96.2 2022    RDW 14.6 2022     2022       CMP:   Lab Results   Component Value Date     2022    K 3.8 2022    CL 99 2022    CO2 22 2022    BUN 15 2022    CREATININE 0.65 2022    GFRAA >60 2022    LABGLOM >60 2022    GLUCOSE 84 2022    PROT 6.2 10/23/2021    CALCIUM 10.0 2022    BILITOT 0.25 10/23/2021    ALKPHOS 47 10/23/2021    AST 21 10/23/2021    ALT 16 10/23/2021       POC Tests: No results for input(s): POCGLU, POCNA, POCK, POCCL, POCBUN, POCHEMO, POCHCT in the last 72 hours.     Coags: No results found for: PROTIME, INR, APTT    HCG (If Applicable):   Lab Results   Component Value Date    HCG NEGATIVE 2022        ABGs: No results found for: PHART, PO2ART, VAR6ZFZ, XEW6QZQ, BEART, T9LUHOAG     Type & Screen (If Applicable):  No results found for: LABABO, LABRH    Drug/Infectious Status (If Applicable):  No results found for: HIV, HEPCAB    COVID-19 Screening (If Applicable): No results found for: COVID19        Anesthesia Evaluation  Patient summary reviewed and Nursing notes reviewed history of anesthetic complications:   Airway: Mallampati: II  TM distance: >3 FB   Neck ROM: full  Mouth opening: > = 3 FB Dental: normal exam         Pulmonary:Negative Pulmonary ROS and normal exam  breath sounds clear to auscultation                             Cardiovascular:  Exercise tolerance: no interval change,   (+) hypertension: no interval change,         Rhythm: regular  Rate: normal                    Neuro/Psych:   (+) neuromuscular disease:, psychiatric history: stable with treatmentdepression/anxiety              ROS comment: Bell's palsy GI/Hepatic/Renal:   (+) GERD: no interval change,           Endo/Other: Negative Endo/Other ROS                    Abdominal:       Abdomen: soft. Vascular: negative vascular ROS. Other Findings:           Anesthesia Plan      general     ASA 2     (Hx of awareness under anesthesia x 2)  Induction: intravenous. MIPS: Prophylactic antiemetics administered. Anesthetic plan and risks discussed with patient. Plan discussed with CRNA.                 Harlan Jenkins MD   2/17/2022

## 2022-02-18 VITALS
HEIGHT: 61 IN | DIASTOLIC BLOOD PRESSURE: 79 MMHG | OXYGEN SATURATION: 98 % | WEIGHT: 171.74 LBS | RESPIRATION RATE: 16 BRPM | SYSTOLIC BLOOD PRESSURE: 123 MMHG | BODY MASS INDEX: 32.42 KG/M2 | HEART RATE: 74 BPM | TEMPERATURE: 98.8 F

## 2022-02-18 LAB
HCT VFR BLD CALC: 34.5 % (ref 36–46)
HEMOGLOBIN: 11.6 G/DL (ref 12–16)
MCH RBC QN AUTO: 31.7 PG (ref 26–34)
MCHC RBC AUTO-ENTMCNC: 33.6 G/DL (ref 31–37)
MCV RBC AUTO: 94.2 FL (ref 80–100)
NRBC AUTOMATED: ABNORMAL PER 100 WBC
PDW BLD-RTO: 15 % (ref 12.5–15.4)
PLATELET # BLD: 270 K/UL (ref 140–450)
PMV BLD AUTO: 8.2 FL (ref 6–12)
RBC # BLD: 3.66 M/UL (ref 4–5.2)
WBC # BLD: 6.4 K/UL (ref 3.5–11)

## 2022-02-18 PROCEDURE — 6370000000 HC RX 637 (ALT 250 FOR IP): Performed by: PLASTIC SURGERY

## 2022-02-18 PROCEDURE — 36415 COLL VENOUS BLD VENIPUNCTURE: CPT

## 2022-02-18 PROCEDURE — 2580000003 HC RX 258: Performed by: PLASTIC SURGERY

## 2022-02-18 PROCEDURE — 85027 COMPLETE CBC AUTOMATED: CPT

## 2022-02-18 PROCEDURE — 6360000002 HC RX W HCPCS: Performed by: PLASTIC SURGERY

## 2022-02-18 PROCEDURE — G0378 HOSPITAL OBSERVATION PER HR: HCPCS

## 2022-02-18 RX ADMIN — ACETAMINOPHEN 650 MG: 325 TABLET ORAL at 09:12

## 2022-02-18 RX ADMIN — ACETAMINOPHEN 650 MG: 325 TABLET ORAL at 03:10

## 2022-02-18 RX ADMIN — BACLOFEN 10 MG: 10 TABLET ORAL at 09:12

## 2022-02-18 RX ADMIN — SODIUM CHLORIDE, PRESERVATIVE FREE 10 ML: 5 INJECTION INTRAVENOUS at 09:12

## 2022-02-18 RX ADMIN — GABAPENTIN 100 MG: 100 CAPSULE ORAL at 09:12

## 2022-02-18 RX ADMIN — DEXTROSE MONOHYDRATE 1000 MG: 5 INJECTION INTRAVENOUS at 06:25

## 2022-02-18 ASSESSMENT — PAIN SCALES - GENERAL
PAINLEVEL_OUTOF10: 10
PAINLEVEL_OUTOF10: 9

## 2022-02-18 NOTE — PLAN OF CARE
Problem: Pain:  Goal: Pain level will decrease  Description: Pain level will decrease  2/18/2022 1250 by Chacho Bravo RN  Outcome: Completed  2/18/2022 0928 by Chacho Bravo RN  Outcome: Ongoing  2/18/2022 0041 by Vj Balderrama RN  Outcome: Ongoing  Goal: Control of acute pain  Description: Control of acute pain  2/18/2022 1250 by Chacho Bravo RN  Outcome: Completed  2/18/2022 0928 by Chacho Bravo RN  Outcome: Ongoing  2/18/2022 0041 by Vj Balderrama RN  Outcome: Ongoing  Goal: Control of chronic pain  Description: Control of chronic pain  2/18/2022 1250 by Chacho Bravo RN  Outcome: Completed  2/18/2022 0928 by Chacho Bravo RN  Outcome: Ongoing  2/18/2022 0041 by Vj Balderrama RN  Outcome: Ongoing     Problem: SAFETY  Goal: Free from accidental physical injury  2/18/2022 1250 by Chacho Bravo RN  Outcome: Completed  2/18/2022 0928 by Chacho Bravo RN  Outcome: Ongoing  2/18/2022 0041 by Vj Balderrama RN  Outcome: Ongoing  Goal: Free from intentional harm  2/18/2022 1250 by Chacho Bravo RN  Outcome: Completed  2/18/2022 0928 by Chacho Bravo RN  Outcome: Ongoing  2/18/2022 0041 by Vj Balderrama RN  Outcome: Ongoing     Problem: DAILY CARE  Goal: Daily care needs are met  2/18/2022 1250 by Chacho Bravo RN  Outcome: Completed  2/18/2022 0928 by Chacho Bravo RN  Outcome: Ongoing  2/18/2022 0041 by Vj Balderrama RN  Outcome: Ongoing     Problem: SKIN INTEGRITY  Goal: Skin integrity is maintained or improved  2/18/2022 1250 by Chacho Bravo RN  Outcome: Completed  2/18/2022 0928 by Chacho Bravo RN  Outcome: Ongoing  2/18/2022 0041 by Vj Balderrama RN  Outcome: Ongoing     Problem: KNOWLEDGE DEFICIT  Goal: Patient/S.O. demonstrates understanding of disease process, treatment plan, medications, and discharge instructions.   2/18/2022 1250 by Chacho Bravo RN  Outcome: Completed  2/18/2022 0928 by Ebb Coke, RN  Outcome: Ongoing  2/18/2022 0041 by Vj Balderrama, RN  Outcome: Ongoing     Problem: DISCHARGE BARRIERS  Goal: Patient's continuum of care needs are met  2/18/2022 1250 by Angi Butler RN  Outcome: Completed  2/18/2022 0928 by Angi Butler RN  Outcome: Ongoing  2/18/2022 0041 by Toni Baig RN  Outcome: Ongoing     Problem: Nutritional:  Goal: Nutritional status will improve  Description: Nutritional status will improve  2/18/2022 1250 by Angi Butler RN  Outcome: Completed  2/18/2022 0928 by Angi Butler RN  Outcome: Ongoing     Problem: Physical Regulation:  Goal: Diagnostic test results will improve  Description: Diagnostic test results will improve  2/18/2022 1250 by Angi Butler RN  Outcome: Completed  2/18/2022 0928 by Angi Butler RN  Outcome: Ongoing  Goal: Will remain free from infection  Description: Will remain free from infection  2/18/2022 1250 by Angi Butler RN  Outcome: Completed  2/18/2022 0928 by Angi Butler RN  Outcome: Ongoing  Goal: Ability to maintain vital signs within normal range will improve  Description: Ability to maintain vital signs within normal range will improve  2/18/2022 1250 by Angi Butler RN  Outcome: Completed  2/18/2022 0928 by Angi Butler RN  Outcome: Ongoing     Problem: Respiratory:  Goal: Ability to maintain normal respiratory secretions will improve  Description: Ability to maintain normal respiratory secretions will improve  2/18/2022 1250 by Angi Butler RN  Outcome: Completed  2/18/2022 0928 by Angi Butler RN  Outcome: Ongoing     Problem: Skin Integrity:  Goal: Demonstration of wound healing without infection will improve  Description: Demonstration of wound healing without infection will improve  2/18/2022 1250 by Angi Butler RN  Outcome: Completed  2/18/2022 0928 by Angi Butler RN  Outcome: Ongoing  Goal: Complications related to intravenous access or infusion will be avoided or minimized  Description: Complications related to intravenous access or infusion will be avoided or minimized  2/18/2022 1250 by Angi Butler RN  Outcome: Completed  2/18/2022 5005 by Chacho Bravo, RN  Outcome: Ongoing

## 2022-02-18 NOTE — DISCHARGE SUMMARY
Discharge Summary    Pepe Haro Date/Time of Discharge: 2022 11:33 AM   CSN: [id number] Attending Provider: No att. providers found   Room[facility]Bed: [facility] : 1975 Age: 48 y.o. Date/Time of Admission: 2022        Admitting Physician:   Taryn Rothman MD     Discharge Physician:   As above    Admission Diagnoses:   Status post scar revision [Z98.890]    Discharge Diagnoses:   As above    Admission Condition:   fair     Discharged Condition:    fair    Indication for Admission:   Postoperative pain and nausea    Hospital Course:   Patient did well     Consults:   none    Significant Diagnostic Studies:   labs: CBC     Treatments:    surgery:     Discharge Exam:   Patient did well labs were reviewed    Disposition:   home  Discharge Medication List as of 2022 11:20 AM      START taking these medications    Details   HYDROcodone-acetaminophen (NORCO) 5-325 MG per tablet Take 1 tablet by mouth every 6 hours as needed for Pain for up to 7 days. Intended supply: 7 days. Take lowest dose possible to manage pain, Disp-28 tablet, R-0Normal      baclofen (LIORESAL) 10 MG tablet Take 1 tablet by mouth 3 times daily for 10 days, Disp-30 tablet, R-0Normal      gabapentin (NEURONTIN) 300 MG capsule Take 1 capsule by mouth 3 times daily for 10 days. , Disp-30 capsule, R-0Normal      ciprofloxacin (CIPRO) 500 MG tablet Take 1 tablet by mouth 2 times daily for 10 days, Disp-20 tablet, R-0Normal         CONTINUE these medications which have NOT CHANGED    Details   triamterene-hydroCHLOROthiazide (MAXZIDE-25) 37.5-25 MG per tablet take 1 tablet by mouth once daily, Disp-90 tablet, R-1PATIENT DUE FOR APPOINTMENTNormal      lisinopril (PRINIVIL;ZESTRIL) 40 MG tablet take 1 tablet by mouth once daily, Disp-90 tablet, R-1Normal      fluticasone (FLONASE) 50 MCG/ACT nasal spray 2 sprays by Nasal route daily, Disp-16 g, R-0Normal      lidocaine (LMX) 4 % cream Apply topically BID as needed for pain, Disp-1 each, R-2, Normal      furosemide (LASIX) 20 MG tablet Take 20 mg by mouth daily as needed Historical Med      aspirin 81 MG EC tablet Take 81 mg by mouth dailyHistorical Med      Adapalene-Benzoyl Peroxide 0.1-2.5 % GEL daily, Disp-45 g,R-2Normal      ARIPiprazole (ABILIFY) 20 MG tablet take 1 tablet by mouth every morning, R-0Historical Med      busPIRone (BUSPAR) 15 MG tablet take 1 tablet by mouth twice a day, R-0Historical Med      benztropine (COGENTIN) 2 MG tablet take 1 tablet by mouth twice a day if needed, R-0Historical Med           Activity: no lifting, Driving, or Strenuous activity until office visit  Diet: regular diet  Wound Care: Keep dressing intact    Follow-up with Dr. Ernie Samano 1 week    Aleida Bowen MD 2/18/2022

## 2022-02-18 NOTE — CARE COORDINATION
Case Management Initial Discharge Plan  Tai Levi,             Met with:patient to discuss discharge plans. Information verified: address, contacts, phone number, , insurance Yes  Insurance Provider: Preethi Avila Dual    Emergency Contact/Next of Kin name & number: Anthony Rosario [phone] father   Who are involved in patient's support system? family    PCP: Edvin Ortiz MD  Date of last visit: 2021      Discharge Planning    Living Arrangements:  Alone     Lives in an apartment  Patient able to perform ADL's:Independent    Current Services (outpatient & in home) none  DME equipment: none  DME provider:     Is patient receiving oral anticoagulation therapy? No    Potential Assistance Needed:  N/A    Patient agreeable to home care: No  [place] of choice provided:  no    Prior SNF/Rehab Placement and Facility: no  Agreeable to SNF/Rehab: No  [place] of choice provided: no     Evaluation: no    Expected Discharge date:  22    Patient expects to be discharged to: If home: is the family and/or caregiver wiling & able to provide support at home? n/a  Who will be providing this support? independent    Follow Up Appointment: Best Day/ Time:     Transportation provider: self, drives  Transportation arrangements needed for discharge: No    Readmission Risk              Risk of Unplanned Readmission:  0         Does patient have a readmission risk score greater than 14?: No  If yes, follow-up appointment must be made within 7 days of discharge.      Goals of Care: post op care      Educated patient on transitional options, provided freedom of choice and are agreeable with plan      Discharge Plan: home, independent - [facility] letter explained and given to and signed by patient      Electronically signed by Sherie Lenz RN on 22 at 9:13 AM EST

## 2022-02-18 NOTE — PROGRESS NOTES
Pt currently vomitting. Last zofran given at 5. Orders are for q6hrs. Per Dr Aleksandra Mendenhall, ok to give zofran or phenergan early.

## 2022-02-18 NOTE — PLAN OF CARE
Problem: Pain:  Goal: Pain level will decrease  Description: Pain level will decrease  2/18/2022 1250 by Shara Hyde RN  Outcome: Completed  2/18/2022 0928 by Shara Hyde RN  Outcome: Ongoing  2/18/2022 0041 by Gretta Dakins, RN  Outcome: Ongoing   Pain scale preformed per protocol and pt treated for pain as documented. Education given. Problem: SAFETY  Goal: Free from accidental physical injury  2/18/2022 1250 by Shara Hyde RN  Outcome: Completed  2/18/2022 0928 by Shara Hyde RN  Outcome: Ongoing  2/18/2022 0041 by Gretta Dakins, RN  Outcome: Ongoing  Fall assessment preformed. Bed in low locked position with call light and tray table within reach. Education given. Will continue to monitor.

## 2022-02-18 NOTE — PROGRESS NOTES
CLINICAL PHARMACY NOTE: MEDS TO BEDS    Total # of Prescriptions Filled: 4   The following medications were delivered to the patient:  · Gabapentin 300mg  · Cipro 500mg  · Baclofen 10mg  · Norco 5-325    Additional Documentation:

## 2022-02-21 LAB — SURGICAL PATHOLOGY REPORT: NORMAL

## 2022-03-01 ENCOUNTER — TELEPHONE (OUTPATIENT)
Dept: SURGERY | Age: 47
End: 2022-03-01

## 2022-03-04 ENCOUNTER — OFFICE VISIT (OUTPATIENT)
Dept: SURGERY | Age: 47
End: 2022-03-04

## 2022-03-04 VITALS — BODY MASS INDEX: 32.28 KG/M2 | WEIGHT: 171 LBS | HEIGHT: 61 IN | RESPIRATION RATE: 12 BRPM

## 2022-03-04 DIAGNOSIS — Z09 POSTOPERATIVE EXAMINATION: Primary | ICD-10-CM

## 2022-03-04 PROCEDURE — 99024 POSTOP FOLLOW-UP VISIT: CPT | Performed by: PLASTIC SURGERY

## 2022-03-04 NOTE — PROGRESS NOTES
[address]  [address] [place] [address],[address]  [address] [place] [id number]       OFFICE POST-OP NOTE    Patient Name:  Sheldon White    :  1975    MRN:  [id number]  STATUS POST  Chief Complaint   Patient presents with    Post-Op Check     liposuction DOS- 22       SUBJECTIVE  Patient seen and examined. Patient status post liposuction scar revision 1 month left on 22. Patient is very happy with her results. Patient is doing well overall. Drain is putting out minimal drainage. PHYSICAL EXAM  Vital Signs:  Resp 12   Ht 5' 1\" (1.549 m)   Wt 171 lb (77.6 kg)   LMP  (LMP Unknown)   BMI 32.31 kg/m²     Incisions:  Suture line clean dry and intact. Skin:  No evidence of infection. Neurologic:  Alert & oriented x 3. ASSESSMENT   Diagnosis Orders   1. Postoperative examination         PLAN  1. We will remove her drain. Patient is to follow-up in 1 to 2 weeks. Plan discussed with patient.     Electronically signed by:  Veda Rader M.D.   3/4/2022

## 2022-03-13 NOTE — PROGRESS NOTES
Miah Harden presents today with a history of abnormal vaginal discharge. The discharge has been present for 3 weeks and is associated with foul  odor, but without any irritation or itching. She denies any fever, chills, nausea/vomiting, significant abdominal/pelvic discomfort, abnormal bleeding or UTI symptoms. She is sexually. Physical exam:  There were no vitals filed for this visit. The abdomen is soft and nontender to deep palpation without organomegaly, masses or CVAT. Bowel sounds are normally active. External genitalia without lesions or inflammation noted. The vagina with scant amount of white mucus appearing discharge and no vaginal or cervical lesions or inflammation noted. Uterus nongravid and without CMT. Adnexa nontender and without abnormal masses bilaterally. Assessment/Plan:    ICD-10-CM    1. Vaginal discharge  N89.8    2. Vaginal odor  N89.8      Vaginitis DNA probe was done. Urine culture was not sent. She will be contacted with results and recommendations. She will return to the office for her next scheduled appointment or prn. Jamie Thorpe MD, [facility] Sheryl Sweet.   [address] [place] [address],[address]

## 2022-03-15 ENCOUNTER — HOSPITAL ENCOUNTER (OUTPATIENT)
Age: 47
Setting detail: SPECIMEN
Discharge: HOME | End: 2022-03-15

## 2022-03-15 ENCOUNTER — OFFICE VISIT (OUTPATIENT)
Dept: OBGYN CLINIC | Age: 47
End: 2022-03-15
Payer: COMMERCIAL

## 2022-03-15 VITALS
BODY MASS INDEX: 32.28 KG/M2 | DIASTOLIC BLOOD PRESSURE: 88 MMHG | HEIGHT: 61 IN | WEIGHT: 171 LBS | SYSTOLIC BLOOD PRESSURE: 122 MMHG

## 2022-03-15 DIAGNOSIS — N89.8 VAGINAL DISCHARGE: Primary | ICD-10-CM

## 2022-03-15 DIAGNOSIS — N89.8 VAGINAL ODOR: ICD-10-CM

## 2022-03-15 PROCEDURE — G8484 FLU IMMUNIZE NO ADMIN: HCPCS | Performed by: OBSTETRICS & GYNECOLOGY

## 2022-03-15 PROCEDURE — G8427 DOCREV CUR MEDS BY ELIG CLIN: HCPCS | Performed by: OBSTETRICS & GYNECOLOGY

## 2022-03-15 PROCEDURE — 99213 OFFICE O/P EST LOW 20 MIN: CPT | Performed by: OBSTETRICS & GYNECOLOGY

## 2022-03-15 PROCEDURE — 1036F TOBACCO NON-USER: CPT | Performed by: OBSTETRICS & GYNECOLOGY

## 2022-03-15 PROCEDURE — G8417 CALC BMI ABV UP PARAM F/U: HCPCS | Performed by: OBSTETRICS & GYNECOLOGY

## 2022-03-15 NOTE — PATIENT INSTRUCTIONS
We will contact you with results from today's testing. If treatment is needed we will send that to your pharmacy.   Return to the office for your next scheduled annual.

## 2022-03-16 ENCOUNTER — OFFICE VISIT (OUTPATIENT)
Dept: SURGERY | Age: 47
End: 2022-03-16

## 2022-03-16 VITALS — RESPIRATION RATE: 12 BRPM | WEIGHT: 171 LBS | BODY MASS INDEX: 32.28 KG/M2 | HEIGHT: 61 IN | OXYGEN SATURATION: 100 %

## 2022-03-16 DIAGNOSIS — N89.8 VAGINAL ODOR: ICD-10-CM

## 2022-03-16 DIAGNOSIS — N89.8 VAGINAL DISCHARGE: ICD-10-CM

## 2022-03-16 DIAGNOSIS — Z09 POSTOPERATIVE EXAMINATION: Primary | ICD-10-CM

## 2022-03-16 LAB
CANDIDA SPECIES, DNA PROBE: NEGATIVE
GARDNERELLA VAGINALIS, DNA PROBE: POSITIVE
REASON FOR REJECTION: NORMAL
SOURCE: ABNORMAL
TRICHOMONAS VAGINALIS DNA: NEGATIVE
ZZ NTE CLEAN UP: ORDERED TEST: NORMAL
ZZ NTE WITH NAME CLEAN UP: SPECIMEN SOURCE: NORMAL

## 2022-03-16 PROCEDURE — 99024 POSTOP FOLLOW-UP VISIT: CPT | Performed by: PLASTIC SURGERY

## 2022-03-16 RX ORDER — METRONIDAZOLE 500 MG/1
500 TABLET ORAL 2 TIMES DAILY
Qty: 14 TABLET | Refills: 0 | Status: SHIPPED | OUTPATIENT
Start: 2022-03-16 | End: 2022-03-23

## 2022-03-16 NOTE — PROGRESS NOTES
[address] [facility]  [address] [place] [address],[address]  [address] [place] [id number]       OFFICE POST-OP NOTE    Patient Name:  Mac Post    :  1975    MRN:  [id number]   Chief Complaint   Patient presents with    Post-Op Check     liposuction DOS- 22       SUBJECTIVE  Patient seen and examined. Patient status post liposuction scar revision 1 month left on 22. Patient is very happy with her results. Patient is doing well overall. PHYSICAL EXAM  Vital Signs:  Resp 12   Ht 5' 1\" (1.549 m)   Wt 171 lb (77.6 kg)   LMP  (LMP Unknown)   SpO2 100%   BMI 32.31 kg/m²     Incisions:  Suture line clean dry and intact. Patient had one suture that was protruding through the incision. Skin:  No evidence of infection. Neurologic:  Alert & oriented x 3. ASSESSMENT   Diagnosis Orders   1. Postoperative examination         PLAN  1. Which was removed. 2.  Patient is getting thickening of the scar. Recommend massaging the scar  Patient is to follow-up in 2- 3 weeks    Plan discussed with patient.     Electronically signed by:  Jarret Griffith M.D.   3/16/2022

## 2022-03-29 RX ORDER — HYDROXYZINE HYDROCHLORIDE 25 MG/1
TABLET, FILM COATED ORAL
COMMUNITY
Start: 2022-02-22

## 2022-03-29 ASSESSMENT — PATIENT HEALTH QUESTIONNAIRE - PHQ9
SUM OF ALL RESPONSES TO PHQ QUESTIONS 1-9: 2
SUM OF ALL RESPONSES TO PHQ9 QUESTIONS 1 & 2: 2
SUM OF ALL RESPONSES TO PHQ QUESTIONS 1-9: 2
SUM OF ALL RESPONSES TO PHQ QUESTIONS 1-9: 2
1. LITTLE INTEREST OR PLEASURE IN DOING THINGS: 2
2. FEELING DOWN, DEPRESSED OR HOPELESS: 0
SUM OF ALL RESPONSES TO PHQ QUESTIONS 1-9: 2

## 2022-03-29 NOTE — PROGRESS NOTES
Visit Information    Have you changed or started any medications since your last visit including any over-the-counter medicines, vitamins, or herbal medicines? no   Have you stopped taking any of your medications? Is so, why? -  no  Are you having any side effects from any of your medications? - no    Have you seen any other physician or provider since your last visit?  no   Have you had any other diagnostic tests since your last visit?  no   Have you been seen in the emergency room and/or had an admission in a hospital since we last saw you?  yes    Have you had your routine dental cleaning in the past 6 months?  yes    Do you have an active MyChart account? If no, what is the barrier?   Yes    Patient Care Team:  Trae Jauregui MD as PCP - General (Family Medicine)  Trae Jauregui MD as PCP - [facility]  Naif Carmona DPM as Surgeon (Podiatry)  Christofer Krueger MD (Emergency Medicine)  ALEISHA Terry - CNP as Nurse Practitioner (Family Nurse Practitioner)    Medical History Review  Past Medical, Family, and Social History reviewed and does contribute to the patient presenting condition    Health Maintenance   Topic Date Due    Hepatitis C screen  Never done    Colorectal Cancer Screen  Never done    Flu vaccine (1) Never done    COVID-19 Vaccine (3 - Booster for [employer] series) 02/19/2022    DTaP/Tdap/Td vaccine (1 - Tdap) 06/08/2022 (Originally 5/21/1992)    Depression Monitoring  12/16/2022    Potassium monitoring  02/09/2023    Creatinine monitoring  02/09/2023    Cervical cancer screen  12/03/2024    Lipid screen  04/12/2026    HIV screen  Completed    Hepatitis A vaccine  Aged Out    Hepatitis B vaccine  Aged Out    Hib vaccine  Aged Out    Meningococcal (ACWY) vaccine  Aged Out    Pneumococcal 0-64 years Vaccine  Aged Out

## 2022-03-30 ENCOUNTER — TELEMEDICINE (OUTPATIENT)
Dept: FAMILY MEDICINE CLINIC | Age: 47
End: 2022-03-30
Payer: COMMERCIAL

## 2022-03-30 DIAGNOSIS — K21.9 GASTROESOPHAGEAL REFLUX DISEASE, UNSPECIFIED WHETHER ESOPHAGITIS PRESENT: ICD-10-CM

## 2022-03-30 DIAGNOSIS — E66.09 CLASS 1 OBESITY DUE TO EXCESS CALORIES WITHOUT SERIOUS COMORBIDITY WITH BODY MASS INDEX (BMI) OF 32.0 TO 32.9 IN ADULT: ICD-10-CM

## 2022-03-30 DIAGNOSIS — Z12.31 SCREENING MAMMOGRAM FOR HIGH-RISK PATIENT: ICD-10-CM

## 2022-03-30 DIAGNOSIS — I10 PRIMARY HYPERTENSION: Primary | ICD-10-CM

## 2022-03-30 DIAGNOSIS — K59.04 CHRONIC IDIOPATHIC CONSTIPATION: ICD-10-CM

## 2022-03-30 DIAGNOSIS — D50.8 OTHER IRON DEFICIENCY ANEMIA: ICD-10-CM

## 2022-03-30 DIAGNOSIS — Z98.890 S/P GASTRIC SURGERY: ICD-10-CM

## 2022-03-30 DIAGNOSIS — F33.0 MAJOR DEPRESSIVE DISORDER, RECURRENT EPISODE, MILD (HCC): ICD-10-CM

## 2022-03-30 DIAGNOSIS — F51.01 PRIMARY INSOMNIA: ICD-10-CM

## 2022-03-30 PROBLEM — D64.9 ABSOLUTE ANEMIA: Status: ACTIVE | Noted: 2022-03-30

## 2022-03-30 PROBLEM — M25.551 RIGHT HIP PAIN: Status: RESOLVED | Noted: 2021-06-08 | Resolved: 2022-03-30

## 2022-03-30 PROBLEM — L98.7 EXCESS SKIN OF ABDOMEN: Status: RESOLVED | Noted: 2021-06-08 | Resolved: 2022-03-30

## 2022-03-30 PROCEDURE — G8427 DOCREV CUR MEDS BY ELIG CLIN: HCPCS | Performed by: FAMILY MEDICINE

## 2022-03-30 PROCEDURE — 99214 OFFICE O/P EST MOD 30 MIN: CPT | Performed by: FAMILY MEDICINE

## 2022-03-30 RX ORDER — AVOBENZONE, HOMOSALATE, OCTISALATE, OCTOCRYLENE, AND OXYBENZONE 29.4; 147; 49; 25.4; 58.8 MG/ML; MG/ML; MG/ML; MG/ML; MG/ML
LOTION TOPICAL
Qty: 3 EACH | Refills: 3 | Status: SHIPPED | OUTPATIENT
Start: 2022-03-30

## 2022-03-30 RX ORDER — FUROSEMIDE 20 MG/1
20 TABLET ORAL DAILY PRN
Qty: 60 TABLET | Refills: 1 | Status: SHIPPED | OUTPATIENT
Start: 2022-03-30

## 2022-03-30 RX ORDER — TRIAMTERENE AND HYDROCHLOROTHIAZIDE 37.5; 25 MG/1; MG/1
TABLET ORAL
Qty: 90 TABLET | Refills: 1 | Status: SHIPPED | OUTPATIENT
Start: 2022-03-30

## 2022-03-30 RX ORDER — LISINOPRIL 40 MG/1
TABLET ORAL
Qty: 90 TABLET | Refills: 1 | Status: SHIPPED | OUTPATIENT
Start: 2022-03-30 | End: 2022-10-05 | Stop reason: SDUPTHER

## 2022-03-30 ASSESSMENT — ENCOUNTER SYMPTOMS
ABDOMINAL PAIN: 1
COUGH: 0
RECTAL PAIN: 0
BACK PAIN: 0
SHORTNESS OF BREATH: 0
RHINORRHEA: 0
WHEEZING: 0
VOMITING: 0
COLOR CHANGE: 0
DIARRHEA: 0
CHEST TIGHTNESS: 0
NAUSEA: 0
SORE THROAT: 0
ABDOMINAL DISTENTION: 0
EYE REDNESS: 0
CONSTIPATION: 1
BLOOD IN STOOL: 0
SINUS PRESSURE: 0
STRIDOR: 0
TROUBLE SWALLOWING: 0

## 2022-03-30 NOTE — PROGRESS NOTES
3/30/2022    TELEHEALTH EVALUATION -- Audio/Visual (During EPSGV-16 public health emergency)      Sheldon White (:  1975) is a 48 y.o. female,Established patient, here for evaluation of the following chief complaint(s): 3 Month Follow-Up        ASSESSMENT/PLAN:    1. Primary hypertension  Controlled, continue same medications follow-up in 3 months in office. Monitor blood pressure occasionally at pharmacy. -     lisinopril (PRINIVIL;ZESTRIL) 40 MG tablet; take 1 tablet by mouth once daily, Disp-90 tablet, R-1Normal  -     furosemide (LASIX) 20 MG tablet; Take 1 tablet by mouth daily as needed (pedal edema) Take 20 mg by mouth daily as needed, Disp-60 tablet, R-1Normal  -     triamterene-hydroCHLOROthiazide (MAXZIDE-25) 37.5-25 MG per tablet; take 1 tablet by mouth once daily, Disp-90 tablet, R-1PATIENT DUE FOR APPOINTMENTNormal  2. Chronic idiopathic constipation  Discussed with patient to do Cologuard test started high-fiber diet and Metamucil.  -     Psyllium (METAMUCIL FIBER) 51.7 % PACK; Take 1 packet everyday, Disp-3 each, R-3Normal  3. Other iron deficiency anemia  Mild anemia discussed to increase daily for vegetables, continue multivitamins  4. Major depressive disorder, recurrent episode, mild ([address]  Stable continue current treatment continue to follow with psychiatrist  5. Gastroesophageal reflux disease, unspecified whether esophagitis present  6. Primary insomnia  7. Class 1 obesity due to excess calories without serious comorbidity with body mass index (BMI) of 32.0 to 32.9 in adult  Continue lifestyle changes increase physical activity  -     Lipid Panel; Future  -     Vitamin D 25 Hydroxy; Future  8. S/P gastric surgery  Doing well recheck vitamin D and B12  -     Vitamin D 25 Hydroxy; Future  -     Vitamin B12 & Folate; Future  -     Hepatic Function Panel; Future  9. Screening mammogram for high-risk patient  -     AURELIA DIGITAL SCREEN W OR WO CAD BILATERAL;  Future      Edy Packer received counseling on the following healthy behaviors: nutrition, exercise and medication adherence  Reviewed prior labs and health maintenance  Discussed use, benefit, and side effects of prescribed medications. Barriers to medication compliance addressed. Patient given educational materials - see patient instructions  All patient questions answered. Patient voiced understanding. The patient's past medical,surgical, social, and family history as well as her current medications and allergies were reviewed as documented in today's encounter. Medications, labs, diagnostic studies, consultations and follow-up as documented in this encounter. No follow-ups on file. Data Unavailable    Future Appointments   Date Time Provider Elbert Ariza   2022 11:30 AM Zackery Rodgers MD [id number]         SUBJECTIVE/OBJECTIVE:  Hector Esparza (:  1975) has requested an audio/video evaluation for the following concern(s):3 Month Follow-Up    Patient is scheduled for 3-month follow-up for chronic medical conditions. Hypertension usually controlled, reports it usually runs low in 110 over 80s. She does not check at home insurance did not cover the blood pressure monitor. She occasionally checks it pharmacy. Patient is compliant with medication denies any side effects. Patient is complaining of constipation which is chronic, reports she cannot take iron tablets that makes it worse. Patient takes over-the-counter Metamucil reports that is helping. Patient reports she cannot afford due to its cost.  She denies any blood with stools any weight loss or any decreased appetite. Patient has mild anemia on blood work, is on multivitamins cannot take iron supplements. Patient takes green leafy vegetables.     Bipolar disorder and depression he is on multiple psych medications, recently started on Atarax follows with psychiatrist.      Insomnia fairly stable on current treatment. History of GERD stable. Obesity stable patient has not lost any weight since last 2 months. PHQ Scores 3/29/2022 12/16/2021 10/7/2021 6/8/2021 4/20/2021 7/16/2020 4/23/2018   PHQ2 Score 2 0 2 2 0 0 2   PHQ9 Score 2 0 4 2 0 0 2     Interpretation of Total Score Depression Severity: 1-4 = Minimal depression, 5-9 = Mild depression, 10-14 = Moderate depression, 15-19 = Moderately severe depression, 20-27 = Severe depression    Ht Readings from Last 3 Encounters:   03/16/22 5' 1\" (1.549 m)   03/15/22 5' 1\" (1.549 m)   03/04/22 5' 1\" (1.549 m)     Wt Readings from Last 3 Encounters:   03/16/22 171 lb (77.6 kg)   03/15/22 171 lb (77.6 kg)   03/04/22 171 lb (77.6 kg)         Review of Systems   Constitutional: Positive for unexpected weight change. Negative for activity change, appetite change, fatigue and fever. HENT: Negative for congestion, ear pain, postnasal drip, rhinorrhea, sinus pressure, sore throat and trouble swallowing. Eyes: Negative for redness and visual disturbance. Respiratory: Negative for cough, chest tightness, shortness of breath, wheezing and stridor. Cardiovascular: Negative for chest pain, palpitations and leg swelling. Gastrointestinal: Positive for abdominal pain and constipation. Negative for abdominal distention, blood in stool, diarrhea, nausea, rectal pain and vomiting. Endocrine: Negative for polydipsia, polyphagia and polyuria. Genitourinary: Negative for difficulty urinating, flank pain, frequency and urgency. Musculoskeletal: Negative for arthralgias, back pain, gait problem, myalgias and neck pain. Skin: Positive for rash. Negative for color change and wound. Allergic/Immunologic: Negative for food allergies and immunocompromised state. Neurological: Negative for dizziness, speech difficulty, weakness, light-headedness, numbness and headaches.    Psychiatric/Behavioral: Negative for agitation, behavioral problems, decreased concentration, dysphoric mood, hallucinations, sleep disturbance and suicidal ideas. The patient is nervous/anxious. Prior to Visit Medications    Medication Sig Taking? Authorizing Provider   Psyllium (METAMUCIL FIBER) 51.7 % PACK Take 1 packet everyday Yes Dante Smith MD   lisinopril (PRINIVIL;ZESTRIL) 40 MG tablet take 1 tablet by mouth once daily Yes Dante Smith MD   furosemide (LASIX) 20 MG tablet Take 1 tablet by mouth daily as needed (pedal edema) Take 20 mg by mouth daily as needed Yes Dante Smith MD   triamterene-hydroCHLOROthiazide (MAXZIDE-25) 37.5-25 MG per tablet take 1 tablet by mouth once daily Yes Dante Smith MD   hydrOXYzine (ATARAX) 25 MG tablet take 1 to 2 tablets by mouth once daily if needed maximum daily dose of 2 Yes Historical Provider, MD   fluticasone (FLONASE) 50 MCG/ACT nasal spray 2 sprays by Nasal route daily Yes Dante Smith MD   lidocaine (LMX) 4 % cream Apply topically BID as needed for pain Yes Anisa Zavaleta, APRN - CNP   aspirin 81 MG EC tablet Take 81 mg by mouth daily Yes Historical Provider, MD   ARIPiprazole (ABILIFY) 20 MG tablet take 1 tablet by mouth every morning Yes Historical Provider, MD   busPIRone (BUSPAR) 15 MG tablet take 1 tablet by mouth twice a day Yes Historical Provider, MD   benztropine (COGENTIN) 2 MG tablet take 1 tablet by mouth twice a day if needed Yes Historical Provider, MD   gabapentin (NEURONTIN) 300 MG capsule Take 1 capsule by mouth 3 times daily for 10 days.   Patient not taking: Reported on 3/29/2022  Clover Hillman MD       Social History     Tobacco Use    Smoking status: Former Smoker     Quit date: 1995     Years since quittin.2    Smokeless tobacco: Never Used   Vaping Use    Vaping Use: Never used   Substance Use Topics    Alcohol use: Yes     Comment: occational wine    Drug use: No          PHYSICAL EXAMINATION:    Vital Signs: (As obtained by patient/caregiver or practitioner observation)  -vital signs stable and within normal limits except   Patient-Reported Vitals 3/30/2022   Patient-Reported Weight 156   Patient-Reported Height 51   Patient-Reported Systolic -   Patient-Reported Diastolic -           Intensity of pain is: None      Constitutional: [x] Appears well-developed and well-nourished [x] No apparent distress      [] Abnormal-       Mental status  [x] Alert and awake  [x] Oriented to person/place/time [x]Able to follow commands      Eyes:  EOM    [x]  Normal  [] Abnormal-  Sclera  [x]  Normal  [] Abnormal -         Discharge [x]  None visible  [] Abnormal -    HENT:   [x] Normocephalic, atraumatic. [] Abnormal   [x] Mouth/Throat: Mucous membranes are moist.     External Ears [x] Normal  [] Abnormal-     Neck: [x] No visualized mass     Pulmonary/Chest: [x] Respiratory effort normal.  [x] No visualized signs of difficulty breathing or respiratory distress        [] Abnormal        Musculoskeletal:   [x] Normal gait with no signs of ataxia         [x] Normal range of motion of neck        [] Abnormal-       Neurological:        [x] No Facial Asymmetry (Cranial nerve 7 motor function) (limited exam to video visit)          [x] No gaze palsy        [] Abnormal-            Skin:        [x] No significant exanthematous lesions or discoloration noted on facial skin         [] Abnormal-            Psychiatric:      [x] No Hallucinations       [x]Mood is normal      [x]Behavior is normal      [x]Judgment is normal      [x]Thought content is normal       [] Abnormal-     Other pertinent observable physical exam findings-     Due to this being a TeleHealth encounter, evaluation of the following organ systems is limited: Vitals/Constitutional/EENT/Resp/CV/GI//MS/Neuro/Skin/Heme-Lymph-Imm. I personally reviewed most recent labs reviewed with the patient and all questions fully answered.      Otherwise labs within normal limits        Lab Results   Component Value Date    WBC 6.4 02/18/2022    HGB 11.6 (L) 02/18/2022 HCT 34.5 (L) 02/18/2022    MCV 94.2 02/18/2022     02/18/2022       Lab Results   Component Value Date     02/09/2022    K 3.8 02/09/2022    CL 99 02/09/2022    CO2 22 02/09/2022    BUN 15 02/09/2022    CREATININE 0.65 02/09/2022    GLUCOSE 84 02/09/2022    CALCIUM 10.0 02/09/2022        Lab Results   Component Value Date    ALT 16 10/23/2021    AST 21 10/23/2021    ALKPHOS 47 10/23/2021    BILITOT 0.25 (L) 10/23/2021       Lab Results   Component Value Date    TSH 0.77 04/12/2021       Lab Results   Component Value Date    CHOL 142 04/12/2021    CHOL 141 03/30/2016     Lab Results   Component Value Date    TRIG 43 04/12/2021    TRIG 44 03/30/2016     Lab Results   Component Value Date    HDL 57 04/12/2021    HDL 55 06/16/2017    HDL 58 03/30/2016     Lab Results   Component Value Date    LDLCHOLESTEROL 76 04/12/2021    LDLCHOLESTEROL 77 06/16/2017    LDLCHOLESTEROL 74 03/30/2016       Lab Results   Component Value Date    CHOLHDLRATIO 2.5 04/12/2021    CHOLHDLRATIO 2.6 06/16/2017    CHOLHDLRATIO 2.4 03/30/2016       Lab Results   Component Value Date    LABA1C 5.1 04/12/2021         No results found for: NOSERLII43    No results found for: VITD25    Orders Placed This Encounter   Medications    Psyllium (METAMUCIL FIBER) 51.7 % PACK     Sig: Take 1 packet everyday     Dispense:  3 each     Refill:  3    lisinopril (PRINIVIL;ZESTRIL) 40 MG tablet     Sig: take 1 tablet by mouth once daily     Dispense:  90 tablet     Refill:  1    furosemide (LASIX) 20 MG tablet     Sig: Take 1 tablet by mouth daily as needed (pedal edema) Take 20 mg by mouth daily as needed     Dispense:  60 tablet     Refill:  1    triamterene-hydroCHLOROthiazide (MAXZIDE-25) 37.5-25 MG per tablet     Sig: take 1 tablet by mouth once daily     Dispense:  90 tablet     Refill:  1     PATIENT DUE FOR APPOINTMENT       Orders Placed This Encounter   Procedures    AURELIA DIGITAL SCREEN W OR WO CAD BILATERAL     Standing Status:   Future the visit. --Trae Jauregui MD on 3/30/2022 at 1:28 PM    An electronic signature was used to authenticate this note.

## 2022-04-04 ENCOUNTER — TELEPHONE (OUTPATIENT)
Dept: FAMILY MEDICINE CLINIC | Age: 47
End: 2022-04-04

## 2022-04-04 DIAGNOSIS — K59.04 CHRONIC IDIOPATHIC CONSTIPATION: Primary | ICD-10-CM

## 2022-04-04 RX ORDER — POLYETHYLENE GLYCOL 3350 17 G/17G
17 POWDER, FOR SOLUTION ORAL DAILY PRN
Qty: 238 G | Refills: 3 | Status: SHIPPED | OUTPATIENT
Start: 2022-04-04

## 2022-04-04 NOTE — TELEPHONE ENCOUNTER
INSURANCE DENIEDPsyllium (METAMUCIL FIBER) 51.7 % PACK   IS THERE SOMETHING ELSE THAT CAN BE CALLED IN TO HER PHARMACY PLEASE ADVISE

## 2022-04-04 NOTE — TELEPHONE ENCOUNTER
I changed it to MiraLAX, if that is not covered either then patient needs to go on prescription medications.

## 2022-07-13 ENCOUNTER — OFFICE VISIT (OUTPATIENT)
Dept: SURGERY | Age: 47
End: 2022-07-13
Payer: COMMERCIAL

## 2022-07-13 VITALS
OXYGEN SATURATION: 100 % | WEIGHT: 171 LBS | BODY MASS INDEX: 32.28 KG/M2 | HEART RATE: 90 BPM | HEIGHT: 61 IN | RESPIRATION RATE: 16 BRPM

## 2022-07-13 DIAGNOSIS — K59.09 OTHER CONSTIPATION: Primary | ICD-10-CM

## 2022-07-13 PROCEDURE — 99213 OFFICE O/P EST LOW 20 MIN: CPT | Performed by: PLASTIC SURGERY

## 2022-07-13 PROCEDURE — G8427 DOCREV CUR MEDS BY ELIG CLIN: HCPCS | Performed by: PLASTIC SURGERY

## 2022-07-13 PROCEDURE — G8417 CALC BMI ABV UP PARAM F/U: HCPCS | Performed by: PLASTIC SURGERY

## 2022-07-13 PROCEDURE — 1036F TOBACCO NON-USER: CPT | Performed by: PLASTIC SURGERY

## 2022-07-13 NOTE — PROGRESS NOTES
[address] [facility]  [facility] [id number]       Office Progress Note     Chief Complaint   Patient presents with    Post-Op Check     scar revision abdomen DOS 2/17/22        HPI:   Cesar Mcclellan is a 50 y.o. female who presents for follow-up. Patient initially underwent a panniculectomy and abdominoplasty on 10/19/2021. Then patient underwent a scar revision and liposuction on 2/17/2022. Patient is here for evaluation and treatment patient is doing very well. Patient is only complaining of constipation. Medications:     Current Outpatient Medications   Medication Sig Dispense Refill    polyethylene glycol (MIRALAX) 17 GM/SCOOP powder Take 17 g by mouth daily as needed (constipation) 238 g 3    Psyllium (METAMUCIL FIBER) 51.7 % PACK Take 1 packet everyday 3 each 3    lisinopril (PRINIVIL;ZESTRIL) 40 MG tablet take 1 tablet by mouth once daily 90 tablet 1    furosemide (LASIX) 20 MG tablet Take 1 tablet by mouth daily as needed (pedal edema) Take 20 mg by mouth daily as needed 60 tablet 1    triamterene-hydroCHLOROthiazide (MAXZIDE-25) 37.5-25 MG per tablet take 1 tablet by mouth once daily 90 tablet 1    hydrOXYzine (ATARAX) 25 MG tablet take 1 to 2 tablets by mouth once daily if needed maximum daily dose of 2      fluticasone (FLONASE) 50 MCG/ACT nasal spray 2 sprays by Nasal route daily 16 g 0    lidocaine (LMX) 4 % cream Apply topically BID as needed for pain 1 each 2    aspirin 81 MG EC tablet Take 81 mg by mouth daily      ARIPiprazole (ABILIFY) 20 MG tablet take 1 tablet by mouth every morning  0    busPIRone (BUSPAR) 15 MG tablet take 1 tablet by mouth twice a day  0    benztropine (COGENTIN) 2 MG tablet take 1 tablet by mouth twice a day if needed  0    gabapentin (NEURONTIN) 300 MG capsule Take 1 capsule by mouth 3 times daily for 10 days.  (Patient not taking: Reported on 3/29/2022) 30 capsule 0     No current facility-administered medications for this visit. Allergies:      Allergies   Allergen Reactions    Celebrex [Celecoxib] Shortness Of Breath and Swelling    Pcn [Penicillins]      c/o yeast infection     Trazodone And Nefazodone      Suicidal thoughts        Past Medical History:   Diagnosis Date    Bell's palsy     Bipolar 1 disorder, depressed ([facility] [address] 2018    GERD (gastroesophageal reflux disease)     HTN (hypertension)     Iron (Fe) deficiency anemia     Obesity     Post traumatic stress disorder     Post-nasal drip     Seasonal allergies      Past Surgical History:   Procedure Laterality Date    ABDOMINAL SURGERY  2022    ABDOMINAL LIPECTOMY SUCTION WITH SCAR REVISION AND MONS LIFT     ABDOMINOPLASTY N/A 2022    ABDOMINAL LIPECTOMY SUCTION WITH SCAR REVISION AND MONS LIFT performed by Jose Hirsch MD at [address] [place]    LIPECTOMY N/A 10/19/2021    PANNICULECTOMY/ABDOMINOPLASTY, TAP BLOCK, INCISIONAL WOUND VAC, BIOPATCH performed by Jose Hirsch MD at [place] [address] History     Socioeconomic History    Marital status:      Spouse name: Not on file    Number of children: Not on file    Years of education: Not on file    Highest education level: Not on file   Occupational History    Not on file   Tobacco Use    Smoking status: Former Smoker     Quit date: 1995     Years since quittin.5    Smokeless tobacco: Never Used   Vaping Use    Vaping Use: Never used   Substance and Sexual Activity    Alcohol use: Yes     Comment: occational wine    Drug use: No    Sexual activity: Yes     Partners: Male     Birth control/protection: Surgical     Comment:     Other Topics Concern    Not on file   Social History Narrative    Not on file     Social Determinants of Health     Financial Resource Strain: Low Risk     Difficulty of Paying Living Expenses: Not hard at all   Food Insecurity: No Food Insecurity    Worried About [address] [facility] in the Last Year: Never true    Ran Out of Food in the Last Year: Never true   Transportation Needs: No Transportation Needs    Lack of Transportation (Medical): No    Lack of Transportation (Non-Medical): No   Physical Activity:     Days of Exercise per Week: Not on file    Minutes of Exercise per Session: Not on file   Stress:     Feeling of Stress : Not on file   Social Connections:     Frequency of Communication with Friends and Family: Not on file    Frequency of Social Gatherings with Friends and Family: Not on file    Attends Muslim Services: Not on file    Active Member of [employer] or Organizations: Not on file    Attends Club or Organization Meetings: Not on file    Marital Status: Not on file   Intimate Partner Violence:     Fear of Current or Ex-Partner: Not on file    Emotionally Abused: Not on file    Physically Abused: Not on file    Sexually Abused: Not on file   Housing Stability: Unknown    Unable to Pay for Housing in the Last Year: No    Number of Places Lived in the Last Year: Not on file    Unstable Housing in the Last Year: No     Family History   Problem Relation Age of Onset    High Blood Pressure Mother     Coronary Art Dis Mother     Thyroid Disease Mother     Depression Mother     High Blood Pressure Father     Thyroid Disease Father     Depression Father      Review of Systems:     Constitutional: Negative for fever, chills, fatigue and unexpected weight change. HENT: Negative for hearing loss, sore throat and facial swelling. Eyes: Negative for pain and discharge. Respiratory: Negative for cough and shortness of breath. Patient has seasonal allergies. Cardiovascular: Patient has a history of hypertension. .   Gastrointestinal: Patient has history of GERD. Skin: Negative for pallor and rash. Neurological: Patient has a history of Bell's palsy. Hematological: Patient has iron deficiency anemia. Psychiatric/Behavioral: Negative for behavioral problems. Patient has bipolar disorder. Patient has posttraumatic stress syndrome. Physical Exam:   Pulse 90   Resp 16   Ht 5' 1\" (1.549 m)   Wt 171 lb (77.6 kg)   SpO2 100%   BMI 32.31 kg/m²    Body mass index is 32.31 kg/m². Physical Exam   Constitutional: Oriented to person, place, and time. Appears well-developed and well-nourished. No distress. HEENT: Normocephalic and atraumatic. External ears within normal limits. Extraocular muscles are intact. Conjunctivae were anicteric. Pulmonary/Chest: Effort normal. No respiratory distress. Neurological: Alert and oriented to person, place, and time. Skin: Well-healed. Abdomen soft nontender. Patient has some distention. Extremities:  Moves all extremities. Abdomen:  Soft. Psychiatric: Normal mood and affect. Behavior is normal.    Labs:   @LASTLABM(1m)@    Imaging:   No results found. Impression/Plan:      Diagnosis Orders   1. Other constipation  Sol Leal MD, Gastroenterology, [place]       Plan:  Follow-up with me in 3 months. I will refer to GI for further evaluation of the constipation.         Electronically signed by:  Gordon Rodgers M.D.   7/13/2022

## 2022-10-03 DIAGNOSIS — I10 PRIMARY HYPERTENSION: ICD-10-CM

## 2022-10-03 RX ORDER — LISINOPRIL 40 MG/1
TABLET ORAL
Qty: 90 TABLET | Refills: 1 | OUTPATIENT
Start: 2022-10-03

## 2022-10-05 DIAGNOSIS — I10 PRIMARY HYPERTENSION: ICD-10-CM

## 2022-10-05 RX ORDER — LISINOPRIL 40 MG/1
TABLET ORAL
Qty: 90 TABLET | Refills: 0 | Status: SHIPPED | OUTPATIENT
Start: 2022-10-05

## 2022-10-07 RX ORDER — LISINOPRIL 40 MG/1
TABLET ORAL
Qty: 90 TABLET | Refills: 0 | OUTPATIENT
Start: 2022-10-07

## 2022-11-03 ENCOUNTER — HOSPITAL ENCOUNTER (EMERGENCY)
Age: 47
Discharge: HOME | End: 2022-11-03
Attending: EMERGENCY MEDICINE
Payer: COMMERCIAL

## 2022-11-03 VITALS
HEART RATE: 83 BPM | DIASTOLIC BLOOD PRESSURE: 98 MMHG | BODY MASS INDEX: 29.64 KG/M2 | OXYGEN SATURATION: 98 % | TEMPERATURE: 98.4 F | RESPIRATION RATE: 18 BRPM | WEIGHT: 157 LBS | SYSTOLIC BLOOD PRESSURE: 157 MMHG | HEIGHT: 61 IN

## 2022-11-03 DIAGNOSIS — M79.604 PAIN OF RIGHT LOWER EXTREMITY: Primary | ICD-10-CM

## 2022-11-03 LAB — D-DIMER QUANTITATIVE: 5.57 MG/L FEU (ref 0–0.59)

## 2022-11-03 PROCEDURE — 36415 COLL VENOUS BLD VENIPUNCTURE: CPT

## 2022-11-03 PROCEDURE — 99284 EMERGENCY DEPT VISIT MOD MDM: CPT

## 2022-11-03 PROCEDURE — 85379 FIBRIN DEGRADATION QUANT: CPT

## 2022-11-03 PROCEDURE — 6370000000 HC RX 637 (ALT 250 FOR IP): Performed by: EMERGENCY MEDICINE

## 2022-11-03 PROCEDURE — 93970 EXTREMITY STUDY: CPT

## 2022-11-03 RX ORDER — ACETAMINOPHEN 325 MG/1
650 TABLET ORAL ONCE
Status: COMPLETED | OUTPATIENT
Start: 2022-11-03 | End: 2022-11-03

## 2022-11-03 RX ORDER — CYCLOBENZAPRINE HCL 10 MG
10 TABLET ORAL NIGHTLY PRN
Qty: 25 TABLET | Refills: 0 | Status: SHIPPED | OUTPATIENT
Start: 2022-11-03 | End: 2022-11-13

## 2022-11-03 RX ORDER — HYDROCODONE BITARTRATE AND ACETAMINOPHEN 5; 325 MG/1; MG/1
1 TABLET ORAL EVERY 6 HOURS PRN
Qty: 12 TABLET | Refills: 0 | Status: SHIPPED | OUTPATIENT
Start: 2022-11-03 | End: 2022-11-06

## 2022-11-03 RX ADMIN — ACETAMINOPHEN 650 MG: 325 TABLET, FILM COATED ORAL at 19:28

## 2022-11-03 ASSESSMENT — PAIN SCALES - GENERAL
PAINLEVEL_OUTOF10: 10
PAINLEVEL_OUTOF10: 10

## 2022-11-03 ASSESSMENT — ENCOUNTER SYMPTOMS
ABDOMINAL PAIN: 0
CHEST TIGHTNESS: 0
EYE PAIN: 0
SHORTNESS OF BREATH: 0
ABDOMINAL DISTENTION: 0
BACK PAIN: 0
FACIAL SWELLING: 0
EYE DISCHARGE: 0

## 2022-11-03 ASSESSMENT — PAIN - FUNCTIONAL ASSESSMENT: PAIN_FUNCTIONAL_ASSESSMENT: 0-10

## 2022-11-03 NOTE — ED PROVIDER NOTES
EMERGENCY DEPARTMENT ENCOUNTER    Pt Name: Iker Guevara  MRN: [id number]  [place] 1975  Date of evaluation: 11/3/22  CHIEF COMPLAINT       Chief Complaint   Patient presents with    Leg Pain     X 1 month. Pt states R thigh pain. Pt also c/o L thigh numbess     HISTORY OF PRESENT ILLNESS   HPI  The patient is a 49-year-old female who presented to the emergency department secondary to right leg and thigh pain. Patient states for the last 3 years she has had pain and numbness in her left thigh, and she was eval by her primary care doctor and told her that her clothes were too tight. Over the last several days she has had pain in her right thigh recent travel via car to [place]. No previous history of DVT no calf pain. Patient denied any trauma or injury but stated that she possibly did not stretch properly prior to jogging. Patient denied hemoptysis or immobility. Patient denied chest pain, shortness of breath, nausea, vomiting, fevers or chills    REVIEW OF SYSTEMS     Review of Systems   Constitutional:  Negative for chills, diaphoresis and fever. HENT:  Negative for congestion, ear pain and facial swelling. Eyes:  Negative for pain, discharge and visual disturbance. Respiratory:  Negative for chest tightness and shortness of breath. Cardiovascular:  Negative for chest pain and palpitations. Gastrointestinal:  Negative for abdominal distention and abdominal pain. Genitourinary:  Negative for difficulty urinating and flank pain. Musculoskeletal:  Negative for back pain. Skin:  Negative for wound. Neurological:  Negative for dizziness, light-headedness and headaches.    PASTMEDICAL HISTORY     Past Medical History:   Diagnosis Date    Bell's palsy     [facility]    GERD (gastroesophageal reflux disease)     HTN (hypertension)     Iron (Fe) deficiency anemia     Obesity     Post traumatic stress disorder     Post-nasal drip     Seasonal allergies SURGICAL HISTORY       Past Surgical History:   Procedure Laterality Date    ABDOMEN SURGERY  02/17/2022    ABDOMINAL LIPECTOMY SUCTION WITH SCAR REVISION AND MONS LIFT     ABDOMINOPLASTY N/A 2/17/2022    ABDOMINAL LIPECTOMY SUCTION WITH SCAR REVISION AND MONS LIFT performed by Chilango Hayward MD at [address]      Twice    LIPECTOMY N/A 10/19/2021    PANNICULECTOMY/ABDOMINOPLASTY, TAP BLOCK, INCISIONAL WOUND VAC, BIOPATCH performed by Chilango Hayward MD at [address]       Discharge Medication List as of 11/3/2022 10:51 PM        CONTINUE these medications which have NOT CHANGED    Details   lisinopril (PRINIVIL;ZESTRIL) 40 MG tablet take 1 tablet by mouth once daily, Disp-90 tablet, R-0Normal      polyethylene glycol (MIRALAX) 17 GM/SCOOP powder Take 17 g by mouth daily as needed (constipation), Disp-238 g, R-3Normal      Psyllium (METAMUCIL FIBER) 51.7 % PACK Take 1 packet everyday, Disp-3 each, R-3Normal      furosemide (LASIX) 20 MG tablet Take 1 tablet by mouth daily as needed (pedal edema) Take 20 mg by mouth daily as needed, Disp-60 tablet, R-1Normal      triamterene-hydroCHLOROthiazide (MAXZIDE-25) 37.5-25 MG per tablet take 1 tablet by mouth once daily, Disp-90 tablet, R-1PATIENT DUE FOR APPOINTMENTNormal      hydrOXYzine (ATARAX) 25 MG tablet take 1 to 2 tablets by mouth once daily if needed maximum daily dose of 2Historical Med      gabapentin (NEURONTIN) 300 MG capsule Take 1 capsule by mouth 3 times daily for 10 days. , Disp-30 capsule, R-0Normal      fluticasone (FLONASE) 50 MCG/ACT nasal spray 2 sprays by Nasal route daily, Disp-16 g, R-0Normal      lidocaine (LMX) 4 % cream Apply topically BID as needed for pain, Disp-1 each, R-2, Normal      aspirin 81 MG EC tablet Take 81 mg by mouth dailyHistorical Med      ARIPiprazole (ABILIFY) 20 MG tablet take 1 tablet by mouth every morning, R-0Historical Med      busPIRone (BUSPAR) 15 MG tablet take 1 tablet by mouth twice a day, R-0Historical Med      benztropine (COGENTIN) 2 MG tablet take 1 tablet by mouth twice a day if needed, R-0Historical Med           ALLERGIES     is allergic to celebrex [celecoxib], pcn [penicillins], and trazodone and nefazodone. FAMILY HISTORY     She indicated that the status of her mother is unknown. She indicated that the status of her father is unknown. SOCIAL HISTORY       Social History     Tobacco Use    Smoking status: Former     Types: Cigarettes     Quit date: 1995     Years since quittin.8    Smokeless tobacco: Never   Vaping Use    Vaping Use: Never used   Substance Use Topics    Alcohol use: Yes     Comment: occational wine    Drug use: No     PHYSICAL EXAM     INITIAL VITALS: BP (!) 157/98   Pulse 83   Temp 98.4 °F (36.9 °C)   Resp 18   Ht 5' 1\" (1.549 m)   Wt 157 lb (71.2 kg)   LMP  (LMP Unknown)   SpO2 98%   BMI 29.66 kg/m²    Physical Exam  Vitals and nursing note reviewed. Constitutional:       General: She is not in acute distress. Appearance: She is well-developed. She is not diaphoretic. HENT:      Head: Normocephalic and atraumatic. Eyes:      Pupils: Pupils are equal, round, and reactive to light. Cardiovascular:      Rate and Rhythm: Normal rate and regular rhythm. Pulmonary:      Effort: Pulmonary effort is normal.      Breath sounds: Normal breath sounds. Abdominal:      General: Bowel sounds are normal.      Palpations: Abdomen is soft. Musculoskeletal:         General: Normal range of motion. Cervical back: Normal range of motion and neck supple. Skin:     General: Skin is warm. Capillary Refill: Capillary refill takes less than 2 seconds. Neurological:      Mental Status: She is alert and oriented to person, place, and time.        MEDICAL DECISION MAKING:   The patient is a 25-year-old female who presented to the emergency department  secondary to bilateral leg pain. No evidence of infection. Orders for D-dimer. Pending imaging patient received Tylenol. Wells DVT Criteria:  []YES  []NO :History of previous DVT (If yes 1 point)  []YES  []NO :Active cancer treatment ongoing or within the previous six months or palliative (If yes 1 point)  []YES  []NO :Paralysis, paresis, or recent plaster immobilization of the lower extremities  (If yes 1 point)  []YES  []NO :Recently bedridden for more than three days, or major surgery within twelve weeks (If yes 1 point)  [x]YES  []NO :Localized tenderness along the distribution of the deep venous system (If yes 1 point)  []YES  []NO :Entire leg swollen (If yes 1 point)  []YES  []NO :Calf swelling by more than 3 cm when compared to the asymptomatic leg measured 10 cm below tibial tuberosity (If yes 1 point)  []YES  []NO :Pitting edema in the symptomatic leg (If yes 1 point)  []YES  []NO :Collateral superficial veins (nonvaricose) (If yes 1 point)  []YES  []NO :Alternative diagnosis as likely or more likely than that of deep venous thrombosis (-2 if yes)    Wells DVT Score Criteria Below TOTAL =  1    If Wells score is 2 or less, then d-dimer testing is recommended. Elevated D-dimer orders for bilateral ultrasound. Delay in ultrasound tech, to the emergency department but currently in route. Patient will be endorsed to Dr. Ha Diop pending ultrasound results.           HEART SCORE:                                                                                                                                                                                                                                                                                                                                                                                                                                                  All patient's question's and concerns were answered prior to disposition and patient and/or family expressed understanding and agreement of treatment plan. CRITICAL CARE:              NIH STROKE SCALE:            PROCEDURES:    Procedures    DIAGNOSTIC RESULTS   EKG:All EKG's are interpreted by the Emergency Department Physician who either signs or Co-signs this chart in the absence of a cardiologist.        RADIOLOGY:All plain film, CT, MRI, and formal ultrasound images (except ED bedside ultrasound) are read by the radiologist, see reports below, unless otherwisenoted in MDM or here. VL Lower Extremity Bilateral Venous Duplex   Final Result        LABS: All lab results were reviewed by myself, and all abnormals are listed below. Labs Reviewed   D-DIMER, QUANTITATIVE - Abnormal; Notable for the following components:       Result Value    D-Dimer, Quant 5.57 (*)     All other components within normal limits       EMERGENCY DEPARTMENTCOURSE:         Vitals:    Vitals:    11/03/22 1839 11/03/22 1841   BP: (!) 157/98    Pulse: 83    Resp: 18    Temp:  98.4 °F (36.9 °C)   SpO2: 98%    Weight: 157 lb (71.2 kg)    Height: 5' 1\" (1.549 m)        The patient was given the following medications while in the emergency department:  Orders Placed This Encounter   Medications    acetaminophen (TYLENOL) tablet 650 mg    HYDROcodone-acetaminophen (NORCO) 5-325 MG per tablet     Sig: Take 1 tablet by mouth every 6 hours as needed for Pain for up to 3 days. Intended supply: 3 days. Take lowest dose possible to manage pain     Dispense:  12 tablet     Refill:  0    cyclobenzaprine (FLEXERIL) 10 MG tablet     Sig: Take 1 tablet by mouth nightly as needed for Muscle spasms     Dispense:  25 tablet     Refill:  0     CONSULTS:  None    FINAL IMPRESSION      1.  Pain of right lower extremity          DISPOSITION/PLAN   DISPOSITION Decision To Discharge 11/03/2022 09:32:24 PM      PATIENT REFERRED TO:  Higinio Lomeli MD  [facility]  [address] [phone]        DISCHARGE MEDICATIONS:  Discharge Medication List as of 11/3/2022 10:51 PM        START taking these medications    Details   HYDROcodone-acetaminophen (NORCO) 5-325 MG per tablet Take 1 tablet by mouth every 6 hours as needed for Pain for up to 3 days. Intended supply: 3 days. Take lowest dose possible to manage pain, Disp-12 tablet, R-0Print      cyclobenzaprine (FLEXERIL) 10 MG tablet Take 1 tablet by mouth nightly as needed for Muscle spasms, Disp-25 tablet, [id number]           Pamela Wilson MD  Attending Emergency Physician      The care is provided during an unprecedented national emergency due to the novel coronavirus, COVID 19. This note was created with the assistance of a speech-recognition program. While intending to generate a document that actually reflects the content of the visit, no guarantees can be provided that every mistake has been identified and corrected by editing.     Ciaran Palomares MD  03/78/98 [id number]

## 2022-11-04 NOTE — ED NOTES
Pt presents to the er c/o left thigh and right  Side  thigh pain       Kamini Anglin RN  11/03/22 2168

## 2022-11-04 NOTE — ED PROVIDER NOTES
[address] [place] [address] ED  Emergency Department  Emergency Medicine     Care of Rehan Mcginnis was assumed from previous provider and is being seen for Leg Pain (X 1 month. Pt states R thigh pain. Pt also c/o L thigh numbess)  . The patient's initial evaluation and plan have been discussed with the prior provider who initially evaluated the patient. EMERGENCY DEPARTMENT COURSE / MEDICAL DECISION MAKING:       MEDICATIONS GIVEN:  Orders Placed This Encounter   Medications    acetaminophen (TYLENOL) tablet 650 mg    HYDROcodone-acetaminophen (NORCO) 5-325 MG per tablet     Sig: Take 1 tablet by mouth every 6 hours as needed for Pain for up to 3 days. Intended supply: 3 days. Take lowest dose possible to manage pain     Dispense:  12 tablet     Refill:  0    cyclobenzaprine (FLEXERIL) 10 MG tablet     Sig: Take 1 tablet by mouth nightly as needed for Muscle spasms     Dispense:  25 tablet     Refill:  0       LABS / RADIOLOGY:     Labs Reviewed   D-DIMER, QUANTITATIVE - Abnormal; Notable for the following components:       Result Value    D-Dimer, Quant 5.57 (*)     All other components within normal limits       No results found. RECENT VITALS:     Temp: 98.4 °F (36.9 °C),  Heart Rate: 83, Resp: 18, BP: (!) 157/98, SpO2: 98 %         This patient is a 50 y.o. Female with right lower extremity pain. Awaiting ultrasound rule out DVT, elevated D-dimer. Ultrasound negative. Pain control. Follow-up with PCP     FINAL IMPRESSION:     1.  Pain of right lower extremity        DISPOSITION:         DISPOSITION:  [x]  Discharge   []  Transfer -    []  Admission -     []  Against Medical Advice   []  Eloped   FOLLOW-UP: Jeanette Zamora MD  [address] [facility] [address]: Discharge Medication List as of 11/3/2022 10:51 PM        START taking these medications    Details   HYDROcodone-acetaminophen (NORCO) 5-325 MG per tablet Take 1 tablet by mouth every 6 hours as needed for Pain for up to 3 days. Intended supply: 3 days.  Take lowest dose possible to manage pain, Disp-12 tablet, R-0Sagar      cyclobenzaprine (FLEXERIL) 10 MG tablet Take 1 tablet by mouth nightly as needed for Muscle spasms, Disp-25 tablet, R-0Print                Ney Kwon DO  Emergency Medicine       Ney Kwon DO  11/04/22 9806

## 2022-11-08 ENCOUNTER — TELEPHONE (OUTPATIENT)
Dept: ORTHOPEDIC SURGERY | Age: 47
End: 2022-11-08

## 2022-11-08 NOTE — TELEPHONE ENCOUNTER
ED referral for Pain of right lower extremityPain of right lower extremity    LVM to schedule appt.  With Dr. Gerardo West

## 2022-11-14 NOTE — PROGRESS NOTES
[address]  DR. MICHELLE MENDOZA  [address]  [place]      Date of Visit:  2022  Patient Name: Kristofer Ocampo   Patient :  1975     CHIEF COMPLAINT:     Kristofer Ocampo is a 50 y.o. female who presents today for an general visit to be evaluated for the following condition(s):  Chief Complaint   Patient presents with    New Patient    Lower Back Pain     Approx started 1mo ago. REVIEW OF SYSTEM      Review of Systems   Constitutional:  Negative for chills and fever. HENT:  Negative for congestion and sore throat. Eyes:  Negative for discharge. Respiratory:  Negative for chest tightness and shortness of breath. Cardiovascular:  Negative for chest pain and palpitations. Gastrointestinal:  Negative for abdominal pain. Musculoskeletal:  Positive for back pain. HISTORY OF PRESENT ILLNESS     47F PMH HTN, Raynaud's, GERD, constipation, Bell's palsy, seasonal allergies, iron def anemia, obesity s/p gastric surgery, bipolar 1 d/o, PTSD, insomnia, MDD here for establishment of care. Has had low back pain for the last month. Pain is localized to right SI. She does have intermittent shooting pain into the right leg. She denies any red flag symptoms including fever, chills, weight loss, urinary incontinence, or numbness of the lower extremities. She has taken hydrocodone and flexeril in the ER, which has provided some relief. She has done PT in the past for her limb length discrepancy, although this did not help int he past. When she was in the ER, she had an elevated D-dimer but her leg US was negative.      REVIEWED INFORMATION      Allergies   Allergen Reactions    Celebrex [Celecoxib] Shortness Of Breath and Swelling    Pcn [Penicillins]      c/o yeast infection     Trazodone And Nefazodone      Suicidal thoughts       Patient Active Problem List   Diagnosis    Seasonal allergies    HTN (hypertension)    Iron (Fe) deficiency anemia    Obesity    GERD (gastroesophageal reflux disease)    Bell's palsy    Nasal congestion    Raynaud's phenomenon without gangrene    History of low transverse  section    Bipolar 1 disorder, depressed (HCC)    Post traumatic stress disorder    Primary insomnia    Major depressive disorder, recurrent episode, mild (HCC)    Chronic pain of right knee    Neck pain on right side    History of sinus surgery    Episodic overuse of medication    S/P bilateral breast reduction    S/P gastric surgery    Chronic idiopathic constipation    Absolute anemia       Past Medical History:   Diagnosis Date    Bell's palsy     Bipolar 1 disorder, depressed ([facility] 2018    GERD (gastroesophageal reflux disease)     HTN (hypertension)     Iron (Fe) deficiency anemia     Obesity     Post traumatic stress disorder     Post-nasal drip     Seasonal allergies        Past Surgical History:   Procedure Laterality Date    ABDOMEN SURGERY  2022    ABDOMINAL LIPECTOMY SUCTION WITH SCAR REVISION AND MONS LIFT     ABDOMINOPLASTY N/A 2022    ABDOMINAL LIPECTOMY SUCTION WITH SCAR REVISION AND MONS LIFT performed by Sandrita Cervantes MD at [address]      Twice    LIPECTOMY N/A 10/19/2021    PANNICULECTOMY/ABDOMINOPLASTY, TAP BLOCK, INCISIONAL WOUND VAC, BIOPATCH performed by Sandrita Cervantes MD at [address] [place] [address] History     Socioeconomic History    Marital status:      Spouse name: None    Number of children: None    Years of education: None    Highest education level: None   Tobacco Use    Smoking status: Former     Types: Cigarettes     Quit date: 1995     Years since quittin.8    Smokeless tobacco: Never   Vaping Use    Vaping Use: Never used   Substance and Sexual Activity    Alcohol use: Yes     Comment: occational wine    Drug use: No    Sexual activity: Yes     Partners: Male Birth control/protection: Surgical     Comment: TL 1998     Social Determinants of Health     Financial Resource Strain: Low Risk     Difficulty of Paying Living Expenses: Not hard at all   Food Insecurity: No Food Insecurity    Worried About Running Out of Food in the Last Year: Never true    Ran Out of Food in the Last Year: Never true   Transportation Needs: No Transportation Needs    Lack of Transportation (Medical): No    Lack of Transportation (Non-Medical): No   Housing Stability: Unknown    Unable to Pay for Housing in the Last Year: No    Unstable Housing in the Last Year: No        Family History   Problem Relation Age of Onset    High Blood Pressure Mother     Coronary Art Dis Mother     Thyroid Disease Mother     Depression Mother     High Blood Pressure Father     Thyroid Disease Father     Depression Father        PHYSICAL EXAM     /78   Pulse 92   Ht 5' 0.5\" (1.537 m)   Wt 179 lb (81.2 kg)   LMP  (LMP Unknown)   SpO2 98%   BMI 34.38 kg/m²    Physical Exam  Constitutional:       Appearance: Normal appearance. HENT:      Head: Atraumatic. Cardiovascular:      Rate and Rhythm: Normal rate and regular rhythm. Heart sounds: No murmur heard. No friction rub. No gallop. Pulmonary:      Effort: Pulmonary effort is normal. No respiratory distress. Breath sounds: Normal breath sounds. Musculoskeletal:      Comments: Pain to palpation along right SI joint. Pain with right-sided DAVID. Neurological:      Mental Status: She is alert. Psychiatric:         Mood and Affect: Mood normal.       ASSESSMENT/PLAN     1. Chronic right-sided low back pain with right-sided sciatica  Suspect SI joint dysfunction vs sciatica, will get XR hip to evaluate further and refer to PT. Will start pred burst in addition to the NorCo and flexeril she was discharged with in the ER. F/u in 1 month. - [facility] [place]  - XR HIP RIGHT (2-3 VIEWS);  Future  - predniSONE (DELTASONE) 20 MG tablet; Take 1 tablet by mouth 2 times daily for 5 days  Dispense: 10 tablet; Refill: 0    2. Elevated d-dimer  She is concerned about her d-dimer being elevated, although she had a normal LE US in the ER. Will recheck. - D-Dimer, Quantitative; Future    Return in about 1 month (around 12/17/2022) for f/u back pain.     COMMUNICATION:       Electronically signed by Carly Arce MD on 11/17/2022 at 4:46 PM

## 2022-11-16 ENCOUNTER — OFFICE VISIT (OUTPATIENT)
Dept: SURGERY | Age: 47
End: 2022-11-16
Payer: COMMERCIAL

## 2022-11-16 VITALS — BODY MASS INDEX: 29.66 KG/M2 | HEIGHT: 61 IN

## 2022-11-16 DIAGNOSIS — Z98.890 STATUS POST ABDOMINOPLASTY: Primary | ICD-10-CM

## 2022-11-16 PROCEDURE — G8417 CALC BMI ABV UP PARAM F/U: HCPCS | Performed by: PLASTIC SURGERY

## 2022-11-16 PROCEDURE — 1036F TOBACCO NON-USER: CPT | Performed by: PLASTIC SURGERY

## 2022-11-16 PROCEDURE — G8484 FLU IMMUNIZE NO ADMIN: HCPCS | Performed by: PLASTIC SURGERY

## 2022-11-16 PROCEDURE — G8427 DOCREV CUR MEDS BY ELIG CLIN: HCPCS | Performed by: PLASTIC SURGERY

## 2022-11-16 PROCEDURE — 99213 OFFICE O/P EST LOW 20 MIN: CPT | Performed by: PLASTIC SURGERY

## 2022-11-16 NOTE — PROGRESS NOTES
[address] [facility]  [facility] [id number]       Office Progress Note     Chief Complaint   Patient presents with    Follow-up     /ABDOMINAL LIPECTOMY SUCTION WITH SCAR REVISION 2/17/22        HPI:   Carla Jameson is a 50 y.o. female who presents for follow-up. Patient initially underwent a panniculectomy and abdominoplasty on 10/19/2021. Then patient underwent a scar revision and liposuction on 2/17/2022. Patient is here for evaluation and treatment patient is doing very well. Patient is only complaining of constipation. Medications:     Current Outpatient Medications   Medication Sig Dispense Refill    lisinopril (PRINIVIL;ZESTRIL) 40 MG tablet take 1 tablet by mouth once daily 90 tablet 0    Psyllium (METAMUCIL FIBER) 51.7 % PACK Take 1 packet everyday 3 each 3    furosemide (LASIX) 20 MG tablet Take 1 tablet by mouth daily as needed (pedal edema) Take 20 mg by mouth daily as needed 60 tablet 1    triamterene-hydroCHLOROthiazide (MAXZIDE-25) 37.5-25 MG per tablet take 1 tablet by mouth once daily 90 tablet 1    hydrOXYzine (ATARAX) 25 MG tablet take 1 to 2 tablets by mouth once daily if needed maximum daily dose of 2      fluticasone (FLONASE) 50 MCG/ACT nasal spray 2 sprays by Nasal route daily 16 g 0    aspirin 81 MG EC tablet Take 81 mg by mouth daily      ARIPiprazole (ABILIFY) 20 MG tablet take 1 tablet by mouth every morning  0    busPIRone (BUSPAR) 15 MG tablet take 1 tablet by mouth twice a day  0    benztropine (COGENTIN) 2 MG tablet take 1 tablet by mouth twice a day if needed  0    polyethylene glycol (MIRALAX) 17 GM/SCOOP powder Take 17 g by mouth daily as needed (constipation) (Patient not taking: Reported on 11/16/2022) 238 g 3    gabapentin (NEURONTIN) 300 MG capsule Take 1 capsule by mouth 3 times daily for 10 days.  (Patient not taking: No sig reported) 30 capsule 0    lidocaine (LMX) 4 % cream Apply topically BID as needed for pain (Patient not taking: Reported on 2022) 1 each 2     No current facility-administered medications for this visit. Allergies:      Allergies   Allergen Reactions    Celebrex [Celecoxib] Shortness Of Breath and Swelling    Pcn [Penicillins]      c/o yeast infection     Trazodone And Nefazodone      Suicidal thoughts        Past Medical History:   Diagnosis Date    Bell's palsy     [facility]    GERD (gastroesophageal reflux disease)     HTN (hypertension)     Iron (Fe) deficiency anemia     Obesity     Post traumatic stress disorder     Post-nasal drip     Seasonal allergies      Past Surgical History:   Procedure Laterality Date    ABDOMEN SURGERY  2022    ABDOMINAL LIPECTOMY SUCTION WITH SCAR REVISION AND MONS LIFT     ABDOMINOPLASTY N/A 2022    ABDOMINAL LIPECTOMY SUCTION WITH SCAR REVISION AND MONS LIFT performed by Michelle Gregorio MD at [address]      Twice    LIPECTOMY N/A 10/19/2021    PANNICULECTOMY/ABDOMINOPLASTY, TAP BLOCK, INCISIONAL WOUND VAC, BIOPATCH performed by Michelle Gregorio MD at [address] [place] [address] Place History     Socioeconomic History    Marital status:      Spouse name: Not on file    Number of children: Not on file    Years of education: Not on file    Highest education level: Not on file   Occupational History    Not on file   Tobacco Use    Smoking status: Former     Types: Cigarettes     Quit date: 1995     Years since quittin.8    Smokeless tobacco: Never   Vaping Use    Vaping Use: Never used   Substance and Sexual Activity    Alcohol use: Yes     Comment: occational wine    Drug use: No    Sexual activity: Yes     Partners: Male     Birth control/protection: Surgical     Comment:     Other Topics Concern    Not on file   Social History Narrative    Not on file     Social Determinants of Health Financial Resource Strain: Low Risk     Difficulty of Paying Living Expenses: Not hard at all   Food Insecurity: No Food Insecurity    Worried About Running Out of Food in the Last Year: Never true    Ran Out of Food in the Last Year: Never true   Transportation Needs: No Transportation Needs    Lack of Transportation (Medical): No    Lack of Transportation (Non-Medical): No   Physical Activity: Not on file   Stress: Not on file   Social Connections: Not on file   Intimate Partner Violence: Not on file   Housing Stability: Unknown    Unable to Pay for Housing in the Last Year: No    Number of Places Lived in the Last Year: Not on file    Unstable Housing in the Last Year: No     Family History   Problem Relation Age of Onset    High Blood Pressure Mother     Coronary Art Dis Mother     Thyroid Disease Mother     Depression Mother     High Blood Pressure Father     Thyroid Disease Father     Depression Father      Review of Systems:     Constitutional: Negative for fever, chills, fatigue and unexpected weight change. HENT: Negative for hearing loss, sore throat and facial swelling. Eyes: Negative for pain and discharge. Respiratory: Negative for cough and shortness of breath. Patient has seasonal allergies. Cardiovascular: Patient has a history of hypertension. .   Gastrointestinal: Patient has history of GERD. Skin: Negative for pallor and rash. Neurological: Patient has a history of Bell's palsy. Hematological: Patient has iron deficiency anemia. Psychiatric/Behavioral: Negative for behavioral problems. Patient has bipolar disorder. Patient has posttraumatic stress syndrome. Physical Exam:   Ht 5' 1\" (1.549 m)   LMP  (LMP Unknown)   BMI 29.66 kg/m²    Body mass index is 29.66 kg/m². Physical Exam   Constitutional: Oriented to person, place, and time. Appears well-developed and well-nourished. No distress. HEENT: Normocephalic and atraumatic. External ears within normal limits. Extraocular muscles are intact. Conjunctivae were anicteric. Pulmonary/Chest: Effort normal. No respiratory distress. Neurological: Alert and oriented to person, place, and time. Skin: Well-healed. Patient is doing very well. Abdomen soft nontender. Patient has some distention. Extremities:  Moves all extremities. Abdomen:  Soft. Psychiatric: Normal mood and affect. Behavior is normal.    Labs:   @LASTLABM(1m)@    Imaging:   No results found. Impression/Plan:      Diagnosis Orders   1. Status post abdominoplasty              Plan:  Follow-up with me in 3 months. Has had some weight gains. Patient may continue diet and exercise.         Electronically signed by:  Marlin Campos M.D.   11/16/2022

## 2022-11-17 ENCOUNTER — OFFICE VISIT (OUTPATIENT)
Dept: FAMILY MEDICINE CLINIC | Age: 47
End: 2022-11-17
Payer: COMMERCIAL

## 2022-11-17 ENCOUNTER — HOSPITAL ENCOUNTER (OUTPATIENT)
Dept: GENERAL RADIOLOGY | Age: 47
Discharge: HOME | End: 2022-11-19
Payer: COMMERCIAL

## 2022-11-17 ENCOUNTER — HOSPITAL ENCOUNTER (OUTPATIENT)
Age: 47
Discharge: HOME | End: 2022-11-19
Payer: COMMERCIAL

## 2022-11-17 VITALS
SYSTOLIC BLOOD PRESSURE: 136 MMHG | BODY MASS INDEX: 33.79 KG/M2 | OXYGEN SATURATION: 98 % | HEART RATE: 92 BPM | WEIGHT: 179 LBS | DIASTOLIC BLOOD PRESSURE: 78 MMHG | HEIGHT: 61 IN

## 2022-11-17 DIAGNOSIS — R79.89 ELEVATED D-DIMER: ICD-10-CM

## 2022-11-17 DIAGNOSIS — M54.41 CHRONIC RIGHT-SIDED LOW BACK PAIN WITH RIGHT-SIDED SCIATICA: Primary | ICD-10-CM

## 2022-11-17 DIAGNOSIS — G89.29 CHRONIC RIGHT-SIDED LOW BACK PAIN WITH RIGHT-SIDED SCIATICA: Primary | ICD-10-CM

## 2022-11-17 DIAGNOSIS — G89.29 CHRONIC RIGHT-SIDED LOW BACK PAIN WITH RIGHT-SIDED SCIATICA: ICD-10-CM

## 2022-11-17 DIAGNOSIS — M54.41 CHRONIC RIGHT-SIDED LOW BACK PAIN WITH RIGHT-SIDED SCIATICA: ICD-10-CM

## 2022-11-17 PROCEDURE — 73502 X-RAY EXAM HIP UNI 2-3 VIEWS: CPT

## 2022-11-17 PROCEDURE — G8484 FLU IMMUNIZE NO ADMIN: HCPCS | Performed by: STUDENT IN AN ORGANIZED HEALTH CARE EDUCATION/TRAINING PROGRAM

## 2022-11-17 PROCEDURE — G8427 DOCREV CUR MEDS BY ELIG CLIN: HCPCS | Performed by: STUDENT IN AN ORGANIZED HEALTH CARE EDUCATION/TRAINING PROGRAM

## 2022-11-17 PROCEDURE — 99203 OFFICE O/P NEW LOW 30 MIN: CPT | Performed by: STUDENT IN AN ORGANIZED HEALTH CARE EDUCATION/TRAINING PROGRAM

## 2022-11-17 PROCEDURE — 3074F SYST BP LT 130 MM HG: CPT | Performed by: STUDENT IN AN ORGANIZED HEALTH CARE EDUCATION/TRAINING PROGRAM

## 2022-11-17 PROCEDURE — G8417 CALC BMI ABV UP PARAM F/U: HCPCS | Performed by: STUDENT IN AN ORGANIZED HEALTH CARE EDUCATION/TRAINING PROGRAM

## 2022-11-17 PROCEDURE — 1036F TOBACCO NON-USER: CPT | Performed by: STUDENT IN AN ORGANIZED HEALTH CARE EDUCATION/TRAINING PROGRAM

## 2022-11-17 PROCEDURE — 3078F DIAST BP <80 MM HG: CPT | Performed by: STUDENT IN AN ORGANIZED HEALTH CARE EDUCATION/TRAINING PROGRAM

## 2022-11-17 RX ORDER — PREDNISONE 20 MG/1
20 TABLET ORAL 2 TIMES DAILY
Qty: 10 TABLET | Refills: 0 | Status: SHIPPED | OUTPATIENT
Start: 2022-11-17 | End: 2022-11-17

## 2022-11-17 RX ORDER — PREDNISONE 20 MG/1
20 TABLET ORAL 2 TIMES DAILY
Qty: 10 TABLET | Refills: 0 | Status: SHIPPED | OUTPATIENT
Start: 2022-11-17 | End: 2022-11-22

## 2022-11-17 ASSESSMENT — ENCOUNTER SYMPTOMS
SORE THROAT: 0
EYE DISCHARGE: 0
SHORTNESS OF BREATH: 0
CHEST TIGHTNESS: 0
ABDOMINAL PAIN: 0
BACK PAIN: 1

## 2022-12-25 DIAGNOSIS — I10 PRIMARY HYPERTENSION: ICD-10-CM

## 2022-12-27 RX ORDER — TRIAMTERENE AND HYDROCHLOROTHIAZIDE 37.5; 25 MG/1; MG/1
TABLET ORAL
Qty: 90 TABLET | Refills: 1 | OUTPATIENT
Start: 2022-12-27

## 2023-01-12 DIAGNOSIS — I10 PRIMARY HYPERTENSION: ICD-10-CM

## 2023-01-12 RX ORDER — LISINOPRIL 40 MG/1
TABLET ORAL
Qty: 90 TABLET | Refills: 0 | Status: SHIPPED | OUTPATIENT
Start: 2023-01-12

## 2023-01-12 RX ORDER — TRIAMTERENE AND HYDROCHLOROTHIAZIDE 37.5; 25 MG/1; MG/1
TABLET ORAL
Qty: 90 TABLET | Refills: 1 | Status: SHIPPED | OUTPATIENT
Start: 2023-01-12

## 2023-02-13 ENCOUNTER — OFFICE VISIT (OUTPATIENT)
Dept: ORTHOPEDIC SURGERY | Age: 48
End: 2023-02-13

## 2023-02-13 VITALS — BODY MASS INDEX: 36.06 KG/M2 | WEIGHT: 191 LBS | HEIGHT: 61 IN

## 2023-02-13 DIAGNOSIS — M43.06 LUMBAR SPONDYLOLYSIS: Primary | ICD-10-CM

## 2023-02-13 RX ORDER — HYDROCODONE BITARTRATE AND ACETAMINOPHEN 5; 325 MG/1; MG/1
1 TABLET ORAL EVERY 6 HOURS PRN
Qty: 28 TABLET | Refills: 0 | Status: SHIPPED | OUTPATIENT
Start: 2023-02-13 | End: 2023-02-20

## 2023-02-13 NOTE — PROGRESS NOTES
Chief Complaint   Patient presents with    New Patient     RT HIP PAIN since OCT/2022 : Patient bent over from the waist to tie shoe, and felt a sharp pain in buttock area and outer hip/ feels a pulling in groin

## 2023-02-13 NOTE — PROGRESS NOTES
This 56-year-old patient is seen here complaining of pain in the right hip area. In about September or October area she was bending over to tie a loose shoelace and felt sudden sharp pain in the groin. The patient used to walk and jog outside regularly for physical activity to keep her weight down. She had undergone surgery in the past.    Once this started she could not do weight and has gained weight. She is now started doing indoor treadmill and she says she is getting a little better. She describes the pain in the buttock as well as in the groin area. She has been using Tylenol arthritis and various aspirin and also has some lotion. In the past patient has had x-rays of the right hip thousand 17 and 2019 for complaint of pain in the hip. She also has had an MRI of her lumbar spine on 10/18/2019. This had shown degenerative changes in the lumbar spine and severe bilateral neural foraminal stenosis at the L4-5 level. Examination: Lumbar spine motion shows the patient does have pain on lateral flexion to left and right and on extension. This pain was not reproduced in the groin. In supine position hip examination showed excellent painless motion. Neurologically she is intact. Diagnosis: Lumbar spondylosis with right-sided sciatica. Treatment: I am starting her on physical therapy and have also given her pain medication. We will see her again in 3 weeks time. Her weight had gone up from 1 64-1 91 and I have told her that she has to get it down by the next visit.

## 2023-03-01 DIAGNOSIS — M43.06 LUMBAR SPONDYLOLYSIS: ICD-10-CM

## 2023-03-01 RX ORDER — HYDROCODONE BITARTRATE AND ACETAMINOPHEN 5; 325 MG/1; MG/1
1 TABLET ORAL EVERY 8 HOURS PRN
Qty: 21 TABLET | Refills: 0 | Status: SHIPPED | OUTPATIENT
Start: 2023-03-01 | End: 2023-03-08

## 2023-03-01 RX ORDER — HYDROCODONE BITARTRATE AND ACETAMINOPHEN 5; 325 MG/1; MG/1
1 TABLET ORAL EVERY 8 HOURS PRN
Qty: 21 TABLET | Refills: 0 | Status: CANCELLED | OUTPATIENT
Start: 2023-03-01 | End: 2023-03-08

## 2023-03-01 NOTE — TELEPHONE ENCOUNTER
Pt called in requesting a refill of pain medication, medication pended. Dx: Lumbar spondylosis with right-sided sciatica.

## 2023-03-14 ENCOUNTER — OFFICE VISIT (OUTPATIENT)
Dept: ORTHOPEDIC SURGERY | Age: 48
End: 2023-03-14

## 2023-03-14 VITALS — HEIGHT: 61 IN | BODY MASS INDEX: 34.55 KG/M2 | WEIGHT: 183 LBS

## 2023-03-14 DIAGNOSIS — M43.06 LUMBAR SPONDYLOLYSIS: ICD-10-CM

## 2023-03-14 DIAGNOSIS — G57.12 MERALGIA PARESTHETICA OF LEFT SIDE: Primary | ICD-10-CM

## 2023-03-14 NOTE — PROGRESS NOTES
This patient who has previously been seen for pain in the right hip and trochanteric area is seen here today again at this time complaining mainly of the left anterior thigh pain and paresthesia. She says the pain in the groin is on the right side is still persisting. Last time she was seen at told her that this was possibly related to her L4-5 root stenosis. She was referred for physical therapy. No symptoms in the right hip but started when she had bent down to tie her shoe lace. Today however she was more concerned about the burning sensation she has anteriorly on the left thigh. It keeps her awake at night. It is constant but sometimes get worse. The symptoms have started 12 years ago. At her last visit I had not noted that but she told me that she had mentioned it to me. She thought this may be related to the use of tight renal and sugar swelling. She is now wearing the lose 1 but it has not made much difference. The patient was overweight and had lost considerable weight and then started to gain again. Examination: Both hips have excellent motion. She had hypoesthesia over the anterior aspect of the left thigh. She is able to do straight leg raising. There was no hyperesthesia. Diagnosis: L4-5 root stenosis right side. #2 possible meralgia paresthetica on the left side. Treatment: Referring her for EMG nerve conduction studies for both the lower extremities. We will see her again after that.

## 2023-03-26 DIAGNOSIS — Z11.59 ENCOUNTER FOR HEPATITIS C SCREENING TEST FOR LOW RISK PATIENT: ICD-10-CM

## 2023-03-26 DIAGNOSIS — D50.8 OTHER IRON DEFICIENCY ANEMIA: ICD-10-CM

## 2023-03-26 DIAGNOSIS — Z13.1 SCREENING FOR DIABETES MELLITUS: Primary | ICD-10-CM

## 2023-03-26 DIAGNOSIS — Z13.220 SCREENING FOR LIPID DISORDERS: ICD-10-CM

## 2023-03-26 DIAGNOSIS — I10 PRIMARY HYPERTENSION: ICD-10-CM

## 2023-03-27 RX ORDER — LISINOPRIL 40 MG/1
TABLET ORAL
Qty: 30 TABLET | Refills: 0 | Status: SHIPPED | OUTPATIENT
Start: 2023-03-27 | End: 2023-04-24

## 2023-04-24 DIAGNOSIS — I10 PRIMARY HYPERTENSION: ICD-10-CM

## 2023-04-24 RX ORDER — LISINOPRIL 40 MG/1
TABLET ORAL
Qty: 30 TABLET | Refills: 0 | Status: SHIPPED | OUTPATIENT
Start: 2023-04-24 | End: 2023-05-22

## 2023-05-22 DIAGNOSIS — I10 PRIMARY HYPERTENSION: ICD-10-CM

## 2023-05-22 RX ORDER — LISINOPRIL 40 MG/1
TABLET ORAL
Qty: 30 TABLET | Refills: 0 | Status: SHIPPED | OUTPATIENT
Start: 2023-05-22

## 2023-06-22 DIAGNOSIS — I10 PRIMARY HYPERTENSION: ICD-10-CM

## 2023-06-22 RX ORDER — LISINOPRIL 40 MG/1
TABLET ORAL
Qty: 30 TABLET | Refills: 0 | Status: SHIPPED | OUTPATIENT
Start: 2023-06-22

## 2023-06-27 DIAGNOSIS — I10 PRIMARY HYPERTENSION: ICD-10-CM

## 2023-06-27 RX ORDER — TRIAMTERENE AND HYDROCHLOROTHIAZIDE 37.5; 25 MG/1; MG/1
TABLET ORAL
Qty: 90 TABLET | Refills: 1 | Status: SHIPPED | OUTPATIENT
Start: 2023-06-27

## 2023-10-28 ENCOUNTER — HOSPITAL ENCOUNTER (EMERGENCY)
Age: 48
Discharge: HOME | End: 2023-10-28
Attending: EMERGENCY MEDICINE
Payer: MEDICARE

## 2023-10-28 ENCOUNTER — APPOINTMENT (OUTPATIENT)
Dept: GENERAL RADIOLOGY | Age: 48
End: 2023-10-28
Payer: MEDICARE

## 2023-10-28 VITALS
BODY MASS INDEX: 31.6 KG/M2 | WEIGHT: 160.94 LBS | TEMPERATURE: 98.2 F | RESPIRATION RATE: 18 BRPM | HEART RATE: 82 BPM | DIASTOLIC BLOOD PRESSURE: 102 MMHG | HEIGHT: 60 IN | SYSTOLIC BLOOD PRESSURE: 178 MMHG | OXYGEN SATURATION: 97 %

## 2023-10-28 DIAGNOSIS — J06.9 VIRAL URI WITH COUGH: Primary | ICD-10-CM

## 2023-10-28 LAB
FLUAV RNA RESP QL NAA+PROBE: NOT DETECTED
FLUBV RNA RESP QL NAA+PROBE: NOT DETECTED
SARS-COV-2 RNA RESP QL NAA+PROBE: NOT DETECTED
SOURCE: NORMAL
SPECIMEN DESCRIPTION: NORMAL

## 2023-10-28 PROCEDURE — 71045 X-RAY EXAM CHEST 1 VIEW: CPT

## 2023-10-28 PROCEDURE — 99284 EMERGENCY DEPT VISIT MOD MDM: CPT

## 2023-10-28 PROCEDURE — 87636 SARSCOV2 & INF A&B AMP PRB: CPT

## 2024-02-26 NOTE — PROGRESS NOTES
[facility]  [facility]  DR. MICHELLE MENDOZA  [address], [address]  [place] [address]      Date of Visit:  2024  Patient Name: Lara Voss   Patient :  1975     CHIEF COMPLAINT:     Lara Voss is a 48 y.o. female who presents today for an general visit to be evaluated for the following condition(s):  Chief Complaint   Patient presents with    Annual Exam       REVIEW OF SYSTEM      Review of Systems   Constitutional:  Negative for chills and fever.   HENT:  Negative for congestion and sore throat.    Eyes:  Negative for discharge.   Respiratory:  Negative for chest tightness and shortness of breath.    Cardiovascular:  Negative for chest pain and palpitations.   Gastrointestinal:  Negative for abdominal pain.       HISTORY OF PRESENT ILLNESS     48F PMH HTN, Raynaud's, GERD, constipation, Bell's palsy, seasonal allergies, iron def anemia, obesity s/p gastric surgery, bipolar 1 d/o, PTSD, insomnia, MDD     She is due for annual physical. Her blood pressure in clinic is elevated x2, although she admits her blood pressure is much lower at home.     She developed a rash along her feet after taking prilosec. Not itchy, no pain, happened 2-3 days ago.     ----    Has had low back pain for the last month. Pain is localized to right SI. She does have intermittent shooting pain into the right leg. She denies any red flag symptoms including fever, chills, weight loss, urinary incontinence, or numbness of the lower extremities. She has taken hydrocodone and flexeril in the ER, which has provided some relief. She has done PT in the past for her limb length discrepancy, although this did not help int he past. When she was in the ER, she had an elevated D-dimer but her leg US was negative.     REVIEWED INFORMATION      Allergies   Allergen Reactions    Celebrex [Celecoxib] Shortness Of Breath and Swelling    Pcn [Penicillins]      c/o yeast infection

## 2024-02-27 ENCOUNTER — OFFICE VISIT (OUTPATIENT)
Dept: FAMILY MEDICINE CLINIC | Age: 49
End: 2024-02-27
Payer: MEDICARE

## 2024-02-27 VITALS
WEIGHT: 188 LBS | HEART RATE: 71 BPM | BODY MASS INDEX: 35.5 KG/M2 | DIASTOLIC BLOOD PRESSURE: 92 MMHG | OXYGEN SATURATION: 100 % | SYSTOLIC BLOOD PRESSURE: 134 MMHG | RESPIRATION RATE: 16 BRPM | HEIGHT: 61 IN

## 2024-02-27 DIAGNOSIS — Z12.31 ENCOUNTER FOR SCREENING MAMMOGRAM FOR MALIGNANT NEOPLASM OF BREAST: ICD-10-CM

## 2024-02-27 DIAGNOSIS — R73.09 ELEVATED GLUCOSE: ICD-10-CM

## 2024-02-27 DIAGNOSIS — Z13.1 SCREENING FOR DIABETES MELLITUS: ICD-10-CM

## 2024-02-27 DIAGNOSIS — I10 PRIMARY HYPERTENSION: ICD-10-CM

## 2024-02-27 DIAGNOSIS — Z98.890 S/P GASTRIC SURGERY: ICD-10-CM

## 2024-02-27 DIAGNOSIS — Z13.220 SCREENING FOR LIPID DISORDERS: ICD-10-CM

## 2024-02-27 DIAGNOSIS — Z12.11 SCREENING FOR COLON CANCER: ICD-10-CM

## 2024-02-27 DIAGNOSIS — D50.8 OTHER IRON DEFICIENCY ANEMIA: Primary | ICD-10-CM

## 2024-02-27 DIAGNOSIS — Z23 NEED FOR VACCINATION: ICD-10-CM

## 2024-02-27 DIAGNOSIS — Z11.59 ENCOUNTER FOR HEPATITIS C SCREENING TEST FOR LOW RISK PATIENT: ICD-10-CM

## 2024-02-27 DIAGNOSIS — R79.89 ELEVATED D-DIMER: ICD-10-CM

## 2024-02-27 PROCEDURE — 90471 IMMUNIZATION ADMIN: CPT | Performed by: STUDENT IN AN ORGANIZED HEALTH CARE EDUCATION/TRAINING PROGRAM

## 2024-02-27 PROCEDURE — 99396 PREV VISIT EST AGE 40-64: CPT | Performed by: STUDENT IN AN ORGANIZED HEALTH CARE EDUCATION/TRAINING PROGRAM

## 2024-02-27 PROCEDURE — G8484 FLU IMMUNIZE NO ADMIN: HCPCS | Performed by: STUDENT IN AN ORGANIZED HEALTH CARE EDUCATION/TRAINING PROGRAM

## 2024-02-27 PROCEDURE — 90715 TDAP VACCINE 7 YRS/> IM: CPT | Performed by: STUDENT IN AN ORGANIZED HEALTH CARE EDUCATION/TRAINING PROGRAM

## 2024-02-27 PROCEDURE — 3075F SYST BP GE 130 - 139MM HG: CPT | Performed by: STUDENT IN AN ORGANIZED HEALTH CARE EDUCATION/TRAINING PROGRAM

## 2024-02-27 PROCEDURE — 3080F DIAST BP >= 90 MM HG: CPT | Performed by: STUDENT IN AN ORGANIZED HEALTH CARE EDUCATION/TRAINING PROGRAM

## 2024-02-27 RX ORDER — LISINOPRIL 40 MG/1
40 TABLET ORAL DAILY
Qty: 90 TABLET | Refills: 1 | Status: SHIPPED | OUTPATIENT
Start: 2024-02-27

## 2024-02-27 SDOH — ECONOMIC STABILITY: FOOD INSECURITY: WITHIN THE PAST 12 MONTHS, YOU WORRIED THAT YOUR FOOD WOULD RUN OUT BEFORE YOU GOT MONEY TO BUY MORE.: SOMETIMES TRUE

## 2024-02-27 SDOH — ECONOMIC STABILITY: FOOD INSECURITY: WITHIN THE PAST 12 MONTHS, THE FOOD YOU BOUGHT JUST DIDN'T LAST AND YOU DIDN'T HAVE MONEY TO GET MORE.: SOMETIMES TRUE

## 2024-02-27 SDOH — ECONOMIC STABILITY: INCOME INSECURITY: HOW HARD IS IT FOR YOU TO PAY FOR THE VERY BASICS LIKE FOOD, HOUSING, MEDICAL CARE, AND HEATING?: HARD

## 2024-02-27 ASSESSMENT — PATIENT HEALTH QUESTIONNAIRE - PHQ9
6. FEELING BAD ABOUT YOURSELF - OR THAT YOU ARE A FAILURE OR HAVE LET YOURSELF OR YOUR FAMILY DOWN: 0
SUM OF ALL RESPONSES TO PHQ9 QUESTIONS 1 & 2: 0
4. FEELING TIRED OR HAVING LITTLE ENERGY: 0
SUM OF ALL RESPONSES TO PHQ QUESTIONS 1-9: 2
9. THOUGHTS THAT YOU WOULD BE BETTER OFF DEAD, OR OF HURTING YOURSELF: 0
8. MOVING OR SPEAKING SO SLOWLY THAT OTHER PEOPLE COULD HAVE NOTICED. OR THE OPPOSITE, BEING SO FIGETY OR RESTLESS THAT YOU HAVE BEEN MOVING AROUND A LOT MORE THAN USUAL: 0
7. TROUBLE CONCENTRATING ON THINGS, SUCH AS READING THE NEWSPAPER OR WATCHING TELEVISION: 0
SUM OF ALL RESPONSES TO PHQ QUESTIONS 1-9: 2
2. FEELING DOWN, DEPRESSED OR HOPELESS: 0
SUM OF ALL RESPONSES TO PHQ QUESTIONS 1-9: 2
5. POOR APPETITE OR OVEREATING: 1
SUM OF ALL RESPONSES TO PHQ QUESTIONS 1-9: 2
3. TROUBLE FALLING OR STAYING ASLEEP: 1
1. LITTLE INTEREST OR PLEASURE IN DOING THINGS: 0

## 2024-02-27 ASSESSMENT — ENCOUNTER SYMPTOMS
CHEST TIGHTNESS: 0
EYE DISCHARGE: 0
SORE THROAT: 0
ABDOMINAL PAIN: 0
SHORTNESS OF BREATH: 0

## 2024-06-19 ENCOUNTER — OFFICE VISIT (OUTPATIENT)
Dept: SURGERY | Age: 49
End: 2024-06-19
Payer: MEDICARE

## 2024-06-19 ENCOUNTER — HOSPITAL ENCOUNTER (OUTPATIENT)
Age: 49
Setting detail: SPECIMEN
Discharge: HOME | End: 2024-06-19

## 2024-06-19 VITALS
RESPIRATION RATE: 16 BRPM | HEIGHT: 61 IN | BODY MASS INDEX: 34.74 KG/M2 | DIASTOLIC BLOOD PRESSURE: 92 MMHG | HEART RATE: 81 BPM | OXYGEN SATURATION: 100 % | SYSTOLIC BLOOD PRESSURE: 131 MMHG | WEIGHT: 184 LBS

## 2024-06-19 DIAGNOSIS — Z01.818 PRE-OP TESTING: Primary | ICD-10-CM

## 2024-06-19 DIAGNOSIS — Z11.59 ENCOUNTER FOR HEPATITIS C SCREENING TEST FOR LOW RISK PATIENT: ICD-10-CM

## 2024-06-19 DIAGNOSIS — D50.8 OTHER IRON DEFICIENCY ANEMIA: ICD-10-CM

## 2024-06-19 DIAGNOSIS — E88.1 LIPODYSTROPHY: ICD-10-CM

## 2024-06-19 DIAGNOSIS — L90.5 PAINFUL SCAR: Primary | ICD-10-CM

## 2024-06-19 DIAGNOSIS — Z13.1 SCREENING FOR DIABETES MELLITUS: ICD-10-CM

## 2024-06-19 DIAGNOSIS — Z98.890 S/P GASTRIC SURGERY: ICD-10-CM

## 2024-06-19 DIAGNOSIS — R52 PAINFUL SCAR: Primary | ICD-10-CM

## 2024-06-19 DIAGNOSIS — Z01.818 PRE-OP TESTING: ICD-10-CM

## 2024-06-19 DIAGNOSIS — R73.09 ELEVATED GLUCOSE: ICD-10-CM

## 2024-06-19 DIAGNOSIS — R79.89 ELEVATED D-DIMER: ICD-10-CM

## 2024-06-19 DIAGNOSIS — Z13.220 SCREENING FOR LIPID DISORDERS: ICD-10-CM

## 2024-06-19 LAB
ALBUMIN SERPL-MCNC: 4.7 G/DL (ref 3.5–5.2)
ALBUMIN/GLOB SERPL: 1 {RATIO} (ref 1–2.5)
ALP SERPL-CCNC: 65 U/L (ref 35–104)
ALT SERPL-CCNC: 35 U/L (ref 10–35)
ANION GAP SERPL CALCULATED.3IONS-SCNC: 12 MMOL/L (ref 9–16)
AST SERPL-CCNC: 28 U/L (ref 10–35)
BILIRUB SERPL-MCNC: 0.5 MG/DL (ref 0–1.2)
BUN SERPL-MCNC: 17 MG/DL (ref 6–20)
CALCIUM SERPL-MCNC: 10.1 MG/DL (ref 8.6–10.4)
CHLORIDE SERPL-SCNC: 101 MMOL/L (ref 98–107)
CHOLEST SERPL-MCNC: 173 MG/DL (ref 0–199)
CHOLESTEROL/HDL RATIO: 3
CO2 SERPL-SCNC: 26 MMOL/L (ref 20–31)
CREAT SERPL-MCNC: 0.8 MG/DL (ref 0.5–0.9)
D DIMER PPP FEU-MCNC: 9.22 UG/ML FEU (ref 0–0.57)
ERYTHROCYTE [DISTWIDTH] IN BLOOD BY AUTOMATED COUNT: 14.6 % (ref 11.8–14.4)
EST. AVERAGE GLUCOSE BLD GHB EST-MCNC: 103 MG/DL
FOLATE SERPL-MCNC: >20 NG/ML (ref 4.8–24.2)
GFR, ESTIMATED: >90 ML/MIN/1.73M2
GLUCOSE SERPL-MCNC: 100 MG/DL (ref 74–99)
HBA1C MFR BLD: 5.2 % (ref 4–6)
HCT VFR BLD AUTO: 42 % (ref 36.3–47.1)
HCV AB SERPL QL IA: NONREACTIVE
HDLC SERPL-MCNC: 60 MG/DL
HGB BLD-MCNC: 13.6 G/DL (ref 11.9–15.1)
IRON SATN MFR SERPL: 23 % (ref 20–55)
IRON SERPL-MCNC: 77 UG/DL (ref 37–145)
LDLC SERPL CALC-MCNC: 104 MG/DL (ref 0–100)
MCH RBC QN AUTO: 31 PG (ref 25.2–33.5)
MCHC RBC AUTO-ENTMCNC: 32.4 G/DL (ref 28.4–34.8)
MCV RBC AUTO: 95.7 FL (ref 82.6–102.9)
NRBC BLD-RTO: 0 PER 100 WBC
PLATELET # BLD AUTO: 310 K/UL (ref 138–453)
PMV BLD AUTO: 10.3 FL (ref 8.1–13.5)
POTASSIUM SERPL-SCNC: 4.2 MMOL/L (ref 3.7–5.3)
PROT SERPL-MCNC: 8.3 G/DL (ref 6.6–8.7)
RBC # BLD AUTO: 4.39 M/UL (ref 3.95–5.11)
SODIUM SERPL-SCNC: 139 MMOL/L (ref 136–145)
TIBC SERPL-MCNC: 331 UG/DL (ref 250–450)
TRIGL SERPL-MCNC: 50 MG/DL
UNSATURATED IRON BINDING CAPACITY: 254 UG/DL (ref 112–347)
VIT B12 SERPL-MCNC: >2000 PG/ML (ref 232–1245)
VLDLC SERPL CALC-MCNC: 10 MG/DL
WBC OTHER # BLD: 6.4 K/UL (ref 3.5–11.3)

## 2024-06-19 PROCEDURE — 99213 OFFICE O/P EST LOW 20 MIN: CPT | Performed by: PLASTIC SURGERY

## 2024-06-19 PROCEDURE — 3075F SYST BP GE 130 - 139MM HG: CPT | Performed by: PLASTIC SURGERY

## 2024-06-19 PROCEDURE — G8417 CALC BMI ABV UP PARAM F/U: HCPCS | Performed by: PLASTIC SURGERY

## 2024-06-19 PROCEDURE — G8427 DOCREV CUR MEDS BY ELIG CLIN: HCPCS | Performed by: PLASTIC SURGERY

## 2024-06-19 PROCEDURE — 3080F DIAST BP >= 90 MM HG: CPT | Performed by: PLASTIC SURGERY

## 2024-06-19 PROCEDURE — 1036F TOBACCO NON-USER: CPT | Performed by: PLASTIC SURGERY

## 2024-06-19 PROCEDURE — 3017F COLORECTAL CA SCREEN DOC REV: CPT | Performed by: PLASTIC SURGERY

## 2024-06-19 NOTE — PROGRESS NOTES
liposuction, greater than 5000 cc's, may be advised to have postoperative monitoring and aftercare that involves overnight hospitalization.    Deep Venous Thrombosis, Cardiac and Pulmonary Complications- Surgery, especially longer procedures, may be associated with the formation of, or increase in, blood clots in the venous system.  Fat embolism syndrome occurs when fat droplets are trapped in the lungs.  This is a very rare and possibly fatal complication of suction assisted Lipectomy.  Pulmonary complications may occur secondarily to both blood clots (pulmonary emboli), fat deposits (fat emboli) or partial collapse of the lungs after general anesthesia.  Pulmonary and fat emboli can be life-threatening or fatal in some circumstances.  Inactivity and other conditions may increase the incidence of blood clots traveling to the lungs causing a major blood clot that may result in death.  It is important to discuss with your physician any past history of blood clots, swollen legs or the use of estrogen or birth control pills that may contribute to this condition.  Cardiac complications are a risk with any surgery and anesthesia, even in patients without symptoms.  If you experience shortness of breath, chest pains, or unusual heart beats, seek medical attention immediately.  Should any of these complications occur, you may require hospitalization and additional treatment.      Tumescent Liposuction-There is the possibility that large volumes of fluid containing dilute local anesthetic drugs and epinephrine that is injected into fatty deposits during surgery may contribute to fluid overload or systemic reaction to these medications.  Additional treatment including hospitalization may be necessary.    Unsatisfactory Result- Although good results are expected, there is no guarantee or warranty expressed or implied, on the results that may be obtained.  You may be disappointed with the results of liposuction surgery.  This

## 2024-06-20 DIAGNOSIS — R79.89 ELEVATED D-DIMER: Primary | ICD-10-CM

## 2024-06-24 LAB
3-OH-COTININE URINE: <50 NG/ML
ANABASINE URINE: <5 NG/ML
COTININE, URINE: <15 NG/ML
NICOTINE URINE: <15 NG/ML

## 2024-06-28 ENCOUNTER — TELEPHONE (OUTPATIENT)
Dept: SURGERY | Age: 49
End: 2024-06-28

## 2024-06-28 NOTE — PROGRESS NOTES
bright light in her peripheral vision of her left eye. She noticed it happened more frequently when she increased her biotin supplement. No pain in her vision. She is endorsing floaters as well.     She was noted to have an elevated D dimer from her recent labwork. She denies feeling SOB, chest pain, or having leg swelling/pain. She was noted to have an elevated D dimer during her labwork in , but didn't complete the repeat labwork until 1.5 years later to look into this.     ----    She is due for annual physical. Her blood pressure in clinic is elevated x2, although she admits her blood pressure is much lower at home.     She developed a rash along her feet after taking prilosec. Not itchy, no pain, happened 2-3 days ago.     ----    Has had low back pain for the last month. Pain is localized to right SI. She does have intermittent shooting pain into the right leg. She denies any red flag symptoms including fever, chills, weight loss, urinary incontinence, or numbness of the lower extremities. She has taken hydrocodone and flexeril in the ER, which has provided some relief. She has done PT in the past for her limb length discrepancy, although this did not help int he past. When she was in the ER, she had an elevated D-dimer but her leg US was negative.     REVIEWED INFORMATION      Allergies   Allergen Reactions    Celebrex [Celecoxib] Shortness Of Breath and Swelling    Pcn [Penicillins]      c/o yeast infection     Trazodone And Nefazodone      Suicidal thoughts       Patient Active Problem List   Diagnosis    Seasonal allergies    HTN (hypertension)    Iron (Fe) deficiency anemia    Obesity    GERD (gastroesophageal reflux disease)    Bell's palsy    Nasal congestion    Raynaud's phenomenon without gangrene    History of low transverse  section    Bipolar 1 disorder, depressed (HCC)    Post traumatic stress disorder    Primary insomnia    Major depressive disorder, recurrent episode, mild (HCC)

## 2024-07-01 ENCOUNTER — OFFICE VISIT (OUTPATIENT)
Dept: FAMILY MEDICINE CLINIC | Age: 49
End: 2024-07-01
Payer: MEDICARE

## 2024-07-01 ENCOUNTER — HOSPITAL ENCOUNTER (OUTPATIENT)
Age: 49
Discharge: HOME | End: 2024-07-01
Payer: MEDICARE

## 2024-07-01 VITALS
RESPIRATION RATE: 16 BRPM | TEMPERATURE: 97.3 F | WEIGHT: 180 LBS | DIASTOLIC BLOOD PRESSURE: 87 MMHG | BODY MASS INDEX: 33.99 KG/M2 | HEIGHT: 61 IN | HEART RATE: 76 BPM | SYSTOLIC BLOOD PRESSURE: 127 MMHG | OXYGEN SATURATION: 97 %

## 2024-07-01 DIAGNOSIS — R53.83 OTHER FATIGUE: ICD-10-CM

## 2024-07-01 DIAGNOSIS — K59.04 CHRONIC IDIOPATHIC CONSTIPATION: ICD-10-CM

## 2024-07-01 DIAGNOSIS — H43.392 VITREOUS FLOATERS OF LEFT EYE: Primary | ICD-10-CM

## 2024-07-01 DIAGNOSIS — J35.8 TONSIL STONE: ICD-10-CM

## 2024-07-01 DIAGNOSIS — R79.89 ELEVATED D-DIMER: ICD-10-CM

## 2024-07-01 DIAGNOSIS — J02.9 SORE THROAT: ICD-10-CM

## 2024-07-01 LAB
ALBUMIN SERPL-MCNC: 4.7 G/DL (ref 3.5–5.2)
ALBUMIN/GLOB SERPL: 1 {RATIO} (ref 1–2.5)
ALP SERPL-CCNC: 64 U/L (ref 35–104)
ALT SERPL-CCNC: 40 U/L (ref 10–35)
ANION GAP SERPL CALCULATED.3IONS-SCNC: 15 MMOL/L (ref 9–16)
AST SERPL-CCNC: 28 U/L (ref 10–35)
BASOPHILS # BLD: 0.04 K/UL (ref 0–0.2)
BASOPHILS NFR BLD: 1 % (ref 0–2)
BILIRUB SERPL-MCNC: 0.3 MG/DL (ref 0–1.2)
BUN SERPL-MCNC: 15 MG/DL (ref 6–20)
CALCIUM SERPL-MCNC: 9.8 MG/DL (ref 8.6–10.4)
CHLORIDE SERPL-SCNC: 102 MMOL/L (ref 98–107)
CO2 SERPL-SCNC: 25 MMOL/L (ref 20–31)
CREAT SERPL-MCNC: 0.8 MG/DL (ref 0.5–0.9)
D DIMER PPP FEU-MCNC: 0.6 UG/ML FEU
EOSINOPHIL # BLD: 0.03 K/UL (ref 0–0.44)
EOSINOPHILS RELATIVE PERCENT: 1 % (ref 1–4)
ERYTHROCYTE [DISTWIDTH] IN BLOOD BY AUTOMATED COUNT: 13.8 % (ref 11.8–14.4)
GFR, ESTIMATED: 84 ML/MIN/1.73M2
GLUCOSE SERPL-MCNC: 91 MG/DL (ref 74–99)
HCT VFR BLD AUTO: 43.1 % (ref 36.3–47.1)
HGB BLD-MCNC: 14.4 G/DL (ref 11.9–15.1)
HIV 1+2 AB+HIV1 P24 AG SERPL QL IA: NONREACTIVE
IMM GRANULOCYTES # BLD AUTO: <0.03 K/UL (ref 0–0.3)
IMM GRANULOCYTES NFR BLD: 0 %
LYMPHOCYTES NFR BLD: 1.55 K/UL (ref 1.1–3.7)
LYMPHOCYTES RELATIVE PERCENT: 29 % (ref 24–43)
MCH RBC QN AUTO: 31.3 PG (ref 25.2–33.5)
MCHC RBC AUTO-ENTMCNC: 33.4 G/DL (ref 28.4–34.8)
MCV RBC AUTO: 93.7 FL (ref 82.6–102.9)
MONOCYTES NFR BLD: 0.24 K/UL (ref 0.1–1.2)
MONOCYTES NFR BLD: 5 % (ref 3–12)
NEUTROPHILS NFR BLD: 64 % (ref 36–65)
NEUTS SEG NFR BLD: 3.52 K/UL (ref 1.5–8.1)
NRBC BLD-RTO: 0 PER 100 WBC
PLATELET # BLD AUTO: 313 K/UL (ref 138–453)
PMV BLD AUTO: 10 FL (ref 8.1–13.5)
POTASSIUM SERPL-SCNC: 3.7 MMOL/L (ref 3.7–5.3)
PROT SERPL-MCNC: 8.2 G/DL (ref 6.6–8.7)
RBC # BLD AUTO: 4.6 M/UL (ref 3.95–5.11)
S PYO AG THROAT QL: NORMAL
SODIUM SERPL-SCNC: 142 MMOL/L (ref 136–145)
TSH SERPL DL<=0.05 MIU/L-ACNC: 0.6 UIU/ML (ref 0.27–4.2)
VIT B12 SERPL-MCNC: 1971 PG/ML (ref 232–1245)
WBC OTHER # BLD: 5.4 K/UL (ref 3.5–11.3)

## 2024-07-01 PROCEDURE — 81291 MTHFR GENE: CPT

## 2024-07-01 PROCEDURE — 82607 VITAMIN B-12: CPT

## 2024-07-01 PROCEDURE — G8427 DOCREV CUR MEDS BY ELIG CLIN: HCPCS | Performed by: STUDENT IN AN ORGANIZED HEALTH CARE EDUCATION/TRAINING PROGRAM

## 2024-07-01 PROCEDURE — 85730 THROMBOPLASTIN TIME PARTIAL: CPT

## 2024-07-01 PROCEDURE — 3079F DIAST BP 80-89 MM HG: CPT | Performed by: STUDENT IN AN ORGANIZED HEALTH CARE EDUCATION/TRAINING PROGRAM

## 2024-07-01 PROCEDURE — G8417 CALC BMI ABV UP PARAM F/U: HCPCS | Performed by: STUDENT IN AN ORGANIZED HEALTH CARE EDUCATION/TRAINING PROGRAM

## 2024-07-01 PROCEDURE — 85302 CLOT INHIBIT PROT C ANTIGEN: CPT

## 2024-07-01 PROCEDURE — 86147 CARDIOLIPIN ANTIBODY EA IG: CPT

## 2024-07-01 PROCEDURE — 36415 COLL VENOUS BLD VENIPUNCTURE: CPT

## 2024-07-01 PROCEDURE — 85613 RUSSELL VIPER VENOM DILUTED: CPT

## 2024-07-01 PROCEDURE — 87389 HIV-1 AG W/HIV-1&-2 AB AG IA: CPT

## 2024-07-01 PROCEDURE — 85025 COMPLETE CBC W/AUTO DIFF WBC: CPT

## 2024-07-01 PROCEDURE — 3074F SYST BP LT 130 MM HG: CPT | Performed by: STUDENT IN AN ORGANIZED HEALTH CARE EDUCATION/TRAINING PROGRAM

## 2024-07-01 PROCEDURE — 3017F COLORECTAL CA SCREEN DOC REV: CPT | Performed by: STUDENT IN AN ORGANIZED HEALTH CARE EDUCATION/TRAINING PROGRAM

## 2024-07-01 PROCEDURE — 85379 FIBRIN DEGRADATION QUANT: CPT

## 2024-07-01 PROCEDURE — 80053 COMPREHEN METABOLIC PANEL: CPT

## 2024-07-01 PROCEDURE — 85610 PROTHROMBIN TIME: CPT

## 2024-07-01 PROCEDURE — 84443 ASSAY THYROID STIM HORMONE: CPT

## 2024-07-01 PROCEDURE — 87880 STREP A ASSAY W/OPTIC: CPT | Performed by: STUDENT IN AN ORGANIZED HEALTH CARE EDUCATION/TRAINING PROGRAM

## 2024-07-01 PROCEDURE — 99214 OFFICE O/P EST MOD 30 MIN: CPT | Performed by: STUDENT IN AN ORGANIZED HEALTH CARE EDUCATION/TRAINING PROGRAM

## 2024-07-01 PROCEDURE — 81240 F2 GENE: CPT

## 2024-07-01 PROCEDURE — 1036F TOBACCO NON-USER: CPT | Performed by: STUDENT IN AN ORGANIZED HEALTH CARE EDUCATION/TRAINING PROGRAM

## 2024-07-01 PROCEDURE — 81241 F5 GENE: CPT

## 2024-07-01 PROCEDURE — 85305 CLOT INHIBIT PROT S TOTAL: CPT

## 2024-07-01 PROCEDURE — 85300 ANTITHROMBIN III ACTIVITY: CPT

## 2024-07-01 RX ORDER — GLUCOSAMINE/D3/BOSWELLIA SERRA 1500MG-400
TABLET ORAL
COMMUNITY

## 2024-07-01 ASSESSMENT — ENCOUNTER SYMPTOMS: SORE THROAT: 1

## 2024-07-03 LAB
AT III ACT/NOR PPP CHRO: 121 % (ref 83–122)
CARDIOLIPIN IGA SER IA-ACNC: 5.4 APL (ref 0–14)
CARDIOLIPIN IGG SER IA-ACNC: 1.3 GPL (ref 0–10)
CARDIOLIPIN IGM SER IA-ACNC: 1.5 MPL (ref 0–10)
DILUTE RUSSELL VIPER VENOM TIME: NORMAL
INR PPP: 1
PARTIAL THROMBOPLASTIN TIME: 27.2 SEC (ref 21.3–31.3)
PROTHROMBIN TIME: 10.3 SEC (ref 9.4–12.6)

## 2024-07-04 LAB
PROT C AG ACT/NOR PPP IA: >95 % (ref 63–153)
PROT S AG ACT/NOR PPP IA: 156 % (ref 63–126)

## 2024-07-07 LAB
F2 C.20210G>A GENO BLD/T: NEGATIVE
F5 P.R506Q BLD/T QL: NEGATIVE
MTHFR INTERPRETATION: NORMAL
MTHFR MUTATION A1286C: NEGATIVE
MTHFR MUTATION C665T: NEGATIVE
SPECIMEN SOURCE: NORMAL
SPECIMEN SOURCE: NORMAL
SPECIMEN: NORMAL

## 2024-07-09 DIAGNOSIS — I10 PRIMARY HYPERTENSION: ICD-10-CM

## 2024-07-09 RX ORDER — TRIAMTERENE AND HYDROCHLOROTHIAZIDE 37.5; 25 MG/1; MG/1
1 TABLET ORAL DAILY
Qty: 90 TABLET | Refills: 1 | Status: SHIPPED | OUTPATIENT
Start: 2024-07-09

## 2024-07-10 ENCOUNTER — HOSPITAL ENCOUNTER (OUTPATIENT)
Dept: MAMMOGRAPHY | Age: 49
Discharge: HOME | End: 2024-07-12
Payer: MEDICARE

## 2024-07-10 VITALS — BODY MASS INDEX: 31.74 KG/M2 | WEIGHT: 168 LBS

## 2024-07-10 DIAGNOSIS — Z12.31 ENCOUNTER FOR SCREENING MAMMOGRAM FOR MALIGNANT NEOPLASM OF BREAST: ICD-10-CM

## 2024-07-10 PROCEDURE — 77063 BREAST TOMOSYNTHESIS BI: CPT

## 2024-07-19 LAB — NONINV COLON CA DNA+OCC BLD SCRN STL QL: NEGATIVE

## 2024-07-24 ENCOUNTER — TELEPHONE (OUTPATIENT)
Dept: FAMILY MEDICINE CLINIC | Age: 49
End: 2024-07-24

## 2024-07-24 NOTE — TELEPHONE ENCOUNTER
Letter was sent to the patient after multiple attempts were made to reach her to discuss her recent labs and she is calling back. I did go over all the results and she is following up with hem/onc.

## 2024-07-30 ENCOUNTER — TELEPHONE (OUTPATIENT)
Dept: ONCOLOGY | Age: 49
End: 2024-07-30

## 2024-07-30 ENCOUNTER — INITIAL CONSULT (OUTPATIENT)
Dept: ONCOLOGY | Age: 49
End: 2024-07-30
Payer: MEDICARE

## 2024-07-30 VITALS
HEART RATE: 71 BPM | TEMPERATURE: 97.9 F | DIASTOLIC BLOOD PRESSURE: 84 MMHG | WEIGHT: 184 LBS | SYSTOLIC BLOOD PRESSURE: 134 MMHG | RESPIRATION RATE: 16 BRPM | HEIGHT: 61 IN | BODY MASS INDEX: 34.74 KG/M2

## 2024-07-30 DIAGNOSIS — R79.89 ELEVATED D-DIMER: Primary | ICD-10-CM

## 2024-07-30 DIAGNOSIS — G51.0 BELL'S PALSY: ICD-10-CM

## 2024-07-30 PROCEDURE — 99202 OFFICE O/P NEW SF 15 MIN: CPT | Performed by: INTERNAL MEDICINE

## 2024-07-30 PROCEDURE — 99205 OFFICE O/P NEW HI 60 MIN: CPT | Performed by: INTERNAL MEDICINE

## 2024-07-30 PROCEDURE — G8417 CALC BMI ABV UP PARAM F/U: HCPCS | Performed by: INTERNAL MEDICINE

## 2024-07-30 PROCEDURE — 3079F DIAST BP 80-89 MM HG: CPT | Performed by: INTERNAL MEDICINE

## 2024-07-30 PROCEDURE — G8427 DOCREV CUR MEDS BY ELIG CLIN: HCPCS | Performed by: INTERNAL MEDICINE

## 2024-07-30 PROCEDURE — 3075F SYST BP GE 130 - 139MM HG: CPT | Performed by: INTERNAL MEDICINE

## 2024-07-30 RX ORDER — OXYMETAZOLINE HYDROCHLORIDE 0.05 G/100ML
2 SPRAY NASAL NIGHTLY
COMMUNITY

## 2024-07-30 NOTE — TELEPHONE ENCOUNTER
Hinduism HERE FOR CONSULT   Continue B Aspirin  D dimer test in 3-4 weeks  Call with results  RV PRN  TRIAGE TO WATCH FOR RESULT   FAX SENT TO [facility] TRIAGE AT [id number]   LABS PRINTED AND GIVEN ON EXIT   AVS PRINTED AND GIVEN ON EXIT

## 2024-08-06 NOTE — PROGRESS NOTES
_           Ms. Lara Voss is a very pleasant 49 y.o. female with history of multiple co morbidities as listed.  Patient is referred for evaluation and management of elevated d-dimer. Patient has no complainta ta the present time. She had leg pain in . D dimer was elevated. No DVT or PE. She was evaluated again recently. Repeated d-dimer was elevated. Hypercoagulability work up negative. Repeated d-dimer 24 is back to normal,   Patient has no history of VT or PE. No cardiac events . No TIA or CVA.  No other complaints.     PAST MEDICAL HISTORY: has a past medical history of Bell's palsy, Bipolar 1 disorder, depressed (HCC), GERD (gastroesophageal reflux disease), HTN (hypertension), Iron (Fe) deficiency anemia, Obesity, Post traumatic stress disorder, Post-nasal drip, and Seasonal allergies.    PAST SURGICAL HISTORY: has a past surgical history that includes  section; Breast reduction surgery; Nasal sinus surgery; lipectomy (N/A, 10/19/2021); Abdomen surgery (2022); and Abdominoplasty (N/A, 2022).     CURRENT MEDICATIONS:  has a current medication list which includes the following prescription(s): oxymetazoline, triamterene-hydrochlorothiazide, biotin, lisinopril, metamucil 4 in 1 fiber, furosemide, fluticasone, aspirin, aripiprazole, buspirone, and benztropine.    ALLERGIES:  is allergic to celebrex [celecoxib], pcn [penicillins], and trazodone and nefazodone.    FAMILY HISTORY: Negative for any hematological or oncological conditions.     SOCIAL HISTORY:  reports that she has quit smoking. Her smoking use included cigarettes. She has never been exposed to tobacco smoke. She has never used smokeless tobacco. She reports current alcohol use. She reports that she does not use drugs.    REVIEW OF SYSTEMS:     General: Positive for weakness and fatigue. No unanticipated weight loss or decreased

## 2024-10-16 RX ORDER — LISINOPRIL 40 MG/1
40 TABLET ORAL DAILY
Qty: 90 TABLET | Refills: 0 | Status: SHIPPED | OUTPATIENT
Start: 2024-10-16

## 2024-10-16 NOTE — TELEPHONE ENCOUNTER
Pt called needing a refill. I scheduled her to establish with ALVERTO Sands on 1/8/25. Pt last saw Dr. Birmingham on 7/1/24.

## 2024-11-14 NOTE — PLAN OF CARE
Department of Anesthesiology  Postprocedure Note    Patient: Kade Jade  MRN: [id number]  YOB: 1962  Date of evaluation: 11/13/2024    Procedure Summary       Date: 11/13/24 Room / Location: [facility]    Anesthesia Start: 1830 Anesthesia Stop: 1918    Procedure: CYSTOSCOPY LEFT URETERAL STENT INSERTION Diagnosis:       Abdominal pain, unspecified abdominal location      (Abdominal pain, unspecified abdominal location [R10.9])    Surgeons: Paulo Guillen MD Responsible Provider: Matt Santos MD    Anesthesia Type: MAC ASA Status: 3            Anesthesia Type: No value filed.    Jackelyn Phase I: Jackelyn Score: 6    Jackelyn Phase II:      Anesthesia Post Evaluation    Patient location during evaluation: PACU  Patient participation: complete - patient participated  Level of consciousness: awake and alert  Airway patency: patent  Nausea & Vomiting: no nausea and no vomiting  Cardiovascular status: hemodynamically stable  Respiratory status: acceptable  Hydration status: euvolemic  Pain management: adequate    No notable events documented.   Problem: Pain:  Goal: Pain level will decrease  Description: Pain level will decrease  Outcome: Ongoing     Problem: SAFETY  Goal: Free from accidental physical injury  Outcome: Ongoing     Problem: SKIN INTEGRITY  Goal: Skin integrity is maintained or improved  Outcome: Ongoing     Problem: KNOWLEDGE DEFICIT  Goal: Patient/S.O. demonstrates understanding of disease process, treatment plan, medications, and discharge instructions.   Outcome: Ongoing     Problem: DISCHARGE BARRIERS  Goal: Patient's continuum of care needs are met  Outcome: Ongoing

## 2024-12-11 ENCOUNTER — OFFICE VISIT (OUTPATIENT)
Dept: SURGERY | Age: 49
End: 2024-12-11
Payer: MEDICARE

## 2024-12-11 VITALS
DIASTOLIC BLOOD PRESSURE: 99 MMHG | HEART RATE: 65 BPM | BODY MASS INDEX: 37.11 KG/M2 | WEIGHT: 189 LBS | HEIGHT: 60 IN | SYSTOLIC BLOOD PRESSURE: 155 MMHG | RESPIRATION RATE: 18 BRPM

## 2024-12-11 DIAGNOSIS — E88.1 LIPODYSTROPHY: ICD-10-CM

## 2024-12-11 DIAGNOSIS — R52 PAINFUL SCAR: Primary | ICD-10-CM

## 2024-12-11 DIAGNOSIS — L90.5 PAINFUL SCAR: Primary | ICD-10-CM

## 2024-12-11 PROCEDURE — G8484 FLU IMMUNIZE NO ADMIN: HCPCS | Performed by: PLASTIC SURGERY

## 2024-12-11 PROCEDURE — 3080F DIAST BP >= 90 MM HG: CPT | Performed by: PLASTIC SURGERY

## 2024-12-11 PROCEDURE — 99213 OFFICE O/P EST LOW 20 MIN: CPT | Performed by: PLASTIC SURGERY

## 2024-12-11 PROCEDURE — 1036F TOBACCO NON-USER: CPT | Performed by: PLASTIC SURGERY

## 2024-12-11 PROCEDURE — 3077F SYST BP >= 140 MM HG: CPT | Performed by: PLASTIC SURGERY

## 2024-12-11 PROCEDURE — 3017F COLORECTAL CA SCREEN DOC REV: CPT | Performed by: PLASTIC SURGERY

## 2024-12-11 PROCEDURE — G8417 CALC BMI ABV UP PARAM F/U: HCPCS | Performed by: PLASTIC SURGERY

## 2024-12-11 PROCEDURE — G8427 DOCREV CUR MEDS BY ELIG CLIN: HCPCS | Performed by: PLASTIC SURGERY

## 2024-12-11 NOTE — PROGRESS NOTES
products (patch, gum, or nasal spray) are at a greater risk for significant surgical complications of skin dying, delayed healing, and additional scarring.  Individuals exposed to second-hand smoke are also at potential risk for similar complications attributable to nicotine exposure.  Additionally, smoking may have a significant negative effect on anesthesia and recovery from anesthesia, with coughing and possibly increased bleeding.  Individuals who are not exposed to tobacco smoke or nicotine-containing products have a significantly lower risk of this type of complication. This also may apply to secondhand smoke as well.    Mental Health Disorders and Elective Surgery- It is important that all patients seeking to undergo elective surgery have realistic expectations that focus on improvement rather than perfection.  Complications or less than satisfactory results are sometimes unavoidable, may require additional surgery and often are stressful.  Please openly discuss with your surgeon, prior to surgery, any history that you may have of significant emotional depression or mental health disorders.  Although many individuals may benefit psychologically from the results of elective surgery, effects on mental health cannot be accurately predicted.    Medications- There are many adverse reactions that occur as the result of taking over the counter, herbal, and/or prescription medications.  Be sure to check with your physician about any drug interactions that may exist with medications which you are already taking.  If you have an adverse reaction, stop the drugs immediately and call your plastic surgeon for further instructions.  If the reaction is severe, go immediately to the nearest emergency room.  When taking the prescribed pain medications after surgery, realize that they can affect your thought process and coordination.  Do not drive, do not operate complex equipment, do not make any important decisions, and do

## 2025-02-12 ENCOUNTER — TELEPHONE (OUTPATIENT)
Dept: SURGERY | Age: 50
End: 2025-02-12

## 2025-02-12 NOTE — TELEPHONE ENCOUNTER
Lara Voss YOB: 1975   5772     Telephone Encounter  Ph: [phone]        Reason of Calling:     Patient called in with questions regarding her upcoming surgery scheduled on 03/04/2024:     1) Clarifying the arrival/ start time of surgery, paperwork states arrival time being 07:00 AM and start time at 09:30 AM, but in surgery tab it states start time is at 11:00 AM. Clarification needed please.    2) She got her approval letter, and stated that the codes that got approved were 50471 and 44034. She is wanting to know is the abdomen included because the CPT code 52938 states it involves abdomen.       Patient Communication Outcome:      (NEEDS REVIEWED) Will need Elena to return her phone call to address questions        Ermelinda Alarcon MA

## 2025-02-13 NOTE — TELEPHONE ENCOUNTER
Spoke to patient, confirmed surgery date/time and arrival time for patients upcoming surgery. Explained to patient the CPT codes are approved for the liposuction on the flanks and back and the abdomen is not apart of that area. Writer explained a medical clearance is needed prior to patients surgery and explained a medical clearance was faxed to patients PCP office. Patient states she will call her PCP to see if she needs to make an appt for clearance. Writer will re-faxed clearance.

## 2025-02-24 ENCOUNTER — HOSPITAL ENCOUNTER (OUTPATIENT)
Dept: PREADMISSION TESTING | Age: 50
Discharge: HOME | End: 2025-02-28
Payer: MEDICARE

## 2025-02-24 VITALS
HEIGHT: 61 IN | RESPIRATION RATE: 16 BRPM | TEMPERATURE: 98.2 F | BODY MASS INDEX: 34.55 KG/M2 | SYSTOLIC BLOOD PRESSURE: 133 MMHG | WEIGHT: 183 LBS | DIASTOLIC BLOOD PRESSURE: 92 MMHG | OXYGEN SATURATION: 98 % | HEART RATE: 85 BPM

## 2025-02-24 DIAGNOSIS — Z01.818 PRE-OP TESTING: Primary | ICD-10-CM

## 2025-02-24 LAB
ANION GAP SERPL CALCULATED.3IONS-SCNC: 11 MMOL/L (ref 9–16)
BUN SERPL-MCNC: 20 MG/DL (ref 6–20)
CALCIUM SERPL-MCNC: 10.2 MG/DL (ref 8.6–10.4)
CHLORIDE SERPL-SCNC: 101 MMOL/L (ref 98–107)
CO2 SERPL-SCNC: 27 MMOL/L (ref 20–31)
CREAT SERPL-MCNC: 0.8 MG/DL (ref 0.6–0.9)
EKG ATRIAL RATE: 68 BPM
EKG P AXIS: 44 DEGREES
EKG P-R INTERVAL: 150 MS
EKG Q-T INTERVAL: 428 MS
EKG QRS DURATION: 88 MS
EKG QTC CALCULATION (BAZETT): 455 MS
EKG R AXIS: -17 DEGREES
EKG T AXIS: 27 DEGREES
EKG VENTRICULAR RATE: 68 BPM
ERYTHROCYTE [DISTWIDTH] IN BLOOD BY AUTOMATED COUNT: 13.9 % (ref 11.8–14.4)
GFR, ESTIMATED: 89 ML/MIN/1.73M2
GLUCOSE SERPL-MCNC: 80 MG/DL (ref 74–99)
HCT VFR BLD AUTO: 44.7 % (ref 36.3–47.1)
HGB BLD-MCNC: 14.3 G/DL (ref 11.9–15.1)
MCH RBC QN AUTO: 30.3 PG (ref 25.2–33.5)
MCHC RBC AUTO-ENTMCNC: 32 G/DL (ref 28.4–34.8)
MCV RBC AUTO: 94.7 FL (ref 82.6–102.9)
NRBC BLD-RTO: 0 PER 100 WBC
PLATELET # BLD AUTO: 338 K/UL (ref 138–453)
PMV BLD AUTO: 10.4 FL (ref 8.1–13.5)
POTASSIUM SERPL-SCNC: 3.8 MMOL/L (ref 3.7–5.3)
RBC # BLD AUTO: 4.72 M/UL (ref 3.95–5.11)
SODIUM SERPL-SCNC: 139 MMOL/L (ref 136–145)
WBC OTHER # BLD: 5.7 K/UL (ref 3.5–11.3)

## 2025-02-24 PROCEDURE — 80048 BASIC METABOLIC PNL TOTAL CA: CPT

## 2025-02-24 PROCEDURE — 85027 COMPLETE CBC AUTOMATED: CPT

## 2025-02-24 PROCEDURE — 36415 COLL VENOUS BLD VENIPUNCTURE: CPT

## 2025-02-24 PROCEDURE — G0480 DRUG TEST DEF 1-7 CLASSES: HCPCS

## 2025-02-24 PROCEDURE — 93010 ELECTROCARDIOGRAM REPORT: CPT | Performed by: INTERNAL MEDICINE

## 2025-02-24 PROCEDURE — 93005 ELECTROCARDIOGRAM TRACING: CPT | Performed by: ANESTHESIOLOGY

## 2025-02-24 RX ORDER — TRAZODONE HYDROCHLORIDE 100 MG/1
100 TABLET ORAL NIGHTLY
COMMUNITY

## 2025-02-24 ASSESSMENT — PAIN DESCRIPTION - ORIENTATION: ORIENTATION: LOWER

## 2025-02-24 ASSESSMENT — PAIN DESCRIPTION - FREQUENCY: FREQUENCY: CONTINUOUS

## 2025-02-24 ASSESSMENT — PAIN SCALES - GENERAL: PAINLEVEL_OUTOF10: 4

## 2025-02-24 ASSESSMENT — PAIN DESCRIPTION - PAIN TYPE: TYPE: CHRONIC PAIN

## 2025-02-24 ASSESSMENT — PAIN DESCRIPTION - LOCATION: LOCATION: BACK

## 2025-02-24 NOTE — H&P
tissue.       There are a variety of different techniques used by plastic surgeons for liposuction and care following surgery.  Liposuction may be performed under local or general anesthesia.  Tumescent liposuction technique involves the infiltration of fluid containing dilute local anesthetic and epinephrine into areas of fatty deposits.  This technique can reduce discomfort at the time of surgery, blood loss, and post-operative bruising.       Support garments and dressings are worn to control swelling and promote healing.  Your surgeon may recommend that you make arrangements to donate a unit of your own blood that would be used if a blood transfusion were necessary after surgery.     ALTERNATIVE TREATMENTS  Alternative forms of management consist of not treating the areas of fatty deposits.  Diet and exercise regimens may be of benefit in the overall reduction of excess body fat.  Direct removal of excess skin and fatty tissue may be necessary in addition to liposuction in some patients.  Risks and potential complications are associated with alternative surgical forms of treatment.     RISKS OF LIPOSUCTION SURGERY  Every surgical procedure involves a certain amount of risk and it is important that you understand these risks and the possible complications associated with them.  In addition, every procedure has limitations.  An individual's choice to undergo a surgical procedure is based on the comparison of the risk to potential benefit.  Although the majority of patients do not experience these complications, you should discuss each of them with your plastic surgeon to make sure you completely understand all possible consequences of liposuction.  Patient Selection- Individuals with poor skin tone, medical problems, obesity, or unrealistic expectations may not be candidates for liposuction.     Bleeding- It is possible, though unusual, to experience a bleeding episode during or after surgery.  Should

## 2025-02-24 NOTE — DISCHARGE INSTRUCTIONS
DAY OF SURGERY/PROCEDURE  GUIDELINES    As a patient at the [facility], you can expect quality medical and nursing care that is centered on your individual needs. It is our goal to make your surgical experience as comfortable and excellent as possible.  ________________________________________________________________________    The following instructions are general guidelines, if any information on this sheet is different from what your doctor has instructed you to do, please follow your doctor's instructions.    Please arrive on 3/4/2025 @ 0900      Enter through the hospital lobby, take the elevators to the second floor.  Check in at the surgery registration desk.    Upon arrival you will be taken to the pre-operative area to get ready for surgery, your family will stay in the waiting room and visit with you once you are ready for surgery. Due to special limitations please limit visitation to 1-2 members of your family at a time. When it is time for surgery your family will return to the waiting room.    Nothing to eat, drink, smoke, suck or chew after midnight (no water, gum, mints, cigarettes, cigars, pipes, snuff, chewing tobacco, etc.) or your surgery may be canceled.     Take a shower or bath the night before and morning of your surgery/procedure (chlorhexidine gluconate if directed) Do not apply any lotions.    Brush your teeth, but do not swallow any water    IN CASE OF ILLNESS - If you have a cold or flu symptoms (high fever, runny nose, sore throat, cough, etc.) rash, nausea, vomiting, loose stools, and/or recent contact with someone who has a contagious disease (chick pox, measles, etc.) please call your doctor before coming to the surgery center    Take a small sip of water with      If applicable bring your:  Inhaler (s)  Hearing aid(s)  Eyeglasses and Case (If you wear contacts they have to be removed before surgery, bring case and solution)  CPAP     DO NOT take anticoagulants (blood

## 2025-03-04 ENCOUNTER — APPOINTMENT (OUTPATIENT)
Dept: GENERAL RADIOLOGY | Age: 50
End: 2025-03-04
Attending: PLASTIC SURGERY
Payer: MEDICARE

## 2025-03-04 ENCOUNTER — ANESTHESIA EVENT (OUTPATIENT)
Dept: OPERATING ROOM | Age: 50
End: 2025-03-04
Payer: MEDICARE

## 2025-03-04 ENCOUNTER — HOSPITAL ENCOUNTER (OUTPATIENT)
Age: 50
Discharge: HOME | End: 2025-03-05
Attending: PLASTIC SURGERY | Admitting: PLASTIC SURGERY
Payer: MEDICARE

## 2025-03-04 ENCOUNTER — ANESTHESIA (OUTPATIENT)
Dept: OPERATING ROOM | Age: 50
End: 2025-03-04
Payer: MEDICARE

## 2025-03-04 DIAGNOSIS — R52 PAINFUL SCAR: ICD-10-CM

## 2025-03-04 DIAGNOSIS — E88.1 LIPODYSTROPHY: ICD-10-CM

## 2025-03-04 DIAGNOSIS — L90.5 PAINFUL SCAR: ICD-10-CM

## 2025-03-04 PROCEDURE — 88305 TISSUE EXAM BY PATHOLOGIST: CPT

## 2025-03-04 PROCEDURE — 13101 CMPLX RPR TRUNK 2.6-7.5 CM: CPT | Performed by: PLASTIC SURGERY

## 2025-03-04 PROCEDURE — 6360000002 HC RX W HCPCS: Performed by: ANESTHESIOLOGY

## 2025-03-04 PROCEDURE — 3600000002 HC SURGERY LEVEL 2 BASE: Performed by: PLASTIC SURGERY

## 2025-03-04 PROCEDURE — 13102 CMPLX RPR TRUNK ADDL 5CM/<: CPT | Performed by: PLASTIC SURGERY

## 2025-03-04 PROCEDURE — 2580000003 HC RX 258: Performed by: ANESTHESIOLOGY

## 2025-03-04 PROCEDURE — 2709999900 HC NON-CHARGEABLE SUPPLY: Performed by: PLASTIC SURGERY

## 2025-03-04 PROCEDURE — 6370000000 HC RX 637 (ALT 250 FOR IP): Performed by: PLASTIC SURGERY

## 2025-03-04 PROCEDURE — 3600000012 HC SURGERY LEVEL 2 ADDTL 15MIN: Performed by: PLASTIC SURGERY

## 2025-03-04 PROCEDURE — 71045 X-RAY EXAM CHEST 1 VIEW: CPT

## 2025-03-04 PROCEDURE — 7100000001 HC PACU RECOVERY - ADDTL 15 MIN: Performed by: PLASTIC SURGERY

## 2025-03-04 PROCEDURE — 15772 GRFG AUTOL FAT LIPO EA ADDL: CPT | Performed by: PLASTIC SURGERY

## 2025-03-04 PROCEDURE — 3700000001 HC ADD 15 MINUTES (ANESTHESIA): Performed by: PLASTIC SURGERY

## 2025-03-04 PROCEDURE — 6370000000 HC RX 637 (ALT 250 FOR IP): Performed by: ANESTHESIOLOGY

## 2025-03-04 PROCEDURE — 6360000002 HC RX W HCPCS: Performed by: PLASTIC SURGERY

## 2025-03-04 PROCEDURE — 6360000002 HC RX W HCPCS: Performed by: SPECIALIST

## 2025-03-04 PROCEDURE — 3700000000 HC ANESTHESIA ATTENDED CARE: Performed by: PLASTIC SURGERY

## 2025-03-04 PROCEDURE — 15771 GRFG AUTOL FAT LIPO 50 CC/<: CPT | Performed by: PLASTIC SURGERY

## 2025-03-04 PROCEDURE — 2720000010 HC SURG SUPPLY STERILE: Performed by: PLASTIC SURGERY

## 2025-03-04 PROCEDURE — 2500000003 HC RX 250 WO HCPCS: Performed by: SPECIALIST

## 2025-03-04 PROCEDURE — 7100000000 HC PACU RECOVERY - FIRST 15 MIN: Performed by: PLASTIC SURGERY

## 2025-03-04 RX ORDER — LIDOCAINE HYDROCHLORIDE 10 MG/ML
1 INJECTION, SOLUTION EPIDURAL; INFILTRATION; INTRACAUDAL; PERINEURAL
Status: DISCONTINUED | OUTPATIENT
Start: 2025-03-05 | End: 2025-03-04 | Stop reason: HOSPADM

## 2025-03-04 RX ORDER — SODIUM CHLORIDE 0.9 % (FLUSH) 0.9 %
5-40 SYRINGE (ML) INJECTION EVERY 12 HOURS SCHEDULED
Status: DISCONTINUED | OUTPATIENT
Start: 2025-03-04 | End: 2025-03-05 | Stop reason: HOSPADM

## 2025-03-04 RX ORDER — DIPHENHYDRAMINE HYDROCHLORIDE 50 MG/ML
12.5 INJECTION INTRAMUSCULAR; INTRAVENOUS
Status: DISCONTINUED | OUTPATIENT
Start: 2025-03-04 | End: 2025-03-04 | Stop reason: HOSPADM

## 2025-03-04 RX ORDER — FLUCONAZOLE 100 MG/1
100 TABLET ORAL DAILY
Qty: 7 TABLET | Refills: 0 | Status: SHIPPED
Start: 2025-03-04 | End: 2025-03-04 | Stop reason: HOSPADM

## 2025-03-04 RX ORDER — ACETAMINOPHEN 500 MG
1000 TABLET ORAL ONCE
Status: COMPLETED | OUTPATIENT
Start: 2025-03-04 | End: 2025-03-04

## 2025-03-04 RX ORDER — MEPERIDINE HYDROCHLORIDE 50 MG/ML
12.5 INJECTION INTRAMUSCULAR; INTRAVENOUS; SUBCUTANEOUS EVERY 5 MIN PRN
Status: DISCONTINUED | OUTPATIENT
Start: 2025-03-04 | End: 2025-03-04 | Stop reason: HOSPADM

## 2025-03-04 RX ORDER — PHENYLEPHRINE HCL IN 0.9% NACL 1 MG/10 ML
SYRINGE (ML) INTRAVENOUS
Status: DISCONTINUED | OUTPATIENT
Start: 2025-03-04 | End: 2025-03-04 | Stop reason: SDUPTHER

## 2025-03-04 RX ORDER — KETOROLAC TROMETHAMINE 30 MG/ML
30 INJECTION, SOLUTION INTRAMUSCULAR; INTRAVENOUS ONCE
Status: COMPLETED | OUTPATIENT
Start: 2025-03-04 | End: 2025-03-04

## 2025-03-04 RX ORDER — ROCURONIUM BROMIDE 10 MG/ML
INJECTION, SOLUTION INTRAVENOUS
Status: DISCONTINUED | OUTPATIENT
Start: 2025-03-04 | End: 2025-03-04 | Stop reason: SDUPTHER

## 2025-03-04 RX ORDER — LIDOCAINE HYDROCHLORIDE 20 MG/ML
INJECTION, SOLUTION EPIDURAL; INFILTRATION; INTRACAUDAL; PERINEURAL
Status: DISCONTINUED | OUTPATIENT
Start: 2025-03-04 | End: 2025-03-04 | Stop reason: SDUPTHER

## 2025-03-04 RX ORDER — MORPHINE SULFATE 4 MG/ML
4 INJECTION, SOLUTION INTRAMUSCULAR; INTRAVENOUS
Status: DISCONTINUED | OUTPATIENT
Start: 2025-03-04 | End: 2025-03-05

## 2025-03-04 RX ORDER — FLUCONAZOLE 100 MG/1
100 TABLET ORAL DAILY
Qty: 7 TABLET | Refills: 0 | Status: SHIPPED | OUTPATIENT
Start: 2025-03-04 | End: 2025-03-11

## 2025-03-04 RX ORDER — CIPROFLOXACIN 500 MG/1
500 TABLET, FILM COATED ORAL 2 TIMES DAILY
Qty: 14 TABLET | Refills: 0 | Status: SHIPPED
Start: 2025-03-04 | End: 2025-03-04 | Stop reason: HOSPADM

## 2025-03-04 RX ORDER — SODIUM CHLORIDE 9 MG/ML
INJECTION, SOLUTION INTRAVENOUS CONTINUOUS
Status: DISCONTINUED | OUTPATIENT
Start: 2025-03-04 | End: 2025-03-04 | Stop reason: HOSPADM

## 2025-03-04 RX ORDER — OXYMETAZOLINE HYDROCHLORIDE 0.05 G/100ML
2 SPRAY NASAL 2 TIMES DAILY
Status: DISCONTINUED | OUTPATIENT
Start: 2025-03-04 | End: 2025-03-05 | Stop reason: HOSPADM

## 2025-03-04 RX ORDER — BACLOFEN 10 MG/1
10 TABLET ORAL 3 TIMES DAILY
Qty: 30 TABLET | Refills: 0 | Status: SHIPPED | OUTPATIENT
Start: 2025-03-04 | End: 2025-03-14

## 2025-03-04 RX ORDER — TRIAMTERENE AND HYDROCHLOROTHIAZIDE 37.5; 25 MG/1; MG/1
1 TABLET ORAL DAILY
Status: DISCONTINUED | OUTPATIENT
Start: 2025-03-04 | End: 2025-03-05 | Stop reason: HOSPADM

## 2025-03-04 RX ORDER — ENOXAPARIN SODIUM 100 MG/ML
40 INJECTION SUBCUTANEOUS DAILY
Status: DISCONTINUED | OUTPATIENT
Start: 2025-03-05 | End: 2025-03-05 | Stop reason: HOSPADM

## 2025-03-04 RX ORDER — SODIUM CHLORIDE, SODIUM LACTATE, POTASSIUM CHLORIDE, CALCIUM CHLORIDE 600; 310; 30; 20 MG/100ML; MG/100ML; MG/100ML; MG/100ML
INJECTION, SOLUTION INTRAVENOUS CONTINUOUS
Status: DISCONTINUED | OUTPATIENT
Start: 2025-03-04 | End: 2025-03-04 | Stop reason: HOSPADM

## 2025-03-04 RX ORDER — BUSPIRONE HYDROCHLORIDE 5 MG/1
5 TABLET ORAL 3 TIMES DAILY
Status: DISCONTINUED | OUTPATIENT
Start: 2025-03-04 | End: 2025-03-05 | Stop reason: HOSPADM

## 2025-03-04 RX ORDER — HYDROCODONE BITARTRATE AND ACETAMINOPHEN 5; 325 MG/1; MG/1
1 TABLET ORAL EVERY 4 HOURS PRN
Qty: 42 TABLET | Refills: 0 | Status: SHIPPED | OUTPATIENT
Start: 2025-03-04 | End: 2025-03-11

## 2025-03-04 RX ORDER — MIDAZOLAM HYDROCHLORIDE 1 MG/ML
INJECTION, SOLUTION INTRAMUSCULAR; INTRAVENOUS
Status: DISCONTINUED | OUTPATIENT
Start: 2025-03-04 | End: 2025-03-04 | Stop reason: SDUPTHER

## 2025-03-04 RX ORDER — HYDROMORPHONE HYDROCHLORIDE 1 MG/ML
0.5 INJECTION, SOLUTION INTRAMUSCULAR; INTRAVENOUS; SUBCUTANEOUS EVERY 5 MIN PRN
Status: COMPLETED | OUTPATIENT
Start: 2025-03-04 | End: 2025-03-04

## 2025-03-04 RX ORDER — BACLOFEN 10 MG/1
10 TABLET ORAL 3 TIMES DAILY
Qty: 30 TABLET | Refills: 0 | Status: SHIPPED
Start: 2025-03-04 | End: 2025-03-04 | Stop reason: HOSPADM

## 2025-03-04 RX ORDER — ONDANSETRON 4 MG/1
4 TABLET, ORALLY DISINTEGRATING ORAL 3 TIMES DAILY PRN
Qty: 21 TABLET | Refills: 0 | Status: SHIPPED | OUTPATIENT
Start: 2025-03-04

## 2025-03-04 RX ORDER — PROCHLORPERAZINE EDISYLATE 5 MG/ML
10 INJECTION INTRAMUSCULAR; INTRAVENOUS
Status: DISCONTINUED | OUTPATIENT
Start: 2025-03-04 | End: 2025-03-04 | Stop reason: HOSPADM

## 2025-03-04 RX ORDER — HYDROCODONE BITARTRATE AND ACETAMINOPHEN 5; 325 MG/1; MG/1
1 TABLET ORAL EVERY 4 HOURS PRN
Qty: 42 TABLET | Refills: 0 | Status: SHIPPED
Start: 2025-03-04 | End: 2025-03-04 | Stop reason: HOSPADM

## 2025-03-04 RX ORDER — CEFAZOLIN SODIUM 1 G/3ML
INJECTION, POWDER, FOR SOLUTION INTRAMUSCULAR; INTRAVENOUS
Status: DISCONTINUED | OUTPATIENT
Start: 2025-03-04 | End: 2025-03-04 | Stop reason: SDUPTHER

## 2025-03-04 RX ORDER — CIPROFLOXACIN 500 MG/1
500 TABLET, FILM COATED ORAL 2 TIMES DAILY
Qty: 20 TABLET | Refills: 0 | Status: SHIPPED | OUTPATIENT
Start: 2025-03-04 | End: 2025-03-14

## 2025-03-04 RX ORDER — FLUTICASONE PROPIONATE 50 MCG
2 SPRAY, SUSPENSION (ML) NASAL DAILY PRN
Status: DISCONTINUED | OUTPATIENT
Start: 2025-03-04 | End: 2025-03-05 | Stop reason: HOSPADM

## 2025-03-04 RX ORDER — PROPOFOL 10 MG/ML
INJECTION, EMULSION INTRAVENOUS
Status: DISCONTINUED | OUTPATIENT
Start: 2025-03-04 | End: 2025-03-04 | Stop reason: SDUPTHER

## 2025-03-04 RX ORDER — SODIUM CHLORIDE, SODIUM LACTATE, POTASSIUM CHLORIDE, CALCIUM CHLORIDE 600; 310; 30; 20 MG/100ML; MG/100ML; MG/100ML; MG/100ML
INJECTION, SOLUTION INTRAVENOUS CONTINUOUS
Status: DISCONTINUED | OUTPATIENT
Start: 2025-03-04 | End: 2025-03-05 | Stop reason: HOSPADM

## 2025-03-04 RX ORDER — FENTANYL CITRATE 50 UG/ML
INJECTION, SOLUTION INTRAMUSCULAR; INTRAVENOUS
Status: DISCONTINUED | OUTPATIENT
Start: 2025-03-04 | End: 2025-03-04 | Stop reason: SDUPTHER

## 2025-03-04 RX ORDER — ONDANSETRON 2 MG/ML
INJECTION INTRAMUSCULAR; INTRAVENOUS
Status: DISCONTINUED | OUTPATIENT
Start: 2025-03-04 | End: 2025-03-04 | Stop reason: SDUPTHER

## 2025-03-04 RX ORDER — BENZTROPINE MESYLATE 1 MG/1
2 TABLET ORAL 2 TIMES DAILY PRN
Status: DISCONTINUED | OUTPATIENT
Start: 2025-03-04 | End: 2025-03-05 | Stop reason: HOSPADM

## 2025-03-04 RX ORDER — DEXAMETHASONE SODIUM PHOSPHATE 10 MG/ML
INJECTION, SOLUTION INTRAMUSCULAR; INTRAVENOUS
Status: DISCONTINUED | OUTPATIENT
Start: 2025-03-04 | End: 2025-03-04 | Stop reason: SDUPTHER

## 2025-03-04 RX ORDER — IPRATROPIUM BROMIDE AND ALBUTEROL SULFATE 2.5; .5 MG/3ML; MG/3ML
1 SOLUTION RESPIRATORY (INHALATION) ONCE
Status: DISCONTINUED | OUTPATIENT
Start: 2025-03-04 | End: 2025-03-04

## 2025-03-04 RX ORDER — SODIUM CHLORIDE 9 MG/ML
INJECTION, SOLUTION INTRAVENOUS PRN
Status: DISCONTINUED | OUTPATIENT
Start: 2025-03-04 | End: 2025-03-05 | Stop reason: HOSPADM

## 2025-03-04 RX ORDER — SODIUM CHLORIDE 9 MG/ML
INJECTION, SOLUTION INTRAVENOUS PRN
Status: DISCONTINUED | OUTPATIENT
Start: 2025-03-04 | End: 2025-03-04 | Stop reason: HOSPADM

## 2025-03-04 RX ORDER — GABAPENTIN 100 MG/1
100 CAPSULE ORAL 3 TIMES DAILY
Status: DISCONTINUED | OUTPATIENT
Start: 2025-03-04 | End: 2025-03-05 | Stop reason: HOSPADM

## 2025-03-04 RX ORDER — SCOPOLAMINE 1 MG/3D
1 PATCH, EXTENDED RELEASE TRANSDERMAL ONCE
Status: DISCONTINUED | OUTPATIENT
Start: 2025-03-04 | End: 2025-03-05 | Stop reason: HOSPADM

## 2025-03-04 RX ORDER — TRAZODONE HYDROCHLORIDE 100 MG/1
100 TABLET ORAL NIGHTLY
Status: DISCONTINUED | OUTPATIENT
Start: 2025-03-04 | End: 2025-03-05 | Stop reason: HOSPADM

## 2025-03-04 RX ORDER — FENTANYL CITRATE 50 UG/ML
25 INJECTION, SOLUTION INTRAMUSCULAR; INTRAVENOUS EVERY 5 MIN PRN
Status: DISCONTINUED | OUTPATIENT
Start: 2025-03-04 | End: 2025-03-04 | Stop reason: HOSPADM

## 2025-03-04 RX ORDER — POLYETHYLENE GLYCOL 3350 17 G/17G
17 POWDER, FOR SOLUTION ORAL DAILY
Status: DISCONTINUED | OUTPATIENT
Start: 2025-03-04 | End: 2025-03-05 | Stop reason: HOSPADM

## 2025-03-04 RX ORDER — BACLOFEN 10 MG/1
10 TABLET ORAL 3 TIMES DAILY
Status: DISCONTINUED | OUTPATIENT
Start: 2025-03-04 | End: 2025-03-05 | Stop reason: HOSPADM

## 2025-03-04 RX ORDER — SODIUM CHLORIDE 0.9 % (FLUSH) 0.9 %
5-40 SYRINGE (ML) INJECTION PRN
Status: DISCONTINUED | OUTPATIENT
Start: 2025-03-04 | End: 2025-03-05 | Stop reason: HOSPADM

## 2025-03-04 RX ORDER — CEFAZOLIN SODIUM/WATER 2 G/20 ML
2000 SYRINGE (ML) INTRAVENOUS EVERY 8 HOURS
Status: DISCONTINUED | OUTPATIENT
Start: 2025-03-04 | End: 2025-03-05 | Stop reason: HOSPADM

## 2025-03-04 RX ORDER — LISINOPRIL 40 MG/1
40 TABLET ORAL DAILY
Status: DISCONTINUED | OUTPATIENT
Start: 2025-03-04 | End: 2025-03-05 | Stop reason: HOSPADM

## 2025-03-04 RX ORDER — ONDANSETRON 2 MG/ML
4 INJECTION INTRAMUSCULAR; INTRAVENOUS EVERY 6 HOURS PRN
Status: DISCONTINUED | OUTPATIENT
Start: 2025-03-04 | End: 2025-03-05 | Stop reason: HOSPADM

## 2025-03-04 RX ORDER — ARIPIPRAZOLE 5 MG/1
20 TABLET ORAL EVERY MORNING
Status: DISCONTINUED | OUTPATIENT
Start: 2025-03-05 | End: 2025-03-05 | Stop reason: HOSPADM

## 2025-03-04 RX ORDER — OXYCODONE HYDROCHLORIDE 5 MG/1
5 TABLET ORAL
Status: COMPLETED | OUTPATIENT
Start: 2025-03-04 | End: 2025-03-04

## 2025-03-04 RX ORDER — SODIUM CHLORIDE 0.9 % (FLUSH) 0.9 %
5-40 SYRINGE (ML) INJECTION EVERY 12 HOURS SCHEDULED
Status: DISCONTINUED | OUTPATIENT
Start: 2025-03-04 | End: 2025-03-04 | Stop reason: HOSPADM

## 2025-03-04 RX ORDER — METOCLOPRAMIDE HYDROCHLORIDE 5 MG/ML
10 INJECTION INTRAMUSCULAR; INTRAVENOUS
Status: DISCONTINUED | OUTPATIENT
Start: 2025-03-04 | End: 2025-03-04 | Stop reason: HOSPADM

## 2025-03-04 RX ORDER — ONDANSETRON 4 MG/1
4 TABLET, ORALLY DISINTEGRATING ORAL EVERY 8 HOURS PRN
Status: DISCONTINUED | OUTPATIENT
Start: 2025-03-04 | End: 2025-03-05 | Stop reason: HOSPADM

## 2025-03-04 RX ORDER — MORPHINE SULFATE 2 MG/ML
2 INJECTION, SOLUTION INTRAMUSCULAR; INTRAVENOUS
Status: DISCONTINUED | OUTPATIENT
Start: 2025-03-04 | End: 2025-03-05

## 2025-03-04 RX ORDER — SODIUM CHLORIDE 0.9 % (FLUSH) 0.9 %
5-40 SYRINGE (ML) INJECTION PRN
Status: DISCONTINUED | OUTPATIENT
Start: 2025-03-04 | End: 2025-03-04 | Stop reason: HOSPADM

## 2025-03-04 RX ORDER — ONDANSETRON 4 MG/1
4 TABLET, ORALLY DISINTEGRATING ORAL 3 TIMES DAILY PRN
Qty: 21 TABLET | Refills: 0 | Status: SHIPPED
Start: 2025-03-04 | End: 2025-03-04 | Stop reason: HOSPADM

## 2025-03-04 RX ORDER — NALOXONE HYDROCHLORIDE 0.4 MG/ML
INJECTION, SOLUTION INTRAMUSCULAR; INTRAVENOUS; SUBCUTANEOUS PRN
Status: DISCONTINUED | OUTPATIENT
Start: 2025-03-04 | End: 2025-03-04 | Stop reason: HOSPADM

## 2025-03-04 RX ADMIN — SUGAMMADEX 200 MG: 100 INJECTION, SOLUTION INTRAVENOUS at 14:57

## 2025-03-04 RX ADMIN — Medication 200 MCG: at 14:19

## 2025-03-04 RX ADMIN — GABAPENTIN 100 MG: 100 CAPSULE ORAL at 20:46

## 2025-03-04 RX ADMIN — ROCURONIUM BROMIDE 50 MG: 50 INJECTION INTRAVENOUS at 12:11

## 2025-03-04 RX ADMIN — HYDROMORPHONE HYDROCHLORIDE 0.5 MG: 1 INJECTION, SOLUTION INTRAMUSCULAR; INTRAVENOUS; SUBCUTANEOUS at 15:30

## 2025-03-04 RX ADMIN — MORPHINE SULFATE 4 MG: 4 INJECTION, SOLUTION INTRAMUSCULAR; INTRAVENOUS at 20:46

## 2025-03-04 RX ADMIN — LIDOCAINE HYDROCHLORIDE 100 MG: 20 INJECTION, SOLUTION EPIDURAL; INFILTRATION; INTRACAUDAL; PERINEURAL at 12:11

## 2025-03-04 RX ADMIN — Medication 100 MCG: at 12:31

## 2025-03-04 RX ADMIN — FENTANYL CITRATE 50 MCG: 50 INJECTION INTRAMUSCULAR; INTRAVENOUS at 12:11

## 2025-03-04 RX ADMIN — HYDROMORPHONE HYDROCHLORIDE 0.5 MG: 1 INJECTION, SOLUTION INTRAMUSCULAR; INTRAVENOUS; SUBCUTANEOUS at 16:03

## 2025-03-04 RX ADMIN — SODIUM CHLORIDE, POTASSIUM CHLORIDE, SODIUM LACTATE AND CALCIUM CHLORIDE: 600; 310; 30; 20 INJECTION, SOLUTION INTRAVENOUS at 09:48

## 2025-03-04 RX ADMIN — HYDROMORPHONE HYDROCHLORIDE 0.5 MG: 1 INJECTION, SOLUTION INTRAMUSCULAR; INTRAVENOUS; SUBCUTANEOUS at 15:42

## 2025-03-04 RX ADMIN — ACETAMINOPHEN 1000 MG: 500 TABLET ORAL at 10:30

## 2025-03-04 RX ADMIN — MIDAZOLAM 2 MG: 1 INJECTION INTRAMUSCULAR; INTRAVENOUS at 12:07

## 2025-03-04 RX ADMIN — CEFAZOLIN 2 G: 1 INJECTION, POWDER, FOR SOLUTION INTRAMUSCULAR; INTRAVENOUS at 12:22

## 2025-03-04 RX ADMIN — FENTANYL CITRATE 50 MCG: 50 INJECTION INTRAMUSCULAR; INTRAVENOUS at 12:43

## 2025-03-04 RX ADMIN — Medication 200 MCG: at 12:38

## 2025-03-04 RX ADMIN — OXYCODONE HYDROCHLORIDE 5 MG: 5 TABLET ORAL at 17:45

## 2025-03-04 RX ADMIN — FENTANYL CITRATE 50 MCG: 50 INJECTION INTRAMUSCULAR; INTRAVENOUS at 13:29

## 2025-03-04 RX ADMIN — DEXAMETHASONE SODIUM PHOSPHATE 10 MG: 10 INJECTION INTRAMUSCULAR; INTRAVENOUS at 12:24

## 2025-03-04 RX ADMIN — ONDANSETRON 4 MG: 2 INJECTION, SOLUTION INTRAMUSCULAR; INTRAVENOUS at 14:30

## 2025-03-04 RX ADMIN — PROPOFOL 200 MG: 10 INJECTION, EMULSION INTRAVENOUS at 12:11

## 2025-03-04 RX ADMIN — BACLOFEN 10 MG: 10 TABLET ORAL at 20:45

## 2025-03-04 RX ADMIN — ACETAMINOPHEN 1000 MG: 500 TABLET ORAL at 19:12

## 2025-03-04 RX ADMIN — POLYETHYLENE GLYCOL 3350 17 G: 17 POWDER, FOR SOLUTION ORAL at 21:27

## 2025-03-04 RX ADMIN — KETOROLAC TROMETHAMINE 30 MG: 30 INJECTION, SOLUTION INTRAMUSCULAR at 19:12

## 2025-03-04 RX ADMIN — FENTANYL CITRATE 25 MCG: 50 INJECTION INTRAMUSCULAR; INTRAVENOUS at 14:29

## 2025-03-04 RX ADMIN — SODIUM CHLORIDE, POTASSIUM CHLORIDE, SODIUM LACTATE AND CALCIUM CHLORIDE: 600; 310; 30; 20 INJECTION, SOLUTION INTRAVENOUS at 14:14

## 2025-03-04 RX ADMIN — HYDROMORPHONE HYDROCHLORIDE 0.5 MG: 1 INJECTION, SOLUTION INTRAMUSCULAR; INTRAVENOUS; SUBCUTANEOUS at 15:23

## 2025-03-04 RX ADMIN — FENTANYL CITRATE 25 MCG: 50 INJECTION INTRAMUSCULAR; INTRAVENOUS at 14:48

## 2025-03-04 RX ADMIN — Medication 2000 MG: at 21:27

## 2025-03-04 ASSESSMENT — PAIN DESCRIPTION - LOCATION
LOCATION: BACK

## 2025-03-04 ASSESSMENT — PAIN SCALES - GENERAL
PAINLEVEL_OUTOF10: 4
PAINLEVEL_OUTOF10: 6
PAINLEVEL_OUTOF10: 10
PAINLEVEL_OUTOF10: 10
PAINLEVEL_OUTOF10: 0
PAINLEVEL_OUTOF10: 8
PAINLEVEL_OUTOF10: 7
PAINLEVEL_OUTOF10: 8
PAINLEVEL_OUTOF10: 10
PAINLEVEL_OUTOF10: 7
PAINLEVEL_OUTOF10: 10
PAINLEVEL_OUTOF10: 9
PAINLEVEL_OUTOF10: 10

## 2025-03-04 ASSESSMENT — PAIN - FUNCTIONAL ASSESSMENT
PAIN_FUNCTIONAL_ASSESSMENT: PREVENTS OR INTERFERES SOME ACTIVE ACTIVITIES AND ADLS
PAIN_FUNCTIONAL_ASSESSMENT: 0-10

## 2025-03-04 ASSESSMENT — PAIN DESCRIPTION - ORIENTATION: ORIENTATION: LEFT;RIGHT

## 2025-03-04 NOTE — DISCHARGE INSTRUCTIONS
POST-OPERATIVE INSTRUCTIONS FOR SUTURE LINECARE   Incisions and suture lines are a necessary part of surgery.  These lines take many months to fully heal. Part of the healing process requires proper cleansing and care.  In addition, there are treatments that can help resulting scars to be flatter, finer, and less noticeable. There is no guarantee to what a scar will look like once it has fully healed, however the following instructions are important to a good outcome.  Do not smoke.  You have been advised to quit before surgery.  Do not start after surgery.  Smoking decreases the oxygen in your blood and greatly impacts your ability to heal.  Do not smoke until your incisions have fully healed.         IF YOU HAVE AN INCISIONAL WOUND VAC, DO NOT REMOVE THE DRESSING UNTIL YOU SEE DR. NIELSEN BACK IN THE OFFICE POSTOPERATIVELY.  You may sponge bath until office visit.  We do not expect too much drainage in the canister.  You may remove your binder to sponge bath.    If you have sutures, they will remain in the skin anywhere from 7-21 days, or longer depending on the area and your healing.  Please call for an appointment if one has not been scheduled.  You may notice a slight drainage of either blood or a clear type of liquid from the area, which is normal.  If the drainage has an odor to it call the office.   DIET: Resume your previous diet.   ACTIVITY: Avoid straining, strenuous exercise, or lifting over five pounds for 14 days, unless instructed otherwise.  Depending on they type of surgery you have had this may be as long as SIX WEEKS.   (Do not bend over for the first 24 hours after your surgery).  Direct trauma and physical stress may result in a separation of the suture line or wider scars.     MEDICATIONS: If you have been given an antibiotic, take until finished.    Please take pain medication as prescribed, follow directions CAREFULLY.   Note: As Needed  Do not drive or operate machinery while on pain

## 2025-03-04 NOTE — PROGRESS NOTES
Pt. C/o chest feeling tight and having difficulty breathing. Dr. Agudelo to bedside to assess. Ordered duoneb and cheset x-ray

## 2025-03-04 NOTE — ANESTHESIA POSTPROCEDURE EVALUATION
Department of Anesthesiology  Postprocedure Note    Patient: Lara Voss  MRN: [id number]  YOB: 1975  Date of evaluation: 3/4/2025    Procedure Summary       Date: 03/04/25 Room / Location: [address]    Anesthesia Start: 1207 Anesthesia Stop: 1516    Procedures:       LIPOSUCTION BILATERAL FLANKS AND BACK, SCAR REVISION BILATERAL FLANKS AND BACK WITH PREVANA WOUND VAC AND BIOPATCH (Bilateral: Flank)      SCAR REVISION/EXCISION (Bilateral: Back) Diagnosis:       Lipodystrophy      Painful scar      (Lipodystrophy [E88.1])      (Painful scar [R52, L90.5])    Surgeons: Deana Choudhary MD Responsible Provider: Sumit Agudelo DO    Anesthesia Type: general ASA Status: 2            Anesthesia Type: No value filed.    Chery Phase I: Chery Score: 8    Chery Phase II:      Anesthesia Post Evaluation    Patient location during evaluation: PACU  Patient participation: complete - patient participated  Level of consciousness: awake and alert  Airway patency: patent  Nausea & Vomiting: no nausea and no vomiting  Cardiovascular status: hemodynamically stable  Respiratory status: acceptable  Hydration status: stable  Pain management: adequate    No notable events documented.

## 2025-03-04 NOTE — ANESTHESIA PRE PROCEDURE
section Z98.891    Bipolar 1 disorder, depressed (HCC) F31.9    Post traumatic stress disorder F43.10    Primary insomnia F51.01    Major depressive disorder, recurrent episode, mild F33.0    Chronic pain of right knee M25.561, G89.29    Neck pain on right side M54.2    History of sinus surgery Z98.890    Episodic overuse of medication Z91.148    S/P bilateral breast reduction Z98.890    S/P gastric surgery Z98.890    Chronic idiopathic constipation K59.04    Absolute anemia D64.9    Elevated d-dimer R79.89       Past Medical History:        Diagnosis Date    Bell's palsy     Bipolar 1 disorder, depressed (HCC) 2018    GERD (gastroesophageal reflux disease)     HTN (hypertension)     Iron (Fe) deficiency anemia     Obesity     Post traumatic stress disorder     Post-nasal drip     Seasonal allergies        Past Surgical History:        Procedure Laterality Date    ABDOMEN SURGERY  2022    ABDOMINAL LIPECTOMY SUCTION WITH SCAR REVISION AND MONS LIFT     ABDOMINOPLASTY N/A 2022    ABDOMINAL LIPECTOMY SUCTION WITH SCAR REVISION AND MONS LIFT performed by Deana Choudhary MD at [facility] OR    BREAST REDUCTION SURGERY       SECTION      Twice    LIPECTOMY N/A 10/19/2021    PANNICULECTOMY/ABDOMINOPLASTY, TAP BLOCK, INCISIONAL WOUND VAC, BIOPATCH performed by Deana Choudhary MD at [facility] OR    NASAL SINUS SURGERY         Social History:    Social History     Tobacco Use    Smoking status: Former     Types: Cigarettes     Passive exposure: Never    Smokeless tobacco: Never   Substance Use Topics    Alcohol use: Not Currently                                Counseling given: Not Answered      Vital Signs (Current):   Vitals:    25 0927 25 0933 25 0953   BP:  (!) 153/91 122/82   Pulse:  (!) 102 87   Resp:  16    Temp:  98.6 °F (37 °C)    SpO2:  97%    Weight: 83 kg (183 lb)     Height: 1.549 m (5' 1\")                                                BP Readings from Last 3 Encounters:

## 2025-03-04 NOTE — OP NOTE
performed and everybody in the room was in agreement with the time-out.  Then using #11 blade stab incisions were made.  Then, tumescent solution was injected into the lower back mid back and upper back bilateral and a total of 1000 mL of tumescent solution was placed in the right back and flank, and 1000 cc of tumescent solution was placed in the left back and flank.  Then using #3, and #4 cannulize liposuction was performed in. Multi directional.  The area was pinched and minimum to no fat could be pinched.  A total of thousand cc was suctioned out out of the right back and flank, and 1000 cc was suctioned out out of the left back and flank..     Then using a #10 blade the area that was previously marked for scar revision was excised.  Dissection was made through the skin subcutaneous tissue to above the fascia of the muscle this was done using a cautery.  All areas were inspected any bleeding point was notified was cauterized.  Then extensive undermining was performed medially, laterally, superiorly, inferiorly to obtain a tension-free closure.  Then a #19 Alexi drain was placed the drain was brought out through the previous scar.  Then it was sutured in place using 2-0 Prolene.  Then the skin was stapled.  Then the dogears were excised.  Then the staples with both were removed then using 2-0 Vicryl in an interrupted manner the deep tissue was closed.  Then using 2-0 strata fix in a subcuticular manner the skin was closed.  Then an incisional wound VAC was placed.  A good seal was obtained.  Then using 4-0 Prolene in an interrupted manner the skin for the port holes were closed.  Then 4 x 4's and Tegaderms were placed.  Then, an abdominal binder was placed.   Patient tolerated the procedure well.  Patient was transferred to the recovery room in stable condition.    Electronically signed by Deana Choudhary MD on 3/4/2025 at 3:14 PM

## 2025-03-04 NOTE — H&P
prior to surgery, any history that you may have of significant emotional depression or mental health disorders.  Although many individuals may benefit psychologically from the results of elective surgery, effects on mental health cannot be accurately predicted.     Medications- There are many adverse reactions that occur as the result of taking over the counter, herbal, and/or prescription medications.  Be sure to check with your physician about any drug interactions that may exist with medications which you are already taking.  If you have an adverse reaction, stop the drugs immediately and call your plastic surgeon for further instructions.  If the reaction is severe, go immediately to the nearest emergency room.  When taking the prescribed pain medications after surgery, realize that they can affect your thought process and coordination.  Do not drive, do not operate complex equipment, do not make any important decisions and do not drink any alcohol while taking these medications.  Be sure to take your prescribed medication only as directed.  Smoking, Second-Hand Smoke Exposure, Nicotine Products (Patch, Gum, Nasal Spray)-   Patients who are currently smoking, use tobacco products, or nicotine products (patch, gum, or nasal spray) are at a greater risk for significant surgical complications of skin dying, delayed healing, and additional scarring.  Individuals exposed to second-hand smoke are also at potential risk for similar complications attributable to nicotine exposure.  Additionally, smoking may have a significant negative effect on anesthesia and recovery from anesthesia, with coughing and possibly increased bleeding.  Individuals who are not exposed to tobacco smoke or nicotine-containing products have a significantly lower risk of this type of complication. You must stop smoking 6 weeks before and 6 weeks after your surgery.     ADDITIONAL SURGERY NECESSARY  There are many variable conditions in addition 
services provided.  The total includes fees charged by your surgeon, the cost of surgical supplies, anesthesia, laboratory tests, and possible hospital charges, depending on where the surgery is performed.  Depending on whether the cost of surgery is covered by an insurance plan, you will be responsible for necessary co-payments, deductibles, and charges not covered.  The fees charged for this procedure do not include any potential future costs for additional procedures that you elect to have or require in order to revise, optimize, or complete your outcome.  Additional costs may occur should complications develop from the surgery.  Secondary surgery or hospital day-surgery charges involved with revision surgery will also be your responsibility.  I also informed the patient that photographs may be taken.  These images or illustration along with my medical records created in my case may be used for obtaining insurance approval, for use in examination, may assist in education, testing, credentialing and or certifying by [employer].  These images and records may be used to communicate with referring physician.    Electronically signed by:  Deana Choudhary MD 3/4/2025

## 2025-03-04 NOTE — PROGRESS NOTES
Pt. Expressed concern to RN about pain not being controlled and wanting to stay. Dr. Choudhary updated on pt. Condition. RN to monitor pt. Longer to see if condition improves.

## 2025-03-05 VITALS
SYSTOLIC BLOOD PRESSURE: 97 MMHG | OXYGEN SATURATION: 99 % | HEART RATE: 71 BPM | RESPIRATION RATE: 16 BRPM | HEIGHT: 61 IN | WEIGHT: 202.3 LBS | BODY MASS INDEX: 38.19 KG/M2 | DIASTOLIC BLOOD PRESSURE: 51 MMHG | TEMPERATURE: 98.2 F

## 2025-03-05 PROCEDURE — 6370000000 HC RX 637 (ALT 250 FOR IP): Performed by: PLASTIC SURGERY

## 2025-03-05 PROCEDURE — 2580000003 HC RX 258: Performed by: PLASTIC SURGERY

## 2025-03-05 PROCEDURE — 6360000002 HC RX W HCPCS: Performed by: PLASTIC SURGERY

## 2025-03-05 RX ORDER — HYDROCODONE BITARTRATE AND ACETAMINOPHEN 5; 325 MG/1; MG/1
1 TABLET ORAL EVERY 6 HOURS PRN
Status: DISCONTINUED | OUTPATIENT
Start: 2025-03-05 | End: 2025-03-05 | Stop reason: HOSPADM

## 2025-03-05 RX ADMIN — SODIUM CHLORIDE, SODIUM LACTATE, POTASSIUM CHLORIDE, AND CALCIUM CHLORIDE: .6; .31; .03; .02 INJECTION, SOLUTION INTRAVENOUS at 02:25

## 2025-03-05 RX ADMIN — HYDROCODONE BITARTRATE AND ACETAMINOPHEN 1 TABLET: 5; 325 TABLET ORAL at 09:53

## 2025-03-05 RX ADMIN — GABAPENTIN 100 MG: 100 CAPSULE ORAL at 09:46

## 2025-03-05 RX ADMIN — Medication 2000 MG: at 05:20

## 2025-03-05 RX ADMIN — BACLOFEN 10 MG: 10 TABLET ORAL at 09:46

## 2025-03-05 RX ADMIN — ENOXAPARIN SODIUM 40 MG: 100 INJECTION SUBCUTANEOUS at 09:46

## 2025-03-05 ASSESSMENT — PAIN DESCRIPTION - DESCRIPTORS: DESCRIPTORS: BURNING

## 2025-03-05 ASSESSMENT — PAIN SCALES - GENERAL
PAINLEVEL_OUTOF10: 7
PAINLEVEL_OUTOF10: 5

## 2025-03-05 ASSESSMENT — PAIN DESCRIPTION - LOCATION: LOCATION: BACK

## 2025-03-05 NOTE — PROGRESS NOTES
Pt admitted to room 2014 from PACU in stable condition. Oriented to room, call light and bed mechanics. Side rails up x2. Call light within reach. Orders reviewed.

## 2025-03-05 NOTE — PROGRESS NOTES
Pt discharged to home in stable condition with belongings  Discharge instructions given  Medications picked up yesterday from pharmacy  Pt denies having any further questions at this time  Locked up home medication(s)/personal items given to patient at discharge  Patient/family state they have everything they were admitted with.  Dressing change supplies and hibiclens sent home with patient

## 2025-03-05 NOTE — CARE COORDINATION
Case Management Assessment  Initial Evaluation    Date/Time of Evaluation: 3/5/2025 1:40 PM  Assessment Completed by: Zarina Brown RN    If patient is discharged prior to next notation, then this note serves as note for discharge by case management.    Patient Name: Lara Voss                   YOB: 1975  Diagnosis: Lipodystrophy [E88.1]  Painful scar [R52, L90.5]                   Date / Time: 3/4/2025  9:14 AM    Patient Admission Status: Outpatient in a bed   Readmission Risk (Low < 19, Mod (19-27), High > 27): No data recorded  Current PCP: Iliana   PCP verified by CM? Yes    Chart Reviewed: Yes      History Provided by: Patient  Patient Orientation: Alert and Oriented    Patient Cognition: Alert    Hospitalization in the last 30 days (Readmission):  No    If yes, Readmission Assessment in CM Navigator will be completed.    Advance Directives:      Code Status: Full Code   Patient's Primary Decision Maker is: Legal Next of Kin    Primary Decision Maker: lynn luevano - [facility] - [phone]    Discharge Planning:    Patient lives with: Alone Type of Home: Apartment  Primary Care Giver: Self  Patient Support Systems include: Parent   Current Financial resources: Other (Comment)  Current community resources:    Current services prior to admission: None            Current DME:              Type of Home Care services:  None    ADLS  Prior functional level: Independent in ADLs/IADLs  Current functional level: Assistance with the following:, Shopping, Housework    PT AM-PAC:   /24  OT AM-PAC:   /24    Family can provide assistance at DC: Yes  Would you like Case Management to discuss the discharge plan with any other family members/significant others, and if so, who? No  Plans to Return to Present Housing: Yes  Other Identified Issues/Barriers to RETURNING to current housing: na   Potential Assistance needed at discharge: N/A            Potential DME:    Patient expects to discharge to:

## 2025-03-05 NOTE — PLAN OF CARE
Problem: Discharge Planning  Goal: Discharge to home or other facility with appropriate resources  Outcome: Adequate for Discharge  Flowsheets (Taken 3/5/2025 0946)  Discharge to home or other facility with appropriate resources:   Identify barriers to discharge with patient and caregiver   Arrange for needed discharge resources and transportation as appropriate   Identify discharge learning needs (meds, wound care, etc)     Problem: Pain  Goal: Verbalizes/displays adequate comfort level or baseline comfort level  Outcome: Adequate for Discharge     Problem: ABCDS Injury Assessment  Goal: Absence of physical injury  Outcome: Adequate for Discharge     Problem: Skin/Tissue Integrity - Adult  Goal: Incisions, wounds, or drain sites healing without S/S of infection  Outcome: Adequate for Discharge  Flowsheets (Taken 3/5/2025 0946)  Incisions, Wounds, or Drain Sites Healing Without Sign and Symptoms of Infection: TWICE DAILY: Assess and document skin integrity     Problem: Musculoskeletal - Adult  Goal: Return mobility to safest level of function  Outcome: Adequate for Discharge  Flowsheets (Taken 3/5/2025 0946)  Return Mobility to Safest Level of Function:   Assess patient stability and activity tolerance for standing, transferring and ambulating with or without assistive devices   Ensure adequate protection for wounds/incisions during mobilization  Goal: Return ADL status to a safe level of function  Outcome: Adequate for Discharge  Flowsheets (Taken 3/5/2025 0946)  Return ADL Status to a Safe Level of Function: Administer medication as ordered     Problem: Anxiety  Goal: Will report anxiety at manageable levels  Description: INTERVENTIONS:  1. Administer medication as ordered  2. Teach and rehearse alternative coping skills  3. Provide emotional support with 1:1 interaction with staff  Outcome: Adequate for Discharge  Flowsheets (Taken 3/5/2025 0946)  Will report anxiety at manageable levels: Administer medication

## 2025-03-05 NOTE — PLAN OF CARE
Problem: Discharge Planning  Goal: Discharge to home or other facility with appropriate resources  Outcome: Progressing     Problem: Pain  Goal: Verbalizes/displays adequate comfort level or baseline comfort level  Outcome: Progressing     Problem: ABCDS Injury Assessment  Goal: Absence of physical injury  Outcome: Progressing     Problem: Skin/Tissue Integrity - Adult  Goal: Incisions, wounds, or drain sites healing without S/S of infection  Outcome: Progressing     Problem: Musculoskeletal - Adult  Goal: Return mobility to safest level of function  Outcome: Progressing  Goal: Return ADL status to a safe level of function  Outcome: Progressing     Problem: Anxiety  Goal: Will report anxiety at manageable levels  Description: INTERVENTIONS:  1. Administer medication as ordered  2. Teach and rehearse alternative coping skills  3. Provide emotional support with 1:1 interaction with staff  Outcome: Progressing     Problem: Behavior  Goal: Pt/Family maintain appropriate behavior and adhere to behavioral management agreement, if implemented  Description: INTERVENTIONS:  1. Assess patient/family's coping skills and  non-compliant behavior (including use of illegal substances)  2. Notify security of behavior or suspected illegal substances which indicate the need for search of the family and/or belongings  3. Encourage verbalization of thoughts and concerns in a socially appropriate manner  4. Utilize positive, consistent limit setting strategies supporting safety of patient, staff and others  5. Encourage participation in the decision making process about the behavioral management agreement  6. If a visitor's behavior poses a threat to safety call refer to organization policy.  7. Initiate consult with , Psychosocial CNS, Spiritual Care as appropriate  Outcome: Progressing

## 2025-03-06 LAB — SURGICAL PATHOLOGY REPORT: NORMAL

## 2025-03-10 ENCOUNTER — OFFICE VISIT (OUTPATIENT)
Dept: SURGERY | Age: 50
End: 2025-03-10
Payer: MEDICARE

## 2025-03-10 ENCOUNTER — APPOINTMENT (OUTPATIENT)
Dept: GENERAL RADIOLOGY | Age: 50
End: 2025-03-10
Payer: MEDICARE

## 2025-03-10 ENCOUNTER — RESULTS FOLLOW-UP (OUTPATIENT)
Dept: OPERATING ROOM | Age: 50
End: 2025-03-10

## 2025-03-10 ENCOUNTER — HOSPITAL ENCOUNTER (EMERGENCY)
Age: 50
Discharge: HOME | End: 2025-03-10
Attending: STUDENT IN AN ORGANIZED HEALTH CARE EDUCATION/TRAINING PROGRAM
Payer: MEDICARE

## 2025-03-10 VITALS
DIASTOLIC BLOOD PRESSURE: 94 MMHG | HEART RATE: 105 BPM | HEIGHT: 61 IN | SYSTOLIC BLOOD PRESSURE: 146 MMHG | WEIGHT: 202 LBS | BODY MASS INDEX: 38.14 KG/M2

## 2025-03-10 VITALS
HEART RATE: 82 BPM | SYSTOLIC BLOOD PRESSURE: 124 MMHG | OXYGEN SATURATION: 96 % | WEIGHT: 180 LBS | RESPIRATION RATE: 16 BRPM | BODY MASS INDEX: 33.99 KG/M2 | TEMPERATURE: 98.1 F | HEIGHT: 61 IN | DIASTOLIC BLOOD PRESSURE: 87 MMHG

## 2025-03-10 DIAGNOSIS — E88.1 LIPODYSTROPHY: Primary | ICD-10-CM

## 2025-03-10 DIAGNOSIS — R55 NEAR SYNCOPE: Primary | ICD-10-CM

## 2025-03-10 LAB
ALBUMIN SERPL-MCNC: 3.8 G/DL (ref 3.5–5.2)
ALBUMIN/GLOB SERPL: 1.2 {RATIO} (ref 1–2.5)
ALP SERPL-CCNC: 53 U/L (ref 35–104)
ALT SERPL-CCNC: 20 U/L (ref 5–33)
ANION GAP SERPL CALCULATED.3IONS-SCNC: 10 MMOL/L (ref 9–17)
AST SERPL-CCNC: 18 U/L
BASOPHILS # BLD: 0 K/UL (ref 0–0.2)
BASOPHILS NFR BLD: 1 % (ref 0–2)
BILIRUB SERPL-MCNC: 0.3 MG/DL (ref 0.3–1.2)
BNP SERPL-MCNC: <36 PG/ML
BUN SERPL-MCNC: 16 MG/DL (ref 6–20)
CALCIUM SERPL-MCNC: 9.1 MG/DL (ref 8.6–10.4)
CHLORIDE SERPL-SCNC: 99 MMOL/L (ref 98–107)
CO2 SERPL-SCNC: 27 MMOL/L (ref 20–31)
CREAT SERPL-MCNC: 0.6 MG/DL (ref 0.5–0.9)
EOSINOPHIL # BLD: 0.1 K/UL (ref 0–0.4)
EOSINOPHILS RELATIVE PERCENT: 1 % (ref 1–4)
ERYTHROCYTE [DISTWIDTH] IN BLOOD BY AUTOMATED COUNT: 14.4 % (ref 12.5–15.4)
GFR, ESTIMATED: >90 ML/MIN/1.73M2
GLUCOSE SERPL-MCNC: 123 MG/DL (ref 70–99)
HCT VFR BLD AUTO: 32.3 % (ref 36–46)
HGB BLD-MCNC: 11.1 G/DL (ref 12–16)
LYMPHOCYTES NFR BLD: 1.8 K/UL (ref 1–4.8)
LYMPHOCYTES RELATIVE PERCENT: 23 % (ref 24–44)
MAGNESIUM SERPL-MCNC: 1.7 MG/DL (ref 1.6–2.6)
MCH RBC QN AUTO: 32.4 PG (ref 26–34)
MCHC RBC AUTO-ENTMCNC: 34.3 G/DL (ref 31–37)
MCV RBC AUTO: 94.5 FL (ref 80–100)
MONOCYTES NFR BLD: 0.5 K/UL (ref 0.1–1.2)
MONOCYTES NFR BLD: 6 % (ref 2–11)
NEUTROPHILS NFR BLD: 69 % (ref 36–66)
NEUTS SEG NFR BLD: 5.4 K/UL (ref 1.8–7.7)
PLATELET # BLD AUTO: 333 K/UL (ref 140–450)
PMV BLD AUTO: 8.2 FL (ref 6–12)
POTASSIUM SERPL-SCNC: 3.8 MMOL/L (ref 3.7–5.3)
PROT SERPL-MCNC: 7 G/DL (ref 6.4–8.3)
RBC # BLD AUTO: 3.41 M/UL (ref 4–5.2)
SODIUM SERPL-SCNC: 136 MMOL/L (ref 135–144)
TROPONIN I SERPL HS-MCNC: <6 NG/L (ref 0–14)
WBC OTHER # BLD: 7.8 K/UL (ref 3.5–11)

## 2025-03-10 PROCEDURE — G8427 DOCREV CUR MEDS BY ELIG CLIN: HCPCS | Performed by: PLASTIC SURGERY

## 2025-03-10 PROCEDURE — 3077F SYST BP >= 140 MM HG: CPT | Performed by: PLASTIC SURGERY

## 2025-03-10 PROCEDURE — 99211 OFF/OP EST MAY X REQ PHY/QHP: CPT | Performed by: PLASTIC SURGERY

## 2025-03-10 PROCEDURE — 84484 ASSAY OF TROPONIN QUANT: CPT

## 2025-03-10 PROCEDURE — G8417 CALC BMI ABV UP PARAM F/U: HCPCS | Performed by: PLASTIC SURGERY

## 2025-03-10 PROCEDURE — 71046 X-RAY EXAM CHEST 2 VIEWS: CPT

## 2025-03-10 PROCEDURE — 83735 ASSAY OF MAGNESIUM: CPT

## 2025-03-10 PROCEDURE — 83880 ASSAY OF NATRIURETIC PEPTIDE: CPT

## 2025-03-10 PROCEDURE — 85025 COMPLETE CBC W/AUTO DIFF WBC: CPT

## 2025-03-10 PROCEDURE — 3080F DIAST BP >= 90 MM HG: CPT | Performed by: PLASTIC SURGERY

## 2025-03-10 PROCEDURE — 93005 ELECTROCARDIOGRAM TRACING: CPT

## 2025-03-10 PROCEDURE — 99285 EMERGENCY DEPT VISIT HI MDM: CPT | Performed by: STUDENT IN AN ORGANIZED HEALTH CARE EDUCATION/TRAINING PROGRAM

## 2025-03-10 PROCEDURE — 80053 COMPREHEN METABOLIC PANEL: CPT

## 2025-03-10 ASSESSMENT — LIFESTYLE VARIABLES
HOW OFTEN DO YOU HAVE A DRINK CONTAINING ALCOHOL: NEVER
HOW MANY STANDARD DRINKS CONTAINING ALCOHOL DO YOU HAVE ON A TYPICAL DAY: PATIENT DOES NOT DRINK

## 2025-03-10 ASSESSMENT — PAIN - FUNCTIONAL ASSESSMENT: PAIN_FUNCTIONAL_ASSESSMENT: 0-10

## 2025-03-10 ASSESSMENT — ENCOUNTER SYMPTOMS
VOMITING: 0
TROUBLE SWALLOWING: 0
SHORTNESS OF BREATH: 0
ABDOMINAL PAIN: 0
FACIAL SWELLING: 0
CHEST TIGHTNESS: 0

## 2025-03-10 ASSESSMENT — PAIN DESCRIPTION - LOCATION: LOCATION: BACK

## 2025-03-10 ASSESSMENT — PAIN SCALES - GENERAL: PAINLEVEL_OUTOF10: 4

## 2025-03-10 NOTE — PROGRESS NOTES
Patient came in today for a nurse visit due to her wound vac kept alarming with movement. Wound vac was assessed and the tubing was taped down causing a break in suction. Tubing was reinforced across the wound vac. Patient sat in the chart making sure the wound vac would not alarm and it did not alarm again. Patient's right MARSHA drain was milked and a new dressing was started to be put on when patient stated she felt like she was going to vomit and was dizzy. She was assisted to a seat position. Patient was noted to diaphoretic. She was given water as requested and after a few minutes stated she felt better. She stated the last time she ate was in the early hours of the morning, reporting it was bread and zero sugar cranberry juice. She stated she took a Tylenol #3 for the pain then a Tylenol Arthritis for her back pain around 6:30 am. She stated since her surgery she is having leg swelling, occasional numbness in her feet, dizziness and nausea. Patient also drove herself to the office. Her calves were checked and no warmth was noted.    Writer reached out to Dr. Choudhary. Dr. Choudhary would like to know where the Tylenol #3 came from and the patient needs to stop taking that. She also stated patient may stop the baclofen and neurontin if they are not benefiting the patient. She is ordering the patient US of the [facility] and recommends the patient goes to the ER.     Patient notified of the above. Patient states she thought the Norco was Tylenol #3 and is only taking what Dr. Choudhary gave her. Writer walked the patient down to the ER due to her not knowing how to get down there.

## 2025-03-10 NOTE — DISCHARGE INSTRUCTIONS
FOLLOW-UP  - Follow up with your your surgeon regarding wound vac and incision healing as scheduled.     MEDICATIONS  - Continue taking home medications as directed.    ADDITIONAL INSTRUCTIONS  - Remember to eat a well balanced meal with medications. Visit your primary care provider to go over medication lists and side effects.   - Please return immediately to the [facility] Emergency Department if you have loss of consciousness, chest pain, shortness of breath or any concerning symptoms.

## 2025-03-10 NOTE — ED PROVIDER NOTES
[facility] Emergency Department      Pt Name: Lara Voss  MRN: [id number]  Birthdate 1975  Date of evaluation: 3/10/2025    EMERGENCY DEPARTMENT ENCOUNTER      PERTINENT ATTENDING PHYSICIAN COMMENTS:      Attestation  I performed a history and physical examination of the patient/ or directly observed  and discussed management with the resident. I reviewed the resident’s note and agree with the documented findings and plan of care. Any areas of disagreement are noted on the chart. I was personally present for the key portions of any procedures. I have documented in the chart those procedures where I was not present during the key portions. I have reviewed the emergency nurses triage note. I agree with the chief complaint, past medical history, past surgical history, allergies, medications, social and family history as documented unless otherwise noted below.    For Residents/Physician Assistant/ Nurse Practitioner cases/documentation I have personally evaluated this patient and have completed at least one if not all key elements of the E/M (history, physical exam, and MDM). Additional findings are as noted.    CHIEF COMPLAINT       Chief Complaint   Patient presents with    Dizziness     Patient presents to ED with complaints of near syncope episode. Patient has complaints of leg swelling and tingling and in her legs and arms. Patient states she has had nausea and vomiting. Patient had 1.5 labs of fat tissue removed from right and left sides and part of her back, this was on March 4th, 2025. Patient currently has a drain and wound vac in. Patient also states she has a gluten and sugar intolerance and her diet has not been well since surgery. Patient also states her tongue feels numb.        HISTORY OF PRESENT ILLNESS    Lara Voss is a 49 y.o. female who presents due to near syncopal episode. She had surgery on the 4th, liposuction. She was at a follow up appointment today when she had her

## 2025-03-10 NOTE — ED NOTES
[facility] Emergency Department  [address]  [facility] [address]  Phone: [phone]  Fax: [phone]    EMERGENCY DEPARTMENT ENCOUNTER          Pt Name: Lara Voss  MRN: [id number]  Birthdate 1975  Date of evaluation: 3/10/2025      CHIEF COMPLAINT       Chief Complaint   Patient presents with    Dizziness     Patient presents to ED with complaints of near syncope episode. Patient has complaints of leg swelling and tingling and in her legs and arms. Patient states she has had nausea and vomiting. Patient had 1.5 labs of fat tissue removed from right and left sides and part of her back, this was on March 4th, 2025. Patient currently has a drain and wound vac in. Patient also states she has a gluten and sugar intolerance and her diet has not been well since surgery. Patient also states her tongue feels numb.        HISTORY OF PRESENT ILLNESS       Lara Voss is a 49 y.o. female with pmhx of HTN and food sensitivities to gluten, sugar, red meats. Patient presents with near syncope at post op appointment today. She felt really hot and sweaty and felt like she was going to \"pass out\".  Patient also complains of bilateral leg swelling and tingling in her arms and legs which began on Thursday, after she had some smoked turkey.  She also noted to have tingling sensation on her tongue. On March 4 patient had surgery done to remove fat tissue on her back on bilateral sides. She states she has a wound VAC across her lower back. Pt;s surgeon told her to stop baclofen and gabapentin. Pt has no known history of heart or kidney issues. Pt states that she believes foods that are smoked will cause her tongue to be tingling and legs to swell.  She denies chest pain, loss of consciousness, palpitations, weakness, abdominal pain, trouble speaking.     REVIEW OF SYSTEMS     Review of Systems   Constitutional:  Positive for diaphoresis. Negative for chills, fatigue and fever.   HENT:

## 2025-03-12 ENCOUNTER — OFFICE VISIT (OUTPATIENT)
Dept: SURGERY | Age: 50
End: 2025-03-12

## 2025-03-12 VITALS
BODY MASS INDEX: 36.44 KG/M2 | WEIGHT: 193 LBS | DIASTOLIC BLOOD PRESSURE: 89 MMHG | HEIGHT: 61 IN | SYSTOLIC BLOOD PRESSURE: 146 MMHG

## 2025-03-12 DIAGNOSIS — Z98.890 POSTOPERATIVE STATE: Primary | ICD-10-CM

## 2025-03-12 LAB
EKG ATRIAL RATE: 75 BPM
EKG P AXIS: 49 DEGREES
EKG P-R INTERVAL: 146 MS
EKG Q-T INTERVAL: 402 MS
EKG QRS DURATION: 96 MS
EKG QTC CALCULATION (BAZETT): 448 MS
EKG T AXIS: 14 DEGREES
EKG VENTRICULAR RATE: 75 BPM

## 2025-03-12 PROCEDURE — 99024 POSTOP FOLLOW-UP VISIT: CPT | Performed by: PLASTIC SURGERY

## 2025-03-12 PROCEDURE — 93010 ELECTROCARDIOGRAM REPORT: CPT | Performed by: INTERNAL MEDICINE

## 2025-03-12 NOTE — PROGRESS NOTES
[facility]  [address]  [facility] [address]       OFFICE POST-OP NOTE    Patient Name:  Lara Voss    :  1975    MRN:  [id number]  STATUS POST  Chief Complaint   Patient presents with    Post-Op Check     Post op- liposuction bilateral flank       SUBJECTIVE  Patient seen and examined.  Patient status post liposuction of bilateral flanks and 1000 cc of tumescent solution was injected on the right and 1000 cc of tumescent solution was injected on the left, and 1000 cc of lipo aspirate was performed on the right and 1000 cc of lipo aspirate was removed from the left.  Patient also had a scar revision measuring 52 cm x 9 cm with Prevena wound VAC placement.  Her surgery was performed on 3/4/2020.      The pathology was discussed with the patient.  The pathology was consistent with a scar.  A copy was provided to the patient.    Interim history patient states that she has humming in her wound VAC.  Patient states that she wears a waist .  Patient was advised not to wear the waist .  We will plan to remove the wound VAC.  Patient also went to the emergency room for syncopal episode patient was subsequently discharged.      PHYSICAL EXAM  Vital Signs:  BP (!) 146/89 (BP Site: Right Upper Arm, Patient Position: Sitting, BP Cuff Size: Large Adult)   Ht 1.549 m (5' 1\")   Wt 87.5 kg (193 lb)   LMP 2021   BMI 36.47 kg/m²     Incisions:  Suture line clean dry and intact.  Patient has good results from the liposuction.  Skin:  No evidence of infection.   Neurologic:  Alert & oriented x 3.    Pathology:    SURGICAL PATHOLOGY REPORT  Order: [id number]   Status: Final result             Component  Ref Range & Units (hover) 3/4/25 1419   Surgical Pathology Report Path Number: [id number]    -- Diagnosis --  SCAR, FLANK AND BACK, REVISION:  -SKIN WITH SCAR-LIKE CHANGES.        Abigail Burrows  **Electronically

## 2025-03-13 NOTE — ED PROVIDER NOTES
[facility] Emergency Department  [address]  [facility] [address]  Phone: [phone]  Fax: [phone]    EMERGENCY DEPARTMENT ENCOUNTER          Pt Name: Lara Voss  MRN: [id number]  Birthdate 1975  Date of evaluation: 3/10/2025      CHIEF COMPLAINT       Chief Complaint   Patient presents with    Dizziness     Patient presents to ED with complaints of near syncope episode. Patient has complaints of leg swelling and tingling and in her legs and arms. Patient states she has had nausea and vomiting. Patient had 1.5 labs of fat tissue removed from right and left sides and part of her back, this was on March 4th, 2025. Patient currently has a drain and wound vac in. Patient also states she has a gluten and sugar intolerance and her diet has not been well since surgery. Patient also states her tongue feels numb.        HISTORY OF PRESENT ILLNESS       Lara Voss is a 49 y.o. female with pmhx of HTN and food sensitivities to gluten, sugar, red meats. Patient presents with near syncope at post op appointment today. She felt really hot and sweaty and felt like she was going to \"pass out\".  Patient also complains of bilateral leg swelling and tingling in her arms and legs which began on Thursday, after she had some smoked turkey.  She also noted to have tingling sensation on her tongue. On March 4 patient had surgery done to remove fat tissue on her back on bilateral sides. She states she has a wound VAC across her lower back. Pt;s surgeon told her to stop baclofen and gabapentin. Pt has no known history of heart or kidney issues. Pt states that she believes foods that are smoked will cause her tongue to be tingling and legs to swell.  She denies chest pain, loss of consciousness, palpitations, weakness, abdominal pain, trouble speaking.     REVIEW OF SYSTEMS     Review of Systems   Constitutional:  Positive for diaphoresis. Negative for chills, fatigue and fever.   HENT:

## 2025-03-19 ENCOUNTER — OFFICE VISIT (OUTPATIENT)
Dept: SURGERY | Age: 50
End: 2025-03-19

## 2025-03-19 VITALS
HEART RATE: 109 BPM | BODY MASS INDEX: 35.12 KG/M2 | DIASTOLIC BLOOD PRESSURE: 93 MMHG | SYSTOLIC BLOOD PRESSURE: 149 MMHG | WEIGHT: 186 LBS | HEIGHT: 61 IN

## 2025-03-19 DIAGNOSIS — Z98.890 POSTOPERATIVE STATE: Primary | ICD-10-CM

## 2025-03-19 PROCEDURE — 99024 POSTOP FOLLOW-UP VISIT: CPT | Performed by: PLASTIC SURGERY

## 2025-03-19 NOTE — PROGRESS NOTES
SCAR  REVISION BILATERAL FLANKS AND BACK; SCAR REVISION/EXCISION          Source of Specimen  A: FLANKS AND BACK SCAR      Gross Description  \"RIAN LIN FLANKS AND BACK SCAR\" Received in formalin is a 35.5  x 11.0 x 2.5 cm portion of wrinkled brown-tan skin with striae and  subcutaneous tissue.  Sectioning reveals fibrofatty cut surfaces with  no masses or necrosis.  Representative in 1 CS.      Makayla Rayo/mj:3/5/2025  Microscopic Description  Microscopic examination performed.      Processing Lab:  [address] [address]  Interpretation Performed at [address] [address]    SURGICAL PATHOLOGY CONSULTATION       Patient Name: RIAN LIN  [facility] Rec: [id number]  [facility]  [facility]  ANATOMIC PATHOLOGY  [facility].  [place] [address] [phone]  Fax: [phone]        ASSESSMENT   Diagnosis Orders   1. Postoperative state            PLAN  1.  Continue abdominal binder, do not wear the waist  at this time that he may rub on the incisions and can cause an open wound  The drain is putting out 15 cc/day.  We will remove the drain.  We will remove all sutures.  Will put Aquacel Ag.    Plan discussed with patient.    Electronically signed by:  JIM NIELSEN M.D.   3/19/2025

## 2025-08-31 ENCOUNTER — APPOINTMENT (OUTPATIENT)
Dept: GENERAL RADIOLOGY | Age: 50
End: 2025-08-31
Payer: MEDICARE

## 2025-08-31 ENCOUNTER — HOSPITAL ENCOUNTER (EMERGENCY)
Age: 50
Discharge: HOME | End: 2025-08-31
Attending: EMERGENCY MEDICINE
Payer: MEDICARE

## 2025-08-31 VITALS
WEIGHT: 165 LBS | DIASTOLIC BLOOD PRESSURE: 80 MMHG | OXYGEN SATURATION: 96 % | HEIGHT: 61 IN | TEMPERATURE: 98.6 F | RESPIRATION RATE: 18 BRPM | HEART RATE: 74 BPM | SYSTOLIC BLOOD PRESSURE: 131 MMHG | BODY MASS INDEX: 31.15 KG/M2

## 2025-08-31 DIAGNOSIS — S82.891A RIGHT MALLEOLAR FRACTURE, CLOSED, INITIAL ENCOUNTER: ICD-10-CM

## 2025-08-31 DIAGNOSIS — S96.911A RIGHT ANKLE STRAIN, INITIAL ENCOUNTER: Primary | ICD-10-CM

## 2025-08-31 PROCEDURE — 73610 X-RAY EXAM OF ANKLE: CPT

## 2025-08-31 PROCEDURE — 99283 EMERGENCY DEPT VISIT LOW MDM: CPT

## 2025-08-31 PROCEDURE — 6370000000 HC RX 637 (ALT 250 FOR IP): Performed by: EMERGENCY MEDICINE

## 2025-08-31 PROCEDURE — 73562 X-RAY EXAM OF KNEE 3: CPT

## 2025-08-31 RX ORDER — ACETAMINOPHEN 500 MG
1000 TABLET ORAL ONCE
Status: COMPLETED | OUTPATIENT
Start: 2025-08-31 | End: 2025-08-31

## 2025-08-31 RX ORDER — IBUPROFEN 800 MG/1
800 TABLET, FILM COATED ORAL ONCE
Status: COMPLETED | OUTPATIENT
Start: 2025-08-31 | End: 2025-08-31

## 2025-08-31 RX ADMIN — IBUPROFEN 800 MG: 800 TABLET ORAL at 01:36

## 2025-08-31 RX ADMIN — ACETAMINOPHEN 1000 MG: 500 TABLET ORAL at 01:36

## 2025-08-31 ASSESSMENT — PAIN DESCRIPTION - LOCATION: LOCATION: ANKLE;KNEE

## 2025-08-31 ASSESSMENT — PAIN SCALES - GENERAL
PAINLEVEL_OUTOF10: 10
PAINLEVEL_OUTOF10: 10

## 2025-08-31 ASSESSMENT — PAIN - FUNCTIONAL ASSESSMENT
PAIN_FUNCTIONAL_ASSESSMENT: 0-10
PAIN_FUNCTIONAL_ASSESSMENT: 0-10

## 2025-08-31 ASSESSMENT — PAIN DESCRIPTION - ORIENTATION: ORIENTATION: RIGHT;LEFT

## 2025-08-31 ASSESSMENT — PAIN DESCRIPTION - DESCRIPTORS: DESCRIPTORS: SHARP;THROBBING

## (undated) DEVICE — SUTURE MONOCRYL STRATAFIX SPRL + 3 0 SGL ARMED PS 1 24 IN LEN SXMP1B103

## (undated) DEVICE — 1010 S-DRAPE TOWEL DRAPE 10/BX: Brand: STERI-DRAPE™

## (undated) DEVICE — APPLICATOR MEDICATED 26 CC SOLUTION HI LT ORNG CHLORAPREP

## (undated) DEVICE — SUTURE VICRYL + SZ 2-0 L27IN ABSRB WHT SH 1/2 CIR TAPERCUT VCP417H

## (undated) DEVICE — SOLUTION IV IRRIG POUR BRL 0.9% SODIUM CHL 2F7124

## (undated) DEVICE — STRIP,CLOSURE,WOUND,MEDI-STRIP,1/2X4: Brand: MEDLINE

## (undated) DEVICE — TUBING SMK EVAC W7 8INXL6FT FOR SE01 SMK SHARK

## (undated) DEVICE — MARKER,SKIN,WI/RULER AND LABELS: Brand: MEDLINE

## (undated) DEVICE — RESERVOIR,SUCTION,100CC,SILICONE: Brand: MEDLINE

## (undated) DEVICE — BINDER ABD UNISX 3 PNL E PREM CNTCT CLSR L XL 46INX62INX9IN

## (undated) DEVICE — SUTURE PROL SZ 4-0 L18IN NONABSORBABLE BLU L16MM PC-3 3/8 8634G

## (undated) DEVICE — SUTURE PROL SZ 2-0 L30IN NONABSORBABLE BLU L26MM SH 1/2 CIR 8833H

## (undated) DEVICE — TOWEL,OR,DSP,ST,BLUE,DLX,XR,4/PK,20PK/CS: Brand: MEDLINE

## (undated) DEVICE — BINDER ABD UNISX 9IN 62IN L AND XL UNIV

## (undated) DEVICE — DRESSING GERM 6-12FR DIA1IN CNTR H 4MM ANTIMIC PROTCT DISK

## (undated) DEVICE — CORD,CAUTERY,BIPOLAR,STERILE: Brand: MEDLINE

## (undated) DEVICE — BINDER ABD M/L H12IN FOR 46-62IN WHT 4 SLD PNL DSGN HOOP

## (undated) DEVICE — SUTURE PDS II SZ 2-0 L27IN ABSRB VLT L36MM CT-1 1/2 CIR Z339H

## (undated) DEVICE — MHPB GEN MINOR PACK: Brand: MEDLINE INDUSTRIES, INC.

## (undated) DEVICE — 3M™ STERI-DRAPE™ INCISE DRAPE 1050 (60CM X 45CM): Brand: STERI-DRAPE™

## (undated) DEVICE — 2DSM24 2-0 UND MONODERM 30X30: Brand: 2DSM24 2-0 UND MONODERM 30X30

## (undated) DEVICE — BANDAGE COBAN 4 IN COMPR W4INXL5YD FOAM COHESIVE QUIK STK SELF ADH SFT

## (undated) DEVICE — SUTURE VCRL + SZ 2-0 L27IN ABSRB CLR CT-1 1/2 CIR TAPERCUT VCP259H

## (undated) DEVICE — BLADE ES ELASTOMERIC COAT INSUL DURABLE BEND UPTO 90DEG

## (undated) DEVICE — SUTURE VCRL SZ 3-0 L27IN ABSRB UD L26MM SH 1/2 CIR J416H

## (undated) DEVICE — INTENDED FOR TISSUE SEPARATION, AND OTHER PROCEDURES THAT REQUIRE A SHARP SURGICAL BLADE TO PUNCTURE OR CUT.: Brand: BARD-PARKER ® CARBON RIB-BACK BLADES

## (undated) DEVICE — COVER,LIGHT HANDLE,FLX,2/PK: Brand: MEDLINE INDUSTRIES, INC.

## (undated) DEVICE — SUTURE PROL SZ 2-0 L18IN NONABSORBABLE BLU FS L26MM 3/8 CIR 8685H

## (undated) DEVICE — NEEDLE HYPO 25GA L1.5IN BLU POLYPR HUB S STL REG BVL STR

## (undated) DEVICE — Device

## (undated) DEVICE — SUTURE 2-0 STRATAFIX MONOCRYL 45CM CT-1

## (undated) DEVICE — COVER LT HNDL BLU PLAS

## (undated) DEVICE — SYSTEM WND THER 14 DAY 150 CC CANSTR THER UNIT PREVENA + 125

## (undated) DEVICE — E-Z CLEAN, NON-STICK, PTFE COATED, ELECTROSURGICAL BLADE ELECTRODE, 2.5 INCH (6.35 CM): Brand: EZ CLEAN

## (undated) DEVICE — SOLUTION IV 1000ML LAC RINGERS PH 6.5 INJ USP VIAFLX PLAS

## (undated) DEVICE — GLOVE SURG SZ 6 L12IN FNGR THK79MIL GRN LTX FREE

## (undated) DEVICE — BANDAGE COMPR W6INXL12FT SMOOTH FOR LIMB EXSANG ESMARCH

## (undated) DEVICE — 3M™ IOBAN™ 2 ANTIMICROBIAL INCISE DRAPE 6650EZ: Brand: IOBAN™ 2

## (undated) DEVICE — SUTURE VICRYL + SZ 3-0 L27IN ABSRB UD L26MM SH 1/2 CIR VCP416H

## (undated) DEVICE — STERILE POLYISOPRENE POWDER-FREE SURGICAL GLOVES WITH EMOLLIENT COATING: Brand: PROTEXIS

## (undated) DEVICE — STRATAFIX SPRL PDS + 2-0 23CM CT-2

## (undated) DEVICE — STAZ MINOR LAPAROTOMY: Brand: MEDLINE INDUSTRIES, INC.

## (undated) DEVICE — TOWEL,OR,DSP,ST,BLUE,STD,4/PK,20PK/CS: Brand: MEDLINE

## (undated) DEVICE — GAUZE,SPONGE,FLUFF,6"X6.75",STRL,5/TRAY: Brand: MEDLINE

## (undated) DEVICE — TOTAL TRAY, DB, 100% SILI FOLEY, 16FR 10: Brand: MEDLINE

## (undated) DEVICE — PAD,ABDOMINAL,5"X9",ST,LF,25/BX: Brand: MEDLINE INDUSTRIES, INC.

## (undated) DEVICE — GARMENT,MEDLINE,DVT,INT,CALF,MED, GEN2: Brand: MEDLINE

## (undated) DEVICE — AEGIS 1" DISK 4MM HOLE, PEEL OPEN: Brand: MEDLINE

## (undated) DEVICE — SET TUBING LIPOSURG

## (undated) DEVICE — SURGICAL PROCEDURE KIT BASIN MAJ SET UP

## (undated) DEVICE — SUTURE VICRYL + SZ 2-0 L36IN ABSRB UD L36MM CT-1 1/2 CIR VCP945H

## (undated) DEVICE — SUTURE VCRL SZ 2-0 L36IN ABSRB UD L36MM CT-1 1/2 CIR J945H

## (undated) DEVICE — 3M™ PRECISE™ VISTA DISPOSABLE SKIN STAPLER 3995: Brand: 3M™ PRECISE™

## (undated) DEVICE — TOTAL TRAY, 16FR 10ML SIL FOLEY, URN: Brand: MEDLINE

## (undated) DEVICE — INTENDED FOR TISSUE SEPARATION, AND OTHER PROCEDURES THAT REQUIRE A SHARP SURGICAL BLADE TO PUNCTURE OR CUT.: Brand: BARD-PARKER ® STAINLESS STEEL BLADES

## (undated) DEVICE — SOLUTION IRRIG 500ML 0.9% SOD CHLO USP POUR PLAS BTL

## (undated) DEVICE — DRAIN SURG 19FR 100% SIL RADPQ RND CHN FULL FLUT

## (undated) DEVICE — MASTISOL ADHESIVE LIQ 2/3ML

## (undated) DEVICE — 3M™ STERI-DRAPE™  POUCH WITH IOBAN™ 2 INCISE FILM 6657: Brand: STERI-DRAPE™ IOBAN™ 2

## (undated) DEVICE — AGENT HEMSTAT 3GM OXIDIZED REGENERATED CELOS ABSRB FOR CONT (ORDER MULTIPLES OF 5EA)

## (undated) DEVICE — DRESSING NEG PRESSURE INCISION MGMT SYS 90 CM KIT PREVENA +

## (undated) DEVICE — SUTURE STRATAFIX SPRL MCRYL + 3 0 SGL ARMED PS 1 24 IN LEN SXMP1B103

## (undated) DEVICE — PENCIL ES L3M BTTN SWCH HOLSTER W/ BLDE ELECTRD EDGE

## (undated) DEVICE — 2DE14 3-0 UNDYD MONODERM 30X30: Brand: 2DE14 3-0 UNDYD MONODERM 30X30

## (undated) DEVICE — 3M™ WARMING BLANKET, UPPER BODY, 10 PER CASE, 42268: Brand: BAIR HUGGER™

## (undated) DEVICE — TUBING ASPIRATION FLEX 12FT

## (undated) DEVICE — PREP-RESISTANT MARKER W/ RULER: Brand: MEDLINE INDUSTRIES, INC.

## (undated) DEVICE — GLOVE SURG SZ 6 L12IN THK75MIL DK GRN LTX FREE

## (undated) DEVICE — 4-PORT MANIFOLD: Brand: NEPTUNE 2

## (undated) DEVICE — SUTURE PROL SZ 3-0 L18IN NONABSORBABLE BLU L30MM PS-1 3/8 8663G

## (undated) DEVICE — STRIP SKIN CLSR W1XL5IN NYL REINF CURAD

## (undated) DEVICE — SHEET, ORTHO, SPLIT, STERILE: Brand: MEDLINE

## (undated) DEVICE — DECANTER BAG 9": Brand: MEDLINE INDUSTRIES, INC.

## (undated) DEVICE — TUBING, SUCTION, 1/4" X 12', STRAIGHT: Brand: MEDLINE

## (undated) DEVICE — TUBING SET SUCTION LIPO SYS PVC

## (undated) DEVICE — TOWEL,OR,DSP,ST,BLUE,STD,6/PK,12PK/CS: Brand: MEDLINE

## (undated) DEVICE — SUTURE PROL SZ 5-0 L18IN NONABSORBABLE BLU L13MM PC-1 3/8 8618G

## (undated) DEVICE — SOLUTION IRRIG 1000ML 0.9% SOD CHL USP POUR PLAS BTL

## (undated) DEVICE — DRESSING,GAUZE,XEROFORM,CURAD,5"X9",ST: Brand: CURAD

## (undated) DEVICE — SUTURE ETHILON SZ 5-0 L18IN NONABSORBABLE BLK P-3 L13MM 3/8 698G

## (undated) DEVICE — STRAP,POSITIONING,KNEE/BODY,FOAM,4X60": Brand: MEDLINE

## (undated) DEVICE — DRAPE,REIN 53X77,STERILE: Brand: MEDLINE

## (undated) DEVICE — 3M™ TEGADERM™ I.V. ADVANCED SECUREMENT DRESSING, 1683, 2-1/2 IN X 2-3/4 IN, 6.5 CM X 7 CM, 100/CT 4CT/CASE: Brand: 3M™ TEGADERM™

## (undated) DEVICE — SUTURE PDS II SZ 0 L60IN ABSRB VLT L65MM TP-1 1/2 CIR Z991G

## (undated) DEVICE — DRAIN SURG 19FR L025IN DIA63MM SIL CHN RND FULL FLUT CLS

## (undated) DEVICE — SPONGE LAP W18XL18IN WHT COT 4 PLY FLD STRUNG RADPQ DISP ST 2 PER PACK

## (undated) DEVICE — SUTURE MONOCRYL STRATAFIX SPRL + SZ 2-0 L18IN ABSRB UD CT-1 SXMP1B413

## (undated) DEVICE — FILTER BACTERIA LIPOSALES

## (undated) DEVICE — SHEET, T, LAPAROTOMY, STERILE: Brand: MEDLINE

## (undated) DEVICE — DRESSING TRNSPAR W4XL4.8IN FLM SURESITE 123

## (undated) DEVICE — BLADE,CARBON-STEEL,15,STRL,DISPOSABLE,TB: Brand: MEDLINE

## (undated) DEVICE — ELECTRODE PT RET AD L9FT HI MOIST COND ADH HYDRGEL CORDED

## (undated) DEVICE — STAZ MAJOR PLASTIC: Brand: MEDLINE INDUSTRIES, INC.

## (undated) DEVICE — TUBING SET INFLTR LIPO SYS PVC

## (undated) DEVICE — BLANKET WRM W29.9XL79.1IN UP BODY FORC AIR MISTRAL-AIR